# Patient Record
Sex: FEMALE | Race: OTHER | Employment: UNEMPLOYED | URBAN - METROPOLITAN AREA
[De-identification: names, ages, dates, MRNs, and addresses within clinical notes are randomized per-mention and may not be internally consistent; named-entity substitution may affect disease eponyms.]

---

## 2017-01-23 ENCOUNTER — TELEPHONE (OUTPATIENT)
Dept: FAMILY MEDICINE CLINIC | Facility: CLINIC | Age: 47
End: 2017-01-23

## 2017-01-23 DIAGNOSIS — R92.1 BREAST CALCIFICATION, LEFT: Primary | ICD-10-CM

## 2017-01-23 NOTE — TELEPHONE ENCOUNTER
Cate Leaks from imaging requesting diagnostic mammo order for f/u from last mammo, pt scheduled on Wednesday.

## 2017-01-25 ENCOUNTER — HOSPITAL ENCOUNTER (OUTPATIENT)
Dept: MAMMOGRAPHY | Facility: HOSPITAL | Age: 47
Discharge: HOME | End: 2017-01-25
Attending: FAMILY MEDICINE
Payer: COMMERCIAL

## 2017-01-25 DIAGNOSIS — R92.1 BREAST CALCIFICATION SEEN ON MAMMOGRAM: ICD-10-CM

## 2017-01-25 DIAGNOSIS — R92.1 BREAST CALCIFICATION, LEFT: ICD-10-CM

## 2017-01-25 PROCEDURE — 77065 DX MAMMO INCL CAD UNI: CPT | Performed by: RADIOLOGY

## 2017-01-29 NOTE — PROGRESS NOTES
Quick Note:    Follow up imaging shows stable calcifications and radiologist recommends follow imaging in 6 months. Please order. SIX MONTH FOLLOW-UP DIAGNOSTIC MAMMOGRAM: BILATERAL BREASTS.  Dx breast calcifications or abnormal mammogram  ______

## 2017-02-08 ENCOUNTER — TELEPHONE (OUTPATIENT)
Dept: FAMILY MEDICINE CLINIC | Facility: CLINIC | Age: 47
End: 2017-02-08

## 2017-02-08 DIAGNOSIS — R92.1 BREAST CALCIFICATIONS: Primary | ICD-10-CM

## 2017-02-08 NOTE — TELEPHONE ENCOUNTER
----- Message from Jodi Orozco MD sent at 1/29/2017  5:18 PM CST -----  Follow up imaging shows stable calcifications and radiologist recommends follow imaging in 6 months. Please order. SIX MONTH FOLLOW-UP DIAGNOSTIC MAMMOGRAM: BILATERAL BREASTS.   Dx

## 2017-02-17 NOTE — TELEPHONE ENCOUNTER
No return call from pt. Therefore results with MD note mailed to pt along with future 6 month f/u diagnostic mammo order.

## 2017-05-23 ENCOUNTER — APPOINTMENT (OUTPATIENT)
Dept: OCCUPATIONAL MEDICINE | Age: 47
End: 2017-05-23
Attending: EMERGENCY MEDICINE

## 2017-06-23 ENCOUNTER — APPOINTMENT (OUTPATIENT)
Dept: OTHER | Facility: HOSPITAL | Age: 47
End: 2017-06-23
Attending: EMERGENCY MEDICINE

## 2017-06-24 ENCOUNTER — APPOINTMENT (OUTPATIENT)
Dept: GENERAL RADIOLOGY | Facility: HOSPITAL | Age: 47
End: 2017-06-24
Attending: EMERGENCY MEDICINE
Payer: OTHER MISCELLANEOUS

## 2017-06-24 ENCOUNTER — HOSPITAL ENCOUNTER (EMERGENCY)
Facility: HOSPITAL | Age: 47
Discharge: HOME | End: 2017-06-24
Attending: EMERGENCY MEDICINE
Payer: OTHER MISCELLANEOUS

## 2017-06-24 VITALS
BODY MASS INDEX: 28.56 KG/M2 | SYSTOLIC BLOOD PRESSURE: 128 MMHG | WEIGHT: 182 LBS | HEART RATE: 60 BPM | TEMPERATURE: 98 F | RESPIRATION RATE: 18 BRPM | OXYGEN SATURATION: 99 % | DIASTOLIC BLOOD PRESSURE: 61 MMHG | HEIGHT: 67 IN

## 2017-06-24 DIAGNOSIS — S80.02XA CONTUSION OF LEFT KNEE, INITIAL ENCOUNTER: Primary | ICD-10-CM

## 2017-06-24 PROCEDURE — 99283 EMERGENCY DEPT VISIT LOW MDM: CPT

## 2017-06-24 PROCEDURE — 73560 X-RAY EXAM OF KNEE 1 OR 2: CPT | Performed by: EMERGENCY MEDICINE

## 2017-06-24 RX ORDER — IBUPROFEN 600 MG/1
600 TABLET ORAL ONCE
Status: COMPLETED | OUTPATIENT
Start: 2017-06-24 | End: 2017-06-24

## 2017-06-24 NOTE — ED INITIAL ASSESSMENT (HPI)
Pt states she was at work and 10 table tops fell on her back and hit the left side of her leg, c/o back pain and left knee pain.

## 2017-06-24 NOTE — ED NOTES
Pt resting on cart, per pt, she was stacking linen at work, 2 bundles of linen fell on pt, pushing her forward, pt fell on left knee, c/o left knee pain, which is now beginning to radiate up toward left thigh,   Toes, warm and mobile, cap refill is less th

## 2017-06-24 NOTE — ED PROVIDER NOTES
Patient Seen in: [facility] Emergency Department    History   Patient presents with:  Musculoskeletal Problem    Stated Complaint: left leg and back pain. The history is provided by the patient. The history is limited by a language barrier.  A and negative except as noted above. PSFH elements reviewed from today and agreed except as otherwise stated in HPI.     Physical Exam   ED Triage Vitals [06/24/17 0803]  BP: 158/68  Pulse: 64  Resp: 18  Temp: 97.8 °F (36.6 °C)  Temp src: Temporal  SpO2: to display    ============================================================  ED Course  ------------------------------------------------------------  MDM   Differential diagnosis includes fracture, dislocation, contusion, musculoskeletal strain, ligament in

## 2017-06-27 ENCOUNTER — OFFICE VISIT (OUTPATIENT)
Dept: FAMILY MEDICINE CLINIC | Facility: CLINIC | Age: 47
End: 2017-06-27

## 2017-06-27 VITALS
WEIGHT: 180.19 LBS | HEART RATE: 52 BPM | HEIGHT: 67 IN | BODY MASS INDEX: 28.28 KG/M2 | DIASTOLIC BLOOD PRESSURE: 65 MMHG | SYSTOLIC BLOOD PRESSURE: 115 MMHG

## 2017-06-27 DIAGNOSIS — L98.9 SKIN LESION: ICD-10-CM

## 2017-06-27 DIAGNOSIS — Z00.00 ROUTINE MEDICAL EXAM: Primary | ICD-10-CM

## 2017-06-27 PROCEDURE — 99396 PREV VISIT EST AGE 40-64: CPT | Performed by: FAMILY MEDICINE

## 2017-06-27 RX ORDER — MULTIVIT-MIN/IRON/FOLIC ACID/K 18-600-40
1 CAPSULE ORAL DAILY
COMMUNITY
End: 2017-06-27

## 2017-06-27 NOTE — PROGRESS NOTES
HPI:   Bernadette Chin is a 40year old female who presents for a complete physical exam.    Recent injury at work - dealing with workman's comp for It. Was doing well until the accident at work.  Concerned about lesion on her face and on left breast - feels lik lesion, numerous pigmented lesions on abdomen and upper back.    HEENT: atraumatic, normocephalic,ears and throat are clear  EYES:PERRLA, EOMI, normal optic disk,conjunctiva are clear  NECK: supple,no adenopathy,no bruits  CHEST: no chest tenderness  BREAST

## 2017-06-28 ENCOUNTER — LAB ENCOUNTER (OUTPATIENT)
Dept: LAB | Age: 47
End: 2017-06-28
Attending: FAMILY MEDICINE
Payer: COMMERCIAL

## 2017-06-28 DIAGNOSIS — Z00.00 ROUTINE MEDICAL EXAM: ICD-10-CM

## 2017-06-28 PROCEDURE — 36415 COLL VENOUS BLD VENIPUNCTURE: CPT

## 2017-06-28 PROCEDURE — 84443 ASSAY THYROID STIM HORMONE: CPT

## 2017-06-28 PROCEDURE — 80053 COMPREHEN METABOLIC PANEL: CPT

## 2017-06-28 PROCEDURE — 82306 VITAMIN D 25 HYDROXY: CPT

## 2017-06-28 PROCEDURE — 80061 LIPID PANEL: CPT

## 2017-06-28 PROCEDURE — 85025 COMPLETE CBC W/AUTO DIFF WBC: CPT

## 2017-07-05 ENCOUNTER — TELEPHONE (OUTPATIENT)
Dept: FAMILY MEDICINE CLINIC | Facility: CLINIC | Age: 47
End: 2017-07-05

## 2017-07-05 NOTE — PROGRESS NOTES
Tests are all normal except mildly decreased vitamin D levels - please take over the counter vitamin D 2000 units daily.

## 2017-07-05 NOTE — TELEPHONE ENCOUNTER
----- Message from Leroy Campbell MD sent at 7/5/2017  1:57 PM CDT -----  Tests are all normal except mildly decreased vitamin D levels - please take over the counter vitamin D 2000 units daily.

## 2017-08-08 ENCOUNTER — HOSPITAL ENCOUNTER (OUTPATIENT)
Dept: MAMMOGRAPHY | Facility: HOSPITAL | Age: 47
Discharge: HOME | End: 2017-08-08
Attending: FAMILY MEDICINE
Payer: COMMERCIAL

## 2017-08-08 DIAGNOSIS — R92.1 BREAST CALCIFICATIONS: ICD-10-CM

## 2017-08-08 PROCEDURE — 77066 DX MAMMO INCL CAD BI: CPT | Performed by: FAMILY MEDICINE

## 2017-08-10 ENCOUNTER — TELEPHONE (OUTPATIENT)
Dept: FAMILY MEDICINE CLINIC | Facility: CLINIC | Age: 47
End: 2017-08-10

## 2017-08-10 NOTE — TELEPHONE ENCOUNTER
----- Message from Dione Ramachandran MD sent at 8/9/2017  1:18 PM CDT -----  Normal mammogram. Plan for repeat in 1 year.

## 2017-08-31 ENCOUNTER — TELEPHONE (OUTPATIENT)
Dept: OTHER | Age: 47
End: 2017-08-31

## 2017-08-31 NOTE — TELEPHONE ENCOUNTER
Pt's daughter asking for Ranitidine refill, pt having pain in her stomach    Med listed as therapy compelted, please advise.

## 2017-09-01 ENCOUNTER — APPOINTMENT (OUTPATIENT)
Dept: LAB | Facility: HOSPITAL | Age: 47
End: 2017-09-01
Attending: EMERGENCY MEDICINE
Payer: COMMERCIAL

## 2017-09-01 ENCOUNTER — NURSE TRIAGE (OUTPATIENT)
Dept: OTHER | Age: 47
End: 2017-09-01

## 2017-09-01 ENCOUNTER — HOSPITAL ENCOUNTER (OUTPATIENT)
Age: 47
Discharge: HOME | End: 2017-09-01
Attending: EMERGENCY MEDICINE
Payer: COMMERCIAL

## 2017-09-01 VITALS
SYSTOLIC BLOOD PRESSURE: 138 MMHG | RESPIRATION RATE: 18 BRPM | HEART RATE: 54 BPM | OXYGEN SATURATION: 98 % | DIASTOLIC BLOOD PRESSURE: 79 MMHG | TEMPERATURE: 98 F

## 2017-09-01 DIAGNOSIS — R31.29 MICROSCOPIC HEMATURIA: ICD-10-CM

## 2017-09-01 DIAGNOSIS — R10.13 ABDOMINAL PAIN, EPIGASTRIC: ICD-10-CM

## 2017-09-01 DIAGNOSIS — R19.7 DIARRHEA, UNSPECIFIED TYPE: ICD-10-CM

## 2017-09-01 DIAGNOSIS — R19.7 DIARRHEA, UNSPECIFIED TYPE: Primary | ICD-10-CM

## 2017-09-01 LAB
URINE BILIRUBIN: NEGATIVE
URINE CLARITY: CLEAR
URINE COLOR: YELLOW
URINE GLUCOSE: NEGATIVE MG/DL
URINE KETONES: NEGATIVE MG/DL
URINE LEUKOCYTE ESTERASE: NEGATIVE
URINE NITRITE: NEGATIVE
URINE PH: 6
URINE PROTEIN: NEGATIVE MG /DL
URINE SPECIFIC GRAVITY: 1.02
URINE UROBILINOGEN: 1 MG/DL

## 2017-09-01 PROCEDURE — 81002 URINALYSIS NONAUTO W/O SCOPE: CPT

## 2017-09-01 PROCEDURE — 99213 OFFICE O/P EST LOW 20 MIN: CPT

## 2017-09-01 PROCEDURE — 87507 IADNA-DNA/RNA PROBE TQ 12-25: CPT

## 2017-09-01 RX ORDER — DICYCLOMINE HCL 20 MG
20 TABLET ORAL 4 TIMES DAILY PRN
Qty: 30 TABLET | Refills: 0 | Status: SHIPPED | OUTPATIENT
Start: 2017-09-01 | End: 2017-10-01

## 2017-09-01 RX ORDER — ONDANSETRON 4 MG/1
4 TABLET, ORALLY DISINTEGRATING ORAL EVERY 4 HOURS PRN
Qty: 10 TABLET | Refills: 0 | Status: SHIPPED | OUTPATIENT
Start: 2017-09-01 | End: 2017-09-08

## 2017-09-01 RX ORDER — FAMOTIDINE 20 MG/1
20 TABLET ORAL 2 TIMES DAILY
Qty: 14 TABLET | Refills: 0 | Status: SHIPPED | OUTPATIENT
Start: 2017-09-01 | End: 2017-09-08

## 2017-09-01 NOTE — ED NOTES
Patient given supplies to provide a stool specimen and instructions.   Gives verbal understanding of inst.

## 2017-09-01 NOTE — TELEPHONE ENCOUNTER
Reason for Disposition  • MODERATE diarrhea (e.g., 4-6 times / day more than normal) and age > 70 years    Protocols used: Elder Chapin

## 2017-09-01 NOTE — TELEPHONE ENCOUNTER
Action Requested: Summary for Provider     []  Critical Lab, Recommendations Needed  [] Need Additional Advice  []   FYI    []   Need Orders  [] Need Medications Sent to Pharmacy  []  Other     SUMMARY: pt reports abdominal pain, diarrhea, nausea x 2 days.

## 2017-09-01 NOTE — ED INITIAL ASSESSMENT (HPI)
Past couple of days has had some diarrhea after eating. Appetite decreasing  Color fare. No fevers  Nauseated but no vomiting.

## 2017-09-01 NOTE — ED PROVIDER NOTES
Patient Seen in: [facility] [place]    History   Patient presents with:  Nausea/Vomiting/Diarrhea (gastrointestinal)    Stated Complaint: abd pain/diarrhea    HPI    History is obtained with the help of a .     Patient Exam   ED Triage Vitals [09/01/17 1652]  BP: 138/79  Pulse: 54  Resp: 18  Temp: 98 °F (36.7 °C)  Temp src: Oral  SpO2: 98 %  O2 Device: None (Room air)    Current:/79   Pulse 54   Temp 98 °F (36.7 °C) (Oral)   Resp 18   SpO2 98%         Physical Exam epigastric    Disposition:  Discharge    Follow-up:  Grisel Roach, [address] [address] [phone]    Schedule an appointment as soon as possible for a visit in 2 days        Medications Prescribed:  Current Discha

## 2017-09-02 RX ORDER — RANITIDINE 150 MG/1
150 TABLET ORAL 2 TIMES DAILY
Qty: 60 TABLET | Refills: 0 | Status: SHIPPED | OUTPATIENT
Start: 2017-09-02 | End: 2017-10-17 | Stop reason: ALTCHOICE

## 2017-09-06 ENCOUNTER — OFFICE VISIT (OUTPATIENT)
Dept: DERMATOLOGY CLINIC | Facility: CLINIC | Age: 47
End: 2017-09-06

## 2017-09-06 DIAGNOSIS — D22.9 MULTIPLE NEVI: ICD-10-CM

## 2017-09-06 DIAGNOSIS — D23.5 BENIGN NEOPLASM OF SKIN OF TRUNK, EXCEPT SCROTUM: ICD-10-CM

## 2017-09-06 DIAGNOSIS — L82.1 SEBORRHEIC KERATOSES: ICD-10-CM

## 2017-09-06 DIAGNOSIS — D23.4 BENIGN NEOPLASM OF SCALP AND SKIN OF NECK: ICD-10-CM

## 2017-09-06 DIAGNOSIS — L82.0 INFLAMED SEBORRHEIC KERATOSIS: Primary | ICD-10-CM

## 2017-09-06 DIAGNOSIS — D23.30 BENIGN NEOPLASM OF SKIN OF FACE: ICD-10-CM

## 2017-09-06 DIAGNOSIS — D23.60 BENIGN NEOPLASM OF SKIN OF UPPER LIMB, INCLUDING SHOULDER, UNSPECIFIED LATERALITY: ICD-10-CM

## 2017-09-06 PROCEDURE — 99212 OFFICE O/P EST SF 10 MIN: CPT | Performed by: DERMATOLOGY

## 2017-09-06 PROCEDURE — 99203 OFFICE O/P NEW LOW 30 MIN: CPT | Performed by: DERMATOLOGY

## 2017-09-06 RX ORDER — MULTIVIT-MIN/IRON/FOLIC ACID/K 18-600-40
2000 CAPSULE ORAL DAILY
COMMUNITY
End: 2017-10-17 | Stop reason: ALTCHOICE

## 2017-09-17 NOTE — PROGRESS NOTES
Annalise Newsome is a 56year old female. HPI:     CC:  Patient presents with:  Lesion: \" New Patient\"- Pt presents today with lesion located on left breast x several years itching, pt noticed growth is getting larger within the last year no color change.  Pt Alcohol use No    Drug use: No    Sexual activity: Not on file     Other Topics Concern    Caffeine Concern Yes    Comment: pop some time    Exercise No    Pt has a pacemaker No    Pt has a defibrillator No    Reaction to local anesthetic No     Social His for additional history and physical exam also:    Assessment / plan:    No orders of the defined types were placed in this encounter.       Meds & Refills for this Visit:  No prescriptions requested or ordered in this encounter         Inflamed seborrheic k

## 2017-10-17 ENCOUNTER — OFFICE VISIT (OUTPATIENT)
Dept: FAMILY MEDICINE CLINIC | Facility: CLINIC | Age: 47
End: 2017-10-17

## 2017-10-17 VITALS
HEART RATE: 55 BPM | BODY MASS INDEX: 30 KG/M2 | SYSTOLIC BLOOD PRESSURE: 110 MMHG | WEIGHT: 188.38 LBS | DIASTOLIC BLOOD PRESSURE: 71 MMHG

## 2017-10-17 DIAGNOSIS — L98.9 SKIN LESION: ICD-10-CM

## 2017-10-17 DIAGNOSIS — R10.13 EPIGASTRIC ABDOMINAL PAIN: Primary | ICD-10-CM

## 2017-10-17 PROCEDURE — 90471 IMMUNIZATION ADMIN: CPT | Performed by: FAMILY MEDICINE

## 2017-10-17 PROCEDURE — 99213 OFFICE O/P EST LOW 20 MIN: CPT | Performed by: FAMILY MEDICINE

## 2017-10-17 PROCEDURE — 90686 IIV4 VACC NO PRSV 0.5 ML IM: CPT | Performed by: FAMILY MEDICINE

## 2017-10-17 PROCEDURE — 99212 OFFICE O/P EST SF 10 MIN: CPT | Performed by: FAMILY MEDICINE

## 2017-10-17 RX ORDER — OMEPRAZOLE 20 MG/1
20 CAPSULE, DELAYED RELEASE ORAL
Qty: 30 CAPSULE | Refills: 1 | Status: SHIPPED | OUTPATIENT
Start: 2017-10-17 | End: 2018-06-06 | Stop reason: ALTCHOICE

## 2017-10-17 NOTE — PROGRESS NOTES
Marlene Larios is a 56year old female. Patient presents with:  Abdominal Pain    HPI:   Reports for several months pain in epigastric area - upper abdominal - worse when  Eats spicy foods. Does not drink much coffee.  Went to ER for it and in beginning of Sept

## 2018-06-05 ENCOUNTER — HOSPITAL ENCOUNTER (EMERGENCY)
Facility: HOSPITAL | Age: 48
Discharge: HOME | End: 2018-06-05
Attending: PHYSICIAN ASSISTANT

## 2018-06-05 VITALS
BODY MASS INDEX: 29.82 KG/M2 | SYSTOLIC BLOOD PRESSURE: 128 MMHG | TEMPERATURE: 98 F | DIASTOLIC BLOOD PRESSURE: 83 MMHG | HEIGHT: 67 IN | WEIGHT: 190 LBS | RESPIRATION RATE: 18 BRPM | OXYGEN SATURATION: 100 % | HEART RATE: 61 BPM

## 2018-06-05 DIAGNOSIS — N63.20 LEFT BREAST MASS: Primary | ICD-10-CM

## 2018-06-05 DIAGNOSIS — N64.4 BREAST PAIN: ICD-10-CM

## 2018-06-05 PROCEDURE — 99282 EMERGENCY DEPT VISIT SF MDM: CPT

## 2018-06-05 RX ORDER — IBUPROFEN 600 MG/1
TABLET ORAL
Qty: 20 TABLET | Refills: 0 | Status: SHIPPED | OUTPATIENT
Start: 2018-06-05 | End: 2018-08-07

## 2018-06-05 NOTE — ED INITIAL ASSESSMENT (HPI)
Patient noticed a lump under the shin on her left breast a week and a half ago. States the lump feels large, and it \"hurts more when she is in the sun. \"

## 2018-06-06 ENCOUNTER — OFFICE VISIT (OUTPATIENT)
Dept: FAMILY MEDICINE CLINIC | Facility: CLINIC | Age: 48
End: 2018-06-06

## 2018-06-06 VITALS
WEIGHT: 192.38 LBS | SYSTOLIC BLOOD PRESSURE: 125 MMHG | DIASTOLIC BLOOD PRESSURE: 77 MMHG | HEART RATE: 55 BPM | BODY MASS INDEX: 30 KG/M2

## 2018-06-06 DIAGNOSIS — N63.0 BREAST LUMP: Primary | ICD-10-CM

## 2018-06-06 PROCEDURE — 99213 OFFICE O/P EST LOW 20 MIN: CPT | Performed by: FAMILY MEDICINE

## 2018-06-06 PROCEDURE — 99212 OFFICE O/P EST SF 10 MIN: CPT | Performed by: FAMILY MEDICINE

## 2018-06-06 RX ORDER — MULTIVIT-MIN/IRON/FOLIC ACID/K 18-600-40
1000 CAPSULE ORAL DAILY
COMMUNITY

## 2018-06-06 NOTE — CM/SW NOTE
Met with patient and daughter at bedside for f/u care regarding lump to breast.  Pt is currently uninsured and was informed and provided on [facility] info,  [address], and [facility] wellness clinics.   Patient and daughter verbalized understanding on se

## 2018-06-06 NOTE — ED PROVIDER NOTES
Patient Seen in: [facility] AND [facility] Emergency Department    History   Patient presents with:  Breast Problem (mammary)    Stated Complaint: lump on left brest     HPI    52-year-old female presents with chief complaint of left breast mass.   Onset 1 week a 128/83   Pulse 61   Temp 98.1 °F (36.7 °C) (Oral)   Resp 18   Ht 170.2 cm (5' 7\")   Wt 86.2 kg   SpO2 100%   BMI 29.76 kg/m²      PULSE OX within normal limits on room air as interpreted by this provider. Constitutional: The patient is cooperative.  Mary Ann Plan     Clinical Impression:  Left breast mass  (primary encounter diagnosis)  Breast pain    Disposition:  Discharge    Follow-up:  Florence Whitten, [address] [address] [phone]    Schedule an appointment as soon

## 2018-06-07 ENCOUNTER — HOSPITAL ENCOUNTER (OUTPATIENT)
Dept: MAMMOGRAPHY | Facility: HOSPITAL | Age: 48
Discharge: HOME | End: 2018-06-07
Attending: FAMILY MEDICINE

## 2018-06-07 ENCOUNTER — HOSPITAL ENCOUNTER (OUTPATIENT)
Dept: ULTRASOUND IMAGING | Facility: HOSPITAL | Age: 48
Discharge: HOME | End: 2018-06-07
Attending: FAMILY MEDICINE

## 2018-06-07 DIAGNOSIS — N63.0 BREAST LUMP: ICD-10-CM

## 2018-06-07 PROCEDURE — 77066 DX MAMMO INCL CAD BI: CPT | Performed by: FAMILY MEDICINE

## 2018-06-07 PROCEDURE — 76642 ULTRASOUND BREAST LIMITED: CPT | Performed by: FAMILY MEDICINE

## 2018-06-11 ENCOUNTER — TELEPHONE (OUTPATIENT)
Dept: FAMILY MEDICINE CLINIC | Facility: CLINIC | Age: 48
End: 2018-06-11

## 2018-06-11 NOTE — TELEPHONE ENCOUNTER
Pt would like a call back with her mammogram results. Per pt it was done on 6-7-18. Pt would like a call back in Panamanian.

## 2018-06-11 NOTE — TELEPHONE ENCOUNTER
Patient informed of results (identified name and ) and recommendations in English, as per LB result note copied below. Pt voices understanding and denies further questions at this time.       Result Notes     Notes recorded by Aniyah Rojas CMA on

## 2018-08-07 ENCOUNTER — OFFICE VISIT (OUTPATIENT)
Dept: FAMILY MEDICINE CLINIC | Facility: CLINIC | Age: 48
End: 2018-08-07

## 2018-08-07 VITALS
HEART RATE: 59 BPM | SYSTOLIC BLOOD PRESSURE: 128 MMHG | HEIGHT: 67 IN | BODY MASS INDEX: 29.98 KG/M2 | DIASTOLIC BLOOD PRESSURE: 83 MMHG | WEIGHT: 191 LBS

## 2018-08-07 DIAGNOSIS — M54.16 LUMBAR RADICULOPATHY: Primary | ICD-10-CM

## 2018-08-07 DIAGNOSIS — E55.9 VITAMIN D DEFICIENCY: ICD-10-CM

## 2018-08-07 DIAGNOSIS — Z01.818 PRE-OP EXAM: ICD-10-CM

## 2018-08-07 PROCEDURE — 99214 OFFICE O/P EST MOD 30 MIN: CPT | Performed by: NURSE PRACTITIONER

## 2018-08-07 NOTE — PROGRESS NOTES
HPI  Pt presents for pre-op physical for left L5S1 lumbar discectomy by Dr Jacky Lay.  Pt takes vitamin D and muscle relaxant-uncertain which one. Has chronic low back pain that radiates down left leg.    Has had prior surgery w/o anes Exercise No    Pt has a pacemaker No    Pt has a defibrillator No    Reaction to local anesthetic No     Social History Narrative    The patient does not use an assistive device. .      The patient does not live in a home with stairs.          Current Out WITH DIFFERENTIAL WITH PLATELET    COMP METABOLIC PANEL (14)    EKG 12-LEAD    PROTHROMBIN TIME (PT)    PTT, ACTIVATED    Pre-op exam    Relevant Orders    CBC WITH DIFFERENTIAL WITH PLATELET    COMP METABOLIC PANEL (14)    EKG 12-LEAD    PROTHROMBIN TIME

## 2018-08-08 ENCOUNTER — LAB ENCOUNTER (OUTPATIENT)
Dept: LAB | Age: 48
End: 2018-08-08
Attending: NURSE PRACTITIONER

## 2018-08-08 ENCOUNTER — APPOINTMENT (OUTPATIENT)
Dept: LAB | Age: 48
End: 2018-08-08
Attending: NURSE PRACTITIONER

## 2018-08-08 DIAGNOSIS — Z01.818 PRE-OP EXAM: ICD-10-CM

## 2018-08-08 DIAGNOSIS — M54.16 LUMBAR RADICULOPATHY: ICD-10-CM

## 2018-08-08 LAB
ALBUMIN SERPL BCP-MCNC: 3.7 G/DL (ref 3.5–4.8)
ALBUMIN/GLOB SERPL: 1.3 {RATIO} (ref 1–2)
ALP SERPL-CCNC: 58 U/L (ref 32–100)
ALT SERPL-CCNC: 25 U/L (ref 14–54)
ANION GAP SERPL CALC-SCNC: 8 MMOL/L (ref 0–18)
APTT PPP: 29.1 SECONDS (ref 23.2–35.3)
AST SERPL-CCNC: 22 U/L (ref 15–41)
BASOPHILS # BLD: 0 K/UL (ref 0–0.2)
BASOPHILS NFR BLD: 1 %
BILIRUB SERPL-MCNC: 0.5 MG/DL (ref 0.3–1.2)
BUN SERPL-MCNC: 5 MG/DL (ref 8–20)
BUN/CREAT SERPL: 10 (ref 10–20)
CALCIUM SERPL-MCNC: 8.6 MG/DL (ref 8.5–10.5)
CHLORIDE SERPL-SCNC: 103 MMOL/L (ref 95–110)
CO2 SERPL-SCNC: 26 MMOL/L (ref 22–32)
CREAT SERPL-MCNC: 0.5 MG/DL (ref 0.5–1.5)
EOSINOPHIL # BLD: 0.2 K/UL (ref 0–0.7)
EOSINOPHIL NFR BLD: 3 %
ERYTHROCYTE [DISTWIDTH] IN BLOOD BY AUTOMATED COUNT: 14.1 % (ref 11–15)
GLOBULIN PLAS-MCNC: 2.9 G/DL (ref 2.5–3.7)
GLUCOSE SERPL-MCNC: 92 MG/DL (ref 70–99)
HCT VFR BLD AUTO: 40.1 % (ref 35–48)
HGB BLD-MCNC: 13.6 G/DL (ref 12–16)
INR BLD: 0.9 (ref 0.9–1.2)
LYMPHOCYTES # BLD: 1.7 K/UL (ref 1–4)
LYMPHOCYTES NFR BLD: 26 %
MCH RBC QN AUTO: 31.8 PG (ref 27–32)
MCHC RBC AUTO-ENTMCNC: 33.9 G/DL (ref 32–37)
MCV RBC AUTO: 94 FL (ref 80–100)
MONOCYTES # BLD: 0.4 K/UL (ref 0–1)
MONOCYTES NFR BLD: 6 %
NEUTROPHILS # BLD AUTO: 4.2 K/UL (ref 1.8–7.7)
NEUTROPHILS NFR BLD: 65 %
OSMOLALITY UR CALC.SUM OF ELEC: 281 MOSM/KG (ref 275–295)
PATIENT FASTING: YES
PLATELET # BLD AUTO: 280 K/UL (ref 140–400)
PMV BLD AUTO: 8.5 FL (ref 7.4–10.3)
POTASSIUM SERPL-SCNC: 3.8 MMOL/L (ref 3.3–5.1)
PROT SERPL-MCNC: 6.6 G/DL (ref 5.9–8.4)
PROTHROMBIN TIME: 12 SECONDS (ref 11.8–14.5)
RBC # BLD AUTO: 4.26 M/UL (ref 3.7–5.4)
SODIUM SERPL-SCNC: 137 MMOL/L (ref 136–144)
WBC # BLD AUTO: 6.5 K/UL (ref 4–11)

## 2018-08-08 PROCEDURE — 85730 THROMBOPLASTIN TIME PARTIAL: CPT

## 2018-08-08 PROCEDURE — 80053 COMPREHEN METABOLIC PANEL: CPT

## 2018-08-08 PROCEDURE — 93005 ELECTROCARDIOGRAM TRACING: CPT

## 2018-08-08 PROCEDURE — 36415 COLL VENOUS BLD VENIPUNCTURE: CPT

## 2018-08-08 PROCEDURE — 85025 COMPLETE CBC W/AUTO DIFF WBC: CPT

## 2018-08-08 PROCEDURE — 93010 ELECTROCARDIOGRAM REPORT: CPT | Performed by: NURSE PRACTITIONER

## 2018-08-08 PROCEDURE — 85610 PROTHROMBIN TIME: CPT

## 2018-08-21 ENCOUNTER — SURGERY (OUTPATIENT)
Age: 48
End: 2018-08-21

## 2018-08-21 ENCOUNTER — APPOINTMENT (OUTPATIENT)
Dept: GENERAL RADIOLOGY | Facility: HOSPITAL | Age: 48
End: 2018-08-21
Attending: NEUROLOGICAL SURGERY
Payer: OTHER MISCELLANEOUS

## 2018-08-21 ENCOUNTER — HOSPITAL ENCOUNTER (OUTPATIENT)
Facility: HOSPITAL | Age: 48
Setting detail: HOSPITAL OUTPATIENT SURGERY
Discharge: HOME | End: 2018-08-21
Attending: NEUROLOGICAL SURGERY | Admitting: NEUROLOGICAL SURGERY
Payer: OTHER MISCELLANEOUS

## 2018-08-21 ENCOUNTER — ANESTHESIA EVENT (OUTPATIENT)
Dept: SURGERY | Facility: HOSPITAL | Age: 48
End: 2018-08-21
Payer: OTHER MISCELLANEOUS

## 2018-08-21 ENCOUNTER — ANESTHESIA (OUTPATIENT)
Dept: SURGERY | Facility: HOSPITAL | Age: 48
End: 2018-08-21
Payer: OTHER MISCELLANEOUS

## 2018-08-21 VITALS
SYSTOLIC BLOOD PRESSURE: 142 MMHG | RESPIRATION RATE: 16 BRPM | HEIGHT: 67 IN | HEART RATE: 57 BPM | BODY MASS INDEX: 29.82 KG/M2 | DIASTOLIC BLOOD PRESSURE: 62 MMHG | TEMPERATURE: 98 F | OXYGEN SATURATION: 98 % | WEIGHT: 190 LBS

## 2018-08-21 PROCEDURE — 01NB0ZZ RELEASE LUMBAR NERVE, OPEN APPROACH: ICD-10-PCS | Performed by: NEUROLOGICAL SURGERY

## 2018-08-21 PROCEDURE — 76001 XR C-ARM FLUORO >1 HOUR  (CPT=76001): CPT | Performed by: NEUROLOGICAL SURGERY

## 2018-08-21 PROCEDURE — 72020 X-RAY EXAM OF SPINE 1 VIEW: CPT | Performed by: NEUROLOGICAL SURGERY

## 2018-08-21 PROCEDURE — 0SB20ZZ EXCISION OF LUMBAR VERTEBRAL DISC, OPEN APPROACH: ICD-10-PCS | Performed by: NEUROLOGICAL SURGERY

## 2018-08-21 RX ORDER — ROCURONIUM BROMIDE 10 MG/ML
INJECTION, SOLUTION INTRAVENOUS AS NEEDED
Status: DISCONTINUED | OUTPATIENT
Start: 2018-08-21 | End: 2018-08-21 | Stop reason: SURG

## 2018-08-21 RX ORDER — CEFAZOLIN SODIUM/WATER 2 G/20 ML
2 SYRINGE (ML) INTRAVENOUS ONCE
Status: COMPLETED | OUTPATIENT
Start: 2018-08-21 | End: 2018-08-21

## 2018-08-21 RX ORDER — ONDANSETRON 2 MG/ML
INJECTION INTRAMUSCULAR; INTRAVENOUS AS NEEDED
Status: DISCONTINUED | OUTPATIENT
Start: 2018-08-21 | End: 2018-08-21 | Stop reason: SURG

## 2018-08-21 RX ORDER — DEXAMETHASONE SODIUM PHOSPHATE 4 MG/ML
VIAL (ML) INJECTION AS NEEDED
Status: DISCONTINUED | OUTPATIENT
Start: 2018-08-21 | End: 2018-08-21 | Stop reason: SURG

## 2018-08-21 RX ORDER — NALOXONE HYDROCHLORIDE 0.4 MG/ML
80 INJECTION, SOLUTION INTRAMUSCULAR; INTRAVENOUS; SUBCUTANEOUS AS NEEDED
Status: DISCONTINUED | OUTPATIENT
Start: 2018-08-21 | End: 2018-08-21

## 2018-08-21 RX ORDER — NEOSTIGMINE METHYLSULFATE 0.5 MG/ML
INJECTION INTRAVENOUS AS NEEDED
Status: DISCONTINUED | OUTPATIENT
Start: 2018-08-21 | End: 2018-08-21 | Stop reason: SURG

## 2018-08-21 RX ORDER — MORPHINE SULFATE 10 MG/ML
6 INJECTION, SOLUTION INTRAMUSCULAR; INTRAVENOUS EVERY 10 MIN PRN
Status: DISCONTINUED | OUTPATIENT
Start: 2018-08-21 | End: 2018-08-21

## 2018-08-21 RX ORDER — GLYCOPYRROLATE 0.2 MG/ML
INJECTION INTRAMUSCULAR; INTRAVENOUS AS NEEDED
Status: DISCONTINUED | OUTPATIENT
Start: 2018-08-21 | End: 2018-08-21 | Stop reason: SURG

## 2018-08-21 RX ORDER — METOCLOPRAMIDE 10 MG/1
10 TABLET ORAL ONCE
Status: DISCONTINUED | OUTPATIENT
Start: 2018-08-21 | End: 2018-08-21 | Stop reason: HOSPADM

## 2018-08-21 RX ORDER — HYDROMORPHONE HYDROCHLORIDE 1 MG/ML
INJECTION, SOLUTION INTRAMUSCULAR; INTRAVENOUS; SUBCUTANEOUS AS NEEDED
Status: DISCONTINUED | OUTPATIENT
Start: 2018-08-21 | End: 2018-08-21 | Stop reason: SURG

## 2018-08-21 RX ORDER — MORPHINE SULFATE 4 MG/ML
2 INJECTION, SOLUTION INTRAMUSCULAR; INTRAVENOUS EVERY 10 MIN PRN
Status: DISCONTINUED | OUTPATIENT
Start: 2018-08-21 | End: 2018-08-21

## 2018-08-21 RX ORDER — MORPHINE SULFATE 4 MG/ML
4 INJECTION, SOLUTION INTRAMUSCULAR; INTRAVENOUS EVERY 10 MIN PRN
Status: DISCONTINUED | OUTPATIENT
Start: 2018-08-21 | End: 2018-08-21

## 2018-08-21 RX ORDER — MIDAZOLAM HYDROCHLORIDE 1 MG/ML
INJECTION INTRAMUSCULAR; INTRAVENOUS AS NEEDED
Status: DISCONTINUED | OUTPATIENT
Start: 2018-08-21 | End: 2018-08-21 | Stop reason: SURG

## 2018-08-21 RX ORDER — HYDROCODONE BITARTRATE AND ACETAMINOPHEN 5; 325 MG/1; MG/1
1 TABLET ORAL AS NEEDED
Status: DISCONTINUED | OUTPATIENT
Start: 2018-08-21 | End: 2018-08-21

## 2018-08-21 RX ORDER — FAMOTIDINE 20 MG/1
20 TABLET ORAL ONCE
Status: DISCONTINUED | OUTPATIENT
Start: 2018-08-21 | End: 2018-08-21 | Stop reason: HOSPADM

## 2018-08-21 RX ORDER — SODIUM CHLORIDE, SODIUM LACTATE, POTASSIUM CHLORIDE, CALCIUM CHLORIDE 600; 310; 30; 20 MG/100ML; MG/100ML; MG/100ML; MG/100ML
INJECTION, SOLUTION INTRAVENOUS CONTINUOUS
Status: DISCONTINUED | OUTPATIENT
Start: 2018-08-21 | End: 2018-08-21

## 2018-08-21 RX ORDER — HYDROCODONE BITARTRATE AND ACETAMINOPHEN 5; 325 MG/1; MG/1
2 TABLET ORAL AS NEEDED
Status: DISCONTINUED | OUTPATIENT
Start: 2018-08-21 | End: 2018-08-21

## 2018-08-21 RX ORDER — ONDANSETRON 2 MG/ML
4 INJECTION INTRAMUSCULAR; INTRAVENOUS ONCE AS NEEDED
Status: DISCONTINUED | OUTPATIENT
Start: 2018-08-21 | End: 2018-08-21

## 2018-08-21 RX ORDER — ACETAMINOPHEN 500 MG
1000 TABLET ORAL ONCE
Status: COMPLETED | OUTPATIENT
Start: 2018-08-21 | End: 2018-08-21

## 2018-08-21 RX ORDER — LIDOCAINE HYDROCHLORIDE 10 MG/ML
INJECTION, SOLUTION EPIDURAL; INFILTRATION; INTRACAUDAL; PERINEURAL AS NEEDED
Status: DISCONTINUED | OUTPATIENT
Start: 2018-08-21 | End: 2018-08-21 | Stop reason: SURG

## 2018-08-21 RX ADMIN — DEXAMETHASONE SODIUM PHOSPHATE 4 MG: 4 MG/ML VIAL (ML) INJECTION at 12:35:00

## 2018-08-21 RX ADMIN — ONDANSETRON 4 MG: 2 INJECTION INTRAMUSCULAR; INTRAVENOUS at 13:27:00

## 2018-08-21 RX ADMIN — NEOSTIGMINE METHYLSULFATE 4.5 MG: 0.5 INJECTION INTRAVENOUS at 13:27:00

## 2018-08-21 RX ADMIN — HYDROMORPHONE HYDROCHLORIDE 0.2 MG: 1 INJECTION, SOLUTION INTRAMUSCULAR; INTRAVENOUS; SUBCUTANEOUS at 12:45:00

## 2018-08-21 RX ADMIN — SODIUM CHLORIDE, SODIUM LACTATE, POTASSIUM CHLORIDE, CALCIUM CHLORIDE: 600; 310; 30; 20 INJECTION, SOLUTION INTRAVENOUS at 12:25:00

## 2018-08-21 RX ADMIN — CEFAZOLIN SODIUM/WATER 2 G: 2 G/20 ML SYRINGE (ML) INTRAVENOUS at 12:34:00

## 2018-08-21 RX ADMIN — HYDROMORPHONE HYDROCHLORIDE 0.3 MG: 1 INJECTION, SOLUTION INTRAMUSCULAR; INTRAVENOUS; SUBCUTANEOUS at 13:28:00

## 2018-08-21 RX ADMIN — LIDOCAINE HYDROCHLORIDE 50 MG: 10 INJECTION, SOLUTION EPIDURAL; INFILTRATION; INTRACAUDAL; PERINEURAL at 12:30:00

## 2018-08-21 RX ADMIN — MIDAZOLAM HYDROCHLORIDE 2 MG: 1 INJECTION INTRAMUSCULAR; INTRAVENOUS at 12:25:00

## 2018-08-21 RX ADMIN — ROCURONIUM BROMIDE 40 MG: 10 INJECTION, SOLUTION INTRAVENOUS at 12:30:00

## 2018-08-21 RX ADMIN — GLYCOPYRROLATE 0.6 MG: 0.2 INJECTION INTRAMUSCULAR; INTRAVENOUS at 13:27:00

## 2018-08-21 NOTE — ANESTHESIA POSTPROCEDURE EVALUATION
Patient: Raphael Solo    Procedure Summary     Date:  08/21/18 Room / Location:  [address] [place] [address] MAIN OR 17 / [address] [place] [address] MAIN OR    Anesthesia Start:  9978 Anesthesia Stop:  5140    Procedure:  LUMBAR LAMINECTOMY 1 LEVEL (Left ) Diagnosis:  (Lumbar spondylolishtesis)    Nicole Cuba

## 2018-08-21 NOTE — ANESTHESIA PREPROCEDURE EVALUATION
Anesthesia PreOp Note    HPI:     Ki Dubon is a 56year old female who presents for preoperative consultation requested by: Annelise Lucas MD    Date of Surgery: 8/21/2018    Procedure(s):  LUMBAR LAMINECTOMY 1 LEVEL  Indication: Lumbar spondylolishtesis Intravenous Q10 Min PRN Val Rang, MD   morphINE sulfate (PF) 10 MG/ML injection 6 mg 6 mg Intravenous Q10 Min PRN Val Rang, MD   Atropine Sulfate 0.1 MG/ML injection 0.5 mg 0.5 mg Intravenous PRN Val Rang, MD   ondansetron HCl (Pooja Hare) injec Signs: Body mass index is 29.76 kg/m². height is 1.702 m (5' 7\") and weight is 86.2 kg (190 lb). Her oral temperature is 97.9 °F (36.6 °C). Her blood pressure is 143/64 and her pulse is 63. Her oxygen saturation is 98%.     08/10/18  1257 08/21/18  1124

## 2018-08-21 NOTE — OPERATIVE REPORT
NEUROSURGERY OPERATIVE NOTE    Surgery Date: 8/21/2018  Hospital: [facility]  Patient Name: López Maravilla  Patient MR#: [id number]  Surgeon: Dr. Charlie Lemons.  Darryl  Co-Surgeon: Saurav  Assistant: None    Anesthesia:  GETA  Length: 1 hours  Antibiotics: IV  Specimen: knees and heel of the feet. The head was placed in a neutral position on a foam ring. The lumbar spine was then prepped and draped in the usual sterile manner.     Surgical description: C-arm fluoroscopy was used for needle-localization of the L5S1 disc running vicryl. Dermabond was used as a final dressing. Attending statement: All needle, sponge, and instrument counts were correct at the time of closing and I was present for this entire procedure.

## 2018-08-21 NOTE — ANESTHESIA PROCEDURE NOTES
Airway  Date/Time: 8/21/2018 12:33 PM  Urgency: elective    Airway not difficult    General Information and Staff    Patient location during procedure: OR  Anesthesiologist: Emiliano Borja  Resident/CRNA: Alma Greenberg  Performed: CRNA     Indications a

## 2018-08-21 NOTE — INTERVAL H&P NOTE
Pre-op Diagnosis: Lumbar spondylolishtesis    The above referenced H&P was reviewed by Clyde Valentine MD on 8/21/2018, the patient was examined and no significant changes have occurred in the patient's condition since the H&P was performed.   I discussed with

## 2018-10-04 ENCOUNTER — OFFICE VISIT (OUTPATIENT)
Dept: FAMILY MEDICINE CLINIC | Facility: CLINIC | Age: 48
End: 2018-10-04

## 2018-10-04 VITALS
WEIGHT: 193 LBS | DIASTOLIC BLOOD PRESSURE: 70 MMHG | SYSTOLIC BLOOD PRESSURE: 122 MMHG | BODY MASS INDEX: 30 KG/M2 | HEART RATE: 54 BPM

## 2018-10-04 DIAGNOSIS — N64.4 BREAST PAIN: Primary | ICD-10-CM

## 2018-10-04 DIAGNOSIS — N60.02 CYST OF LEFT BREAST: ICD-10-CM

## 2018-10-04 PROCEDURE — 99213 OFFICE O/P EST LOW 20 MIN: CPT | Performed by: FAMILY MEDICINE

## 2018-10-04 PROCEDURE — 99212 OFFICE O/P EST SF 10 MIN: CPT | Performed by: FAMILY MEDICINE

## 2018-10-04 NOTE — PROGRESS NOTES
Raphael Solo is a 53year old female. Patient presents with:  Breast Pain: left breast     HPI:   Reports 4 months with left breast since cyst discovered. Pain with pressure and sleeping and even bumps in the road when driving.      Current Outpatient Medicat

## 2018-10-12 ENCOUNTER — OFFICE VISIT (OUTPATIENT)
Dept: SURGERY | Facility: CLINIC | Age: 48
End: 2018-10-12

## 2018-10-12 VITALS
DIASTOLIC BLOOD PRESSURE: 72 MMHG | BODY MASS INDEX: 29.98 KG/M2 | WEIGHT: 191 LBS | HEIGHT: 67 IN | HEART RATE: 58 BPM | SYSTOLIC BLOOD PRESSURE: 122 MMHG

## 2018-10-12 DIAGNOSIS — N64.4 BREAST PAIN, LEFT: ICD-10-CM

## 2018-10-12 DIAGNOSIS — N60.02 BREAST CYST, LEFT: Primary | ICD-10-CM

## 2018-10-12 PROCEDURE — 99212 OFFICE O/P EST SF 10 MIN: CPT | Performed by: SURGERY

## 2018-10-12 PROCEDURE — 19000 PUNCTURE ASPIR CYST BREAST: CPT | Performed by: SURGERY

## 2018-10-12 PROCEDURE — 99244 OFF/OP CNSLTJ NEW/EST MOD 40: CPT | Performed by: SURGERY

## 2018-10-12 PROCEDURE — 76942 ECHO GUIDE FOR BIOPSY: CPT | Performed by: SURGERY

## 2018-10-12 NOTE — H&P
History and Physical      Vinny Moeller is a 53year old female. HPI   Patient presents with:  Breast Lump: Pt. referred by Dr. Heidy Green for left breast cyst present for many years. Pt. states recently lump increased in size and is very painful.   Pt. had m Not on file    Other Topics      Concerns:         Service: Not Asked        Blood Transfusions: Not Asked        Caffeine Concern: Yes          pop some time        Occupational Exposure: Not Asked        Hobby Hazards: Not Asked        Sleep Conc masses  Extremities: no edema, cyanosis, or clubbing  Neurological: exam appropriate for age reflexes and motor skills appropriate for age   Breast: rel large tender swelling with sl redness lateral L breast, small dense tissue B, no LN or nipple d/c, exte

## 2018-12-29 NOTE — LETTER
AUTHORIZATION FOR SURGICAL OPERATION OR OTHER PROCEDURE    1. I hereby authorize Shae Bruner, and [facility] staff assigned to my case to perform the following operation and/or procedure at the [facility] site:    Cortisone injection in Right heel   _______________________________________________________________________________________________      _______________________________________________________________________________________________    2.  My physician has explained the nature and purpose of the operation or other procedure, possible alternative methods of treatment, the risks involved, and the possibility of complication to me.  I acknowledge that no guarantee has been made as to the result that may be obtained.  3.  I recognize that, during the course of this operation, or other procedure, unforseen conditions may necessitate additional or different procedure than those listed above.  I, therefore, further authorize and request that the above named physician, his/her physician assistants or designees perform such procedures as are, in his/her professional opinion, necessary and desirable.  4.  Any tissue or organs removed in the operation or other procedure may be disposed of by and at the discretion of the [facility] and [facility].  5.  I understand that in the event of a medical emergency, I will be transported by local paramedics to [facility] or other hospital emergency department.  6.  I certify that I have read and fully understand the above consent to operation and/or other procedure.    7.  I acknowledge that my physician has explained sedation/analgesia administration to me including the risks and benefits.  I consent to the administration of sedation/analgesia as may be necessary or desirable in the judgement of my physician.    Witness signature: ___________________________________________________ Date:   ______/______/_____                    Time:  ________ A.M.  P.M.       Patient Name:  ______________________________________________________  (please print)      Patient signature:  ___________________________________________________             Relationship to Patient:           []  Parent    Responsible person                          []  Spouse  In case of minor or                    [] Other  _____________   Incompetent name:  __________________________________________________                               (please print)      _____________      Responsible person  In case of minor or  Incompetent signature:  _______________________________________________    Statement of Physician  My signature below affirms that prior to the time of the procedure, I have explained to the patient and/or his/her guardian, the risks and benefits involved in the proposed treatment and any reasonable alternative to the proposed treatment.  I have also explained the risks and benefits involved in the refusal of the proposed treatment and have answered the patient's questions.                        Date:  ______/______/_______  Provider                      Signature:  __________________________________________________________       Time:  ___________ A.M    P.M.      Walk in

## 2019-02-07 ENCOUNTER — NURSE TRIAGE (OUTPATIENT)
Dept: OTHER | Age: 49
End: 2019-02-07

## 2019-02-07 NOTE — TELEPHONE ENCOUNTER
Action Requested: Summary for Provider     []  Critical Lab, Recommendations Needed  [] Need Additional Advice  []   FYI    []   Need Orders  [] Need Medications Sent to Pharmacy  []  Other     SUMMARY: Patient given appt. For Friday. Reason for call:  B

## 2019-02-13 ENCOUNTER — LAB ENCOUNTER (OUTPATIENT)
Dept: LAB | Age: 49
End: 2019-02-13
Attending: FAMILY MEDICINE

## 2019-02-13 ENCOUNTER — OFFICE VISIT (OUTPATIENT)
Dept: FAMILY MEDICINE CLINIC | Facility: CLINIC | Age: 49
End: 2019-02-13

## 2019-02-13 VITALS
BODY MASS INDEX: 30.13 KG/M2 | HEART RATE: 58 BPM | WEIGHT: 192 LBS | HEIGHT: 67 IN | DIASTOLIC BLOOD PRESSURE: 75 MMHG | SYSTOLIC BLOOD PRESSURE: 121 MMHG

## 2019-02-13 DIAGNOSIS — R79.89 ELEVATED PROLACTIN LEVEL: ICD-10-CM

## 2019-02-13 DIAGNOSIS — N64.4 BREAST PAIN, RIGHT: Primary | ICD-10-CM

## 2019-02-13 LAB — PROLACTIN SERPL-MCNC: 18.3 NG/ML

## 2019-02-13 PROCEDURE — 36415 COLL VENOUS BLD VENIPUNCTURE: CPT

## 2019-02-13 PROCEDURE — 99212 OFFICE O/P EST SF 10 MIN: CPT | Performed by: FAMILY MEDICINE

## 2019-02-13 PROCEDURE — 99213 OFFICE O/P EST LOW 20 MIN: CPT | Performed by: FAMILY MEDICINE

## 2019-02-13 PROCEDURE — 84146 ASSAY OF PROLACTIN: CPT

## 2019-02-13 NOTE — PROGRESS NOTES
Yo Roman is a 53year old female. Patient presents with:  Breast Pain    HPI:   Reports right breast pain. Had pain in left breast last year and cysts drained per Dr. Maddie Dumont.    Reports prior elevated prolactin years ago - had not repeated it in some time

## 2019-03-22 ENCOUNTER — HOSPITAL ENCOUNTER (OUTPATIENT)
Dept: ULTRASOUND IMAGING | Facility: HOSPITAL | Age: 49
Discharge: HOME | End: 2019-03-22
Attending: FAMILY MEDICINE

## 2019-03-22 ENCOUNTER — HOSPITAL ENCOUNTER (OUTPATIENT)
Dept: MAMMOGRAPHY | Facility: HOSPITAL | Age: 49
Discharge: HOME | End: 2019-03-22
Attending: FAMILY MEDICINE

## 2019-03-22 DIAGNOSIS — N64.4 BREAST PAIN, RIGHT: ICD-10-CM

## 2019-03-22 PROCEDURE — 77062 BREAST TOMOSYNTHESIS BI: CPT | Performed by: FAMILY MEDICINE

## 2019-03-22 PROCEDURE — 76642 ULTRASOUND BREAST LIMITED: CPT | Performed by: FAMILY MEDICINE

## 2019-03-22 PROCEDURE — 77066 DX MAMMO INCL CAD BI: CPT | Performed by: FAMILY MEDICINE

## 2019-03-27 RX ORDER — OMEPRAZOLE 20 MG/1
20 CAPSULE, DELAYED RELEASE ORAL
Qty: 30 CAPSULE | Refills: 2 | Status: SHIPPED | OUTPATIENT
Start: 2019-03-27 | End: 2019-04-02

## 2019-03-28 NOTE — TELEPHONE ENCOUNTER
Refill passed per [facility], [facility] protocol.   Refill Protocol Appointment Criteria  · Appointment scheduled in the past 12 months or in the next 3 months  Recent Outpatient Visits            1 month ago Breast pain, right    [facility], [facility], [address] [place]

## 2019-04-02 ENCOUNTER — OFFICE VISIT (OUTPATIENT)
Dept: FAMILY MEDICINE CLINIC | Facility: CLINIC | Age: 49
End: 2019-04-02

## 2019-04-02 VITALS
SYSTOLIC BLOOD PRESSURE: 123 MMHG | HEIGHT: 67 IN | HEART RATE: 58 BPM | TEMPERATURE: 98 F | WEIGHT: 196.81 LBS | DIASTOLIC BLOOD PRESSURE: 73 MMHG | RESPIRATION RATE: 12 BRPM | BODY MASS INDEX: 30.89 KG/M2

## 2019-04-02 DIAGNOSIS — N60.01 BREAST CYST, RIGHT: Primary | ICD-10-CM

## 2019-04-02 DIAGNOSIS — N64.4 BREAST PAIN: ICD-10-CM

## 2019-04-02 DIAGNOSIS — Z00.00 ROUTINE MEDICAL EXAM: ICD-10-CM

## 2019-04-02 PROBLEM — Z01.818 PRE-OP EXAM: Status: RESOLVED | Noted: 2018-08-07 | Resolved: 2019-04-02

## 2019-04-02 PROCEDURE — 99213 OFFICE O/P EST LOW 20 MIN: CPT | Performed by: FAMILY MEDICINE

## 2019-04-02 PROCEDURE — 99212 OFFICE O/P EST SF 10 MIN: CPT | Performed by: FAMILY MEDICINE

## 2019-04-02 RX ORDER — OMEPRAZOLE 20 MG/1
20 CAPSULE, DELAYED RELEASE ORAL
Qty: 30 CAPSULE | Refills: 2 | Status: SHIPPED | OUTPATIENT
Start: 2019-04-02 | End: 2019-10-08

## 2019-04-02 NOTE — PROGRESS NOTES
Haider Faye is a 53year old female. Patient presents with:  Test Results: mammogram    HPI:   Pt here for follow up on breast cyst. Reports pain in right breast. Had left breast cyst removed last year and improved pain.      Current Outpatient Medications o patient indicates understanding of these issues and agrees to the plan.       April Flores MD  4/2/2019  11:06 AM

## 2019-04-03 ENCOUNTER — LAB ENCOUNTER (OUTPATIENT)
Dept: LAB | Age: 49
End: 2019-04-03
Attending: FAMILY MEDICINE

## 2019-04-03 DIAGNOSIS — Z00.00 ROUTINE MEDICAL EXAM: ICD-10-CM

## 2019-04-03 PROCEDURE — 36415 COLL VENOUS BLD VENIPUNCTURE: CPT

## 2019-04-03 PROCEDURE — 84443 ASSAY THYROID STIM HORMONE: CPT

## 2019-04-03 PROCEDURE — 82607 VITAMIN B-12: CPT

## 2019-04-03 PROCEDURE — 85025 COMPLETE CBC W/AUTO DIFF WBC: CPT

## 2019-04-03 PROCEDURE — 80053 COMPREHEN METABOLIC PANEL: CPT

## 2019-04-03 PROCEDURE — 82306 VITAMIN D 25 HYDROXY: CPT

## 2019-04-03 PROCEDURE — 80061 LIPID PANEL: CPT

## 2019-04-10 ENCOUNTER — OFFICE VISIT (OUTPATIENT)
Dept: SURGERY | Facility: CLINIC | Age: 49
End: 2019-04-10

## 2019-04-10 VITALS — BODY MASS INDEX: 31 KG/M2 | WEIGHT: 196 LBS

## 2019-04-10 DIAGNOSIS — N60.01 CYST OF RIGHT BREAST: ICD-10-CM

## 2019-04-10 DIAGNOSIS — N64.4 MASTODYNIA OF RIGHT BREAST: Primary | ICD-10-CM

## 2019-04-10 PROCEDURE — 99212 OFFICE O/P EST SF 10 MIN: CPT | Performed by: SURGERY

## 2019-04-10 PROCEDURE — 99214 OFFICE O/P EST MOD 30 MIN: CPT | Performed by: SURGERY

## 2019-04-10 NOTE — PROGRESS NOTES
Labs are all normal except mildly decreased vitamin D levels - pt to take vitamin D 2000 units daily.  Cholesterol, glucose, kidney and liver function all normal.

## 2019-04-12 NOTE — PROGRESS NOTES
Established Patient Follow-up      4/11/2019    Marlene Larios 53year old      HPI  Patient presents with:  Breast Lump: Right breast painful, radiating to axilla. Patient began having pain since 11/2018, but the cyst was found via [address] 3/22/2019.   Denies any

## 2019-07-03 ENCOUNTER — HOSPITAL ENCOUNTER (OUTPATIENT)
Dept: ULTRASOUND IMAGING | Facility: HOSPITAL | Age: 49
Discharge: HOME | End: 2019-07-03
Attending: SURGERY

## 2019-07-03 VITALS — DIASTOLIC BLOOD PRESSURE: 67 MMHG | SYSTOLIC BLOOD PRESSURE: 131 MMHG | HEART RATE: 55 BPM

## 2019-07-03 DIAGNOSIS — N64.4 MASTODYNIA OF RIGHT BREAST: ICD-10-CM

## 2019-07-03 DIAGNOSIS — N60.01 CYST OF RIGHT BREAST: ICD-10-CM

## 2019-07-03 PROCEDURE — 76942 ECHO GUIDE FOR BIOPSY: CPT | Performed by: SURGERY

## 2019-07-03 PROCEDURE — 19000 PUNCTURE ASPIR CYST BREAST: CPT | Performed by: SURGERY

## 2019-07-03 NOTE — IMAGING NOTE
Cyst aspiration was completed per Nikki Joy us tech.  The  fluid was discarded per Dr Monroe Goes order  It was just a cyst aspiration \"nothing was sent down to lab \"

## 2019-09-18 ENCOUNTER — OFFICE VISIT (OUTPATIENT)
Dept: FAMILY MEDICINE CLINIC | Facility: CLINIC | Age: 49
End: 2019-09-18

## 2019-09-18 VITALS
DIASTOLIC BLOOD PRESSURE: 65 MMHG | BODY MASS INDEX: 30.1 KG/M2 | WEIGHT: 191.81 LBS | SYSTOLIC BLOOD PRESSURE: 126 MMHG | HEART RATE: 55 BPM | HEIGHT: 67 IN

## 2019-09-18 DIAGNOSIS — N76.0 ACUTE VAGINITIS: ICD-10-CM

## 2019-09-18 DIAGNOSIS — R30.0 DYSURIA: Primary | ICD-10-CM

## 2019-09-18 DIAGNOSIS — L98.9 SKIN LESION OF FACE: ICD-10-CM

## 2019-09-18 LAB
APPEARANCE: CLEAR
MULTISTIX LOT#: NORMAL NUMERIC
PH, URINE: 7 (ref 4.5–8)
SPECIFIC GRAVITY: 1.01 (ref 1–1.03)
UROBILINOGEN,SEMI-QN: 0.2 MG/DL (ref 0–1.9)

## 2019-09-18 PROCEDURE — 99214 OFFICE O/P EST MOD 30 MIN: CPT | Performed by: FAMILY MEDICINE

## 2019-09-18 PROCEDURE — 81002 URINALYSIS NONAUTO W/O SCOPE: CPT | Performed by: FAMILY MEDICINE

## 2019-09-18 RX ORDER — TRAMADOL HYDROCHLORIDE 50 MG/1
50 TABLET ORAL EVERY 6 HOURS PRN
Refills: 0 | COMMUNITY
Start: 2019-07-25 | End: 2022-01-10

## 2019-09-18 RX ORDER — FLUCONAZOLE 150 MG/1
150 TABLET ORAL ONCE
Qty: 2 TABLET | Refills: 1 | Status: SHIPPED | OUTPATIENT
Start: 2019-09-18 | End: 2019-09-18

## 2019-09-18 NOTE — PROGRESS NOTES
Elise Mckeon is a 51year old female. Patient presents with:  Burning On Urination  Itchiness: vaginal  skin lesion face  HPI:   Reports day with burning in the vagina but not with urination - has burning when wiping. Noted discharge yesterday and today.    A face  Referral   - ENT - INTERNAL        The patient indicates understanding of these issues and agrees to the plan.       Elise Peterson MD  9/18/2019  5:06 PM

## 2019-10-01 ENCOUNTER — OFFICE VISIT (OUTPATIENT)
Dept: OTOLARYNGOLOGY | Facility: CLINIC | Age: 49
End: 2019-10-01

## 2019-10-01 VITALS
TEMPERATURE: 97 F | HEIGHT: 67 IN | WEIGHT: 187 LBS | DIASTOLIC BLOOD PRESSURE: 76 MMHG | BODY MASS INDEX: 29.35 KG/M2 | SYSTOLIC BLOOD PRESSURE: 126 MMHG | HEART RATE: 55 BPM

## 2019-10-01 DIAGNOSIS — L98.9 LESION OF EYEBROW: Primary | ICD-10-CM

## 2019-10-01 PROCEDURE — 99244 OFF/OP CNSLTJ NEW/EST MOD 40: CPT | Performed by: OTOLARYNGOLOGY

## 2019-10-01 RX ORDER — FLUCONAZOLE 150 MG/1
TABLET ORAL
Refills: 1 | COMMUNITY
Start: 2019-09-18 | End: 2019-10-08

## 2019-10-01 NOTE — PROGRESS NOTES
Tiana Drew is a 51year old female.   Patient presents with:  Lesion: right side of eyebrow for several years, increased in size recently       [address] [place] [address]  She presents with a right-sided eyebrow lesion is been there for years but has increa CHOLECYSTECTOMY  2003   • LUMBAR LAMINECTOMY 1 LEVEL Left 8/21/2018    Performed by Ronda Diehl MD at [facility] OR   • TOTAL ABDOMINAL HYSTERECTOMY  2004    unilateral salpingoophorectomy, postop bleeding req surgical exploration         REVIEW OF SYSTEMS Elevated nontender no bleeding on the right side        Lymph Detail Normal Submental. Submandibular. Anterior cervical. Posterior cervical. Supraclavicular.         Nose/Mouth/Throat Normal External nose - Normal. Lips/teeth/gums - Normal. Tonsils - Normal

## 2019-10-08 VITALS — WEIGHT: 187 LBS | HEIGHT: 67 IN | BODY MASS INDEX: 29.35 KG/M2

## 2019-10-14 ENCOUNTER — HOSPITAL ENCOUNTER (OUTPATIENT)
Facility: HOSPITAL | Age: 49
Setting detail: HOSPITAL OUTPATIENT SURGERY
Discharge: HOME | End: 2019-10-14
Attending: OTOLARYNGOLOGY | Admitting: OTOLARYNGOLOGY

## 2019-10-14 ENCOUNTER — TELEPHONE (OUTPATIENT)
Dept: OTOLARYNGOLOGY | Facility: CLINIC | Age: 49
End: 2019-10-14

## 2019-10-14 DIAGNOSIS — L98.9 LESION OF EYEBROW: ICD-10-CM

## 2019-10-14 PROCEDURE — 0HB1XZZ EXCISION OF FACE SKIN, EXTERNAL APPROACH: ICD-10-PCS | Performed by: OTOLARYNGOLOGY

## 2019-10-14 PROCEDURE — 13131 CMPLX RPR F/C/C/M/N/AX/G/H/F: CPT | Performed by: OTOLARYNGOLOGY

## 2019-10-14 PROCEDURE — 11441 EXC FACE-MM B9+MARG 0.6-1 CM: CPT | Performed by: OTOLARYNGOLOGY

## 2019-10-14 RX ORDER — LIDOCAINE HYDROCHLORIDE AND EPINEPHRINE 10; 10 MG/ML; UG/ML
INJECTION, SOLUTION INFILTRATION; PERINEURAL AS NEEDED
Status: DISCONTINUED | OUTPATIENT
Start: 2019-10-14 | End: 2019-10-14 | Stop reason: HOSPADM

## 2019-10-14 RX ORDER — ONDANSETRON 2 MG/ML
4 INJECTION INTRAMUSCULAR; INTRAVENOUS EVERY 6 HOURS PRN
Status: CANCELLED | OUTPATIENT
Start: 2019-10-14

## 2019-10-14 RX ORDER — HYDROCODONE BITARTRATE AND ACETAMINOPHEN 5; 325 MG/1; MG/1
1 TABLET ORAL EVERY 8 HOURS PRN
Qty: 10 TABLET | Refills: 0 | Status: SHIPPED | OUTPATIENT
Start: 2019-10-14 | End: 2020-07-13

## 2019-10-14 RX ORDER — HYDROCODONE BITARTRATE AND ACETAMINOPHEN 5; 325 MG/1; MG/1
1 TABLET ORAL EVERY 4 HOURS PRN
Status: CANCELLED | OUTPATIENT
Start: 2019-10-14

## 2019-10-14 RX ORDER — HYDROCODONE BITARTRATE AND ACETAMINOPHEN 5; 325 MG/1; MG/1
2 TABLET ORAL EVERY 4 HOURS PRN
Status: CANCELLED | OUTPATIENT
Start: 2019-10-14

## 2019-10-14 RX ORDER — CEPHALEXIN 500 MG/1
500 CAPSULE ORAL EVERY 8 HOURS
Qty: 21 CAPSULE | Refills: 0 | Status: SHIPPED | OUTPATIENT
Start: 2019-10-14 | End: 2020-07-13

## 2019-10-14 RX ORDER — ACETAMINOPHEN 325 MG/1
650 TABLET ORAL EVERY 4 HOURS PRN
Status: CANCELLED | OUTPATIENT
Start: 2019-10-14

## 2019-10-14 NOTE — TELEPHONE ENCOUNTER
•  HYDROcodone-acetaminophen 5-325 MG Oral Tab, Take 1 tablet by mouth every 8 (eight) hours as needed for Pain., Disp: 10 tablet, Rfl: 0    Prior Auth received from Gustavo Hernandez does not cover this medication/  [phone]

## 2019-10-14 NOTE — INTERVAL H&P NOTE
Pre-op Diagnosis: Lesion of eyebrow [L98.9]    The above referenced H&P was reviewed by Antwon Galindo. Morteza Simon MD on 10/14/2019, the patient was examined and no significant changes have occurred in the patient's condition since the H&P was performed.   I discussed

## 2019-10-14 NOTE — H&P
HISTORY OF PRESENT ILLNESS  She presents with a right-sided eyebrow lesion is been there for years but has increased dramatically in the past year. She has concerns due to this quick growth over the past 12 months.   No drainage no pain but she does state • TOTAL ABDOMINAL HYSTERECTOMY   2004     unilateral salpingoophorectomy, postop bleeding req surgical exploration            REVIEW OF SYSTEMS     System Neg/Pos Details   Constitutional Negative Fatigue, fever and weight loss. ENMT Negative Drooling. Submandibular.  Anterior cervical. Posterior cervical. Supraclavicular.           Nose/Mouth/Throat Normal External nose - Normal. Lips/teeth/gums - Normal. Tonsils - Normal. Oropharynx - Normal.   Nose/Mouth/Throat Normal Nares - Right: Normal Left: Normal

## 2019-10-14 NOTE — TELEPHONE ENCOUNTER
Pt does not have insurance. Pt will call pharmacy to see how much medication will cost.Per pharmacy she only has workmans comp on file and that would not apply.

## 2019-10-14 NOTE — BRIEF OP NOTE
Pre-Operative Diagnosis: Lesion of eyebrow [L98.9]     Post-Operative Diagnosis: Lesion of eyebrow [L98.9]      Procedure Performed:   Procedure(s):  excision and reconstruction of right eyebrow lesion    Surgeon(s) and Role:     MD Kraig Reynolds

## 2019-10-14 NOTE — OPERATIVE REPORT
[facility]    PATIENT'S NAME: VIRIDIANA MANZANO   ATTENDING PHYSICIAN: Nicholas Jones MD   OPERATING PHYSICIAN: Lexa Breen.  Cary Jones MD   PATIENT ACCOUNT#:   [id number]    LOCATION:  [address]  MEDICAL RECORD #:   [id number]       DATE OF BIRTH:

## 2019-10-15 ENCOUNTER — TELEPHONE (OUTPATIENT)
Dept: OTOLARYNGOLOGY | Facility: CLINIC | Age: 49
End: 2019-10-15

## 2019-10-15 NOTE — TELEPHONE ENCOUNTER
Per pt doing well, incision is dry and intact. Pt taking antibiotic as prescribed, per pt no pain but pt does have some swelling at incision site, no redness or drainage.  Advised pt to monitor, contact our office is symptoms worsen such as drainage, bleedi

## 2019-10-15 NOTE — TELEPHONE ENCOUNTER
Post op day 1 excision and reconstruction of right eyebrow lesion,LMTCB with the used of language line.

## 2019-10-22 ENCOUNTER — OFFICE VISIT (OUTPATIENT)
Dept: OTOLARYNGOLOGY | Facility: CLINIC | Age: 49
End: 2019-10-22

## 2019-10-22 VITALS
HEIGHT: 67 IN | WEIGHT: 187 LBS | SYSTOLIC BLOOD PRESSURE: 124 MMHG | BODY MASS INDEX: 29.35 KG/M2 | DIASTOLIC BLOOD PRESSURE: 72 MMHG | TEMPERATURE: 98 F

## 2019-10-22 DIAGNOSIS — L98.9 LESION OF EYEBROW: Primary | ICD-10-CM

## 2019-10-22 PROCEDURE — 99024 POSTOP FOLLOW-UP VISIT: CPT | Performed by: OTOLARYNGOLOGY

## 2019-10-22 NOTE — PROGRESS NOTES
Carlos Peoples is a 51year old female.   Patient presents with:  Post-Op: Excision and reconstruction of right eyebrow lesion done on 10/14/19; pt is doing well      HISTORY OF PRESENT ILLNESS  She presents with a right-sided eyebrow lesion is been there for y Procedure Laterality Date   • CHOLECYSTECTOMY     • EXC SKIN BENIG 2.1-3CM FACE,FACIAL Right 10/14/2019   • EXCISION LESION HEAD/NECK/FACE Right 10/14/2019    Performed by Chantal Ribeiro MD at [address] [place] [address] OR   • HC PUNCTURE ASPIRATION BREAST CYST Left 10/1 through XII grossly intact.    Head/Face Normal Facial features - Normal. Eyebrows - Normal. Skull - Normal.        Nasopharynx Normal External nose - Normal. Lips/teeth/gums - Normal. Tonsils - Normal. Oropharynx - Normal.   Ears Normal Inspection - Right:

## 2020-01-08 ENCOUNTER — TELEPHONE (OUTPATIENT)
Dept: FAMILY MEDICINE CLINIC | Facility: CLINIC | Age: 50
End: 2020-01-08

## 2020-01-08 RX ORDER — OMEPRAZOLE 20 MG/1
20 CAPSULE, DELAYED RELEASE ORAL
Qty: 30 CAPSULE | Refills: 2 | Status: SHIPPED | OUTPATIENT
Start: 2020-01-08 | End: 2021-09-20

## 2020-01-08 NOTE — TELEPHONE ENCOUNTER
OMEPRAZOLE 20mg capsules Qty. 30  Take 1 capsule (20mg) by mouth every morning before Breakfast.    Medication is crossed of on Medication Hx. Pt has take medication before.

## 2020-01-08 NOTE — TELEPHONE ENCOUNTER
Pt requesting rx , advised it was discontinued on current medlist- stated she takes as need when she have pain epigastric area     LOV 9/18/19

## 2020-01-09 NOTE — TELEPHONE ENCOUNTER
Patient does not have any insurance on file therefore a PA can not be done. Contacted pharmacy to relay information. Pharmacy to contact patient regarding prescription .

## 2020-01-09 NOTE — TELEPHONE ENCOUNTER
Pharmacy requesting PA for   •  omeprazole 20 MG Oral Capsule Delayed Release, Take 1 capsule (20 mg total) by mouth every morning before breakfast., Disp: 30 capsule, Rfl: 2

## 2020-03-18 ENCOUNTER — LAB ENCOUNTER (OUTPATIENT)
Dept: LAB | Facility: HOSPITAL | Age: 50
End: 2020-03-18
Attending: ORTHOPAEDIC SURGERY
Payer: OTHER MISCELLANEOUS

## 2020-03-18 ENCOUNTER — HOSPITAL ENCOUNTER (OUTPATIENT)
Dept: GENERAL RADIOLOGY | Facility: HOSPITAL | Age: 50
Discharge: HOME | End: 2020-03-18
Attending: ORTHOPAEDIC SURGERY
Payer: OTHER MISCELLANEOUS

## 2020-03-18 DIAGNOSIS — Z01.818 PRE-OP TESTING: ICD-10-CM

## 2020-03-18 DIAGNOSIS — Z01.818 PREOP EXAMINATION: Primary | ICD-10-CM

## 2020-03-18 LAB
ANION GAP SERPL CALC-SCNC: 4 MMOL/L (ref 0–18)
APTT PPP: 28.1 SECONDS (ref 23.2–35.3)
BASOPHILS # BLD AUTO: 0.04 X10(3) UL (ref 0–0.2)
BASOPHILS NFR BLD AUTO: 0.6 %
BUN BLD-MCNC: 8 MG/DL (ref 7–18)
BUN/CREAT SERPL: 15.1 (ref 10–20)
CALCIUM BLD-MCNC: 8.7 MG/DL (ref 8.5–10.1)
CHLORIDE SERPL-SCNC: 108 MMOL/L (ref 98–112)
CO2 SERPL-SCNC: 27 MMOL/L (ref 21–32)
CREAT BLD-MCNC: 0.53 MG/DL (ref 0.55–1.02)
DEPRECATED RDW RBC AUTO: 48.9 FL (ref 35.1–46.3)
EOSINOPHIL # BLD AUTO: 0.13 X10(3) UL (ref 0–0.7)
EOSINOPHIL NFR BLD AUTO: 2.1 %
ERYTHROCYTE [DISTWIDTH] IN BLOOD BY AUTOMATED COUNT: 13.7 % (ref 11–15)
GLUCOSE BLD-MCNC: 86 MG/DL (ref 70–99)
HCT VFR BLD AUTO: 44.2 % (ref 35–48)
HGB BLD-MCNC: 14.6 G/DL (ref 12–16)
IMM GRANULOCYTES # BLD AUTO: 0.02 X10(3) UL (ref 0–1)
IMM GRANULOCYTES NFR BLD: 0.3 %
INR BLD: 0.97 (ref 0.9–1.2)
LYMPHOCYTES # BLD AUTO: 1.97 X10(3) UL (ref 1–4)
LYMPHOCYTES NFR BLD AUTO: 31.7 %
MCH RBC QN AUTO: 31.9 PG (ref 26–34)
MCHC RBC AUTO-ENTMCNC: 33 G/DL (ref 31–37)
MCV RBC AUTO: 96.5 FL (ref 80–100)
MONOCYTES # BLD AUTO: 0.37 X10(3) UL (ref 0.1–1)
MONOCYTES NFR BLD AUTO: 5.9 %
NEUTROPHILS # BLD AUTO: 3.69 X10 (3) UL (ref 1.5–7.7)
NEUTROPHILS # BLD AUTO: 3.69 X10(3) UL (ref 1.5–7.7)
NEUTROPHILS NFR BLD AUTO: 59.4 %
OSMOLALITY SERPL CALC.SUM OF ELEC: 286 MOSM/KG (ref 275–295)
PATIENT FASTING Y/N/NP: YES
PLATELET # BLD AUTO: 255 10(3)UL (ref 150–450)
POTASSIUM SERPL-SCNC: 3.7 MMOL/L (ref 3.5–5.1)
PROTHROMBIN TIME: 12.7 SECONDS (ref 11.8–14.5)
RBC # BLD AUTO: 4.58 X10(6)UL (ref 3.8–5.3)
SODIUM SERPL-SCNC: 139 MMOL/L (ref 136–145)
WBC # BLD AUTO: 6.2 X10(3) UL (ref 4–11)

## 2020-03-18 PROCEDURE — 85610 PROTHROMBIN TIME: CPT

## 2020-03-18 PROCEDURE — 80048 BASIC METABOLIC PNL TOTAL CA: CPT

## 2020-03-18 PROCEDURE — 85025 COMPLETE CBC W/AUTO DIFF WBC: CPT

## 2020-03-18 PROCEDURE — 36415 COLL VENOUS BLD VENIPUNCTURE: CPT

## 2020-03-18 PROCEDURE — 85730 THROMBOPLASTIN TIME PARTIAL: CPT

## 2020-03-18 PROCEDURE — 93010 ELECTROCARDIOGRAM REPORT: CPT | Performed by: ORTHOPAEDIC SURGERY

## 2020-03-18 PROCEDURE — 93005 ELECTROCARDIOGRAM TRACING: CPT

## 2020-03-18 PROCEDURE — 71046 X-RAY EXAM CHEST 2 VIEWS: CPT | Performed by: ORTHOPAEDIC SURGERY

## 2020-05-07 ENCOUNTER — TELEMEDICINE (OUTPATIENT)
Dept: FAMILY MEDICINE CLINIC | Facility: CLINIC | Age: 50
End: 2020-05-07

## 2020-05-07 ENCOUNTER — NURSE TRIAGE (OUTPATIENT)
Dept: FAMILY MEDICINE CLINIC | Facility: CLINIC | Age: 50
End: 2020-05-07

## 2020-05-07 DIAGNOSIS — J30.2 SEASONAL ALLERGIES: Primary | ICD-10-CM

## 2020-05-07 PROCEDURE — 99213 OFFICE O/P EST LOW 20 MIN: CPT | Performed by: FAMILY MEDICINE

## 2020-05-07 RX ORDER — FLUTICASONE PROPIONATE 50 MCG
2 SPRAY, SUSPENSION (ML) NASAL DAILY
Qty: 1 BOTTLE | Refills: 3 | Status: SHIPPED | OUTPATIENT
Start: 2020-05-07 | End: 2021-05-02

## 2020-05-07 RX ORDER — FEXOFENADINE HCL AND PSEUDOEPHEDRINE HCI 60; 120 MG/1; MG/1
1 TABLET, EXTENDED RELEASE ORAL
Qty: 30 TABLET | Refills: 0 | Status: SHIPPED | OUTPATIENT
Start: 2020-05-07 | End: 2020-07-13

## 2020-05-07 RX ORDER — MONTELUKAST SODIUM 10 MG/1
10 TABLET ORAL NIGHTLY
Qty: 30 TABLET | Refills: 1 | Status: SHIPPED | OUTPATIENT
Start: 2020-05-07 | End: 2020-07-13

## 2020-05-07 NOTE — TELEPHONE ENCOUNTER
Action Requested: Summary for Provider     []  Critical Lab, Recommendations Needed  [] Need Additional Advice  []   FYI    []   Need Orders  [] Need Medications Sent to Pharmacy  []  Other     SUMMARY: Spoke with the patient who reports for 3 weeks she ha

## 2020-05-07 NOTE — PROGRESS NOTES
This visit is conducted using Telemedicine with live, interactive video and audio. Patient understands and accepts financial responsibility for any deductible, co-insurance and/or co-pays associated with this service.     Florecita Sullivankevinabebe is a 51year old fema complete sentences with no distress. A&O x3. ASSESSMENT AND PLAN:   1. Seasonal allergies  Allegra D. flonase and singulair in evenings. If no improving, pt to let me know. Pt agrees.        The patient indicates understanding of these issues and agrees

## 2020-07-13 ENCOUNTER — OFFICE VISIT (OUTPATIENT)
Dept: FAMILY MEDICINE CLINIC | Facility: CLINIC | Age: 50
End: 2020-07-13

## 2020-07-13 VITALS
WEIGHT: 184.38 LBS | HEART RATE: 58 BPM | BODY MASS INDEX: 28.94 KG/M2 | SYSTOLIC BLOOD PRESSURE: 114 MMHG | HEIGHT: 67 IN | DIASTOLIC BLOOD PRESSURE: 66 MMHG | TEMPERATURE: 97 F

## 2020-07-13 DIAGNOSIS — M54.9 PAIN OF BACK AND RIGHT LOWER EXTREMITY: ICD-10-CM

## 2020-07-13 DIAGNOSIS — R10.84 GENERALIZED ABDOMINAL PAIN: ICD-10-CM

## 2020-07-13 DIAGNOSIS — M79.604 PAIN OF BACK AND RIGHT LOWER EXTREMITY: ICD-10-CM

## 2020-07-13 DIAGNOSIS — Z12.31 VISIT FOR SCREENING MAMMOGRAM: Primary | ICD-10-CM

## 2020-07-13 LAB
APPEARANCE: CLEAR
BILIRUBIN: NEGATIVE
GLUCOSE (URINE DIPSTICK): NEGATIVE MG/DL
KETONES (URINE DIPSTICK): NEGATIVE MG/DL
LEUKOCYTES: NEGATIVE
MULTISTIX LOT#: 1044 NUMERIC
NITRITE, URINE: NEGATIVE
PH, URINE: 7 (ref 4.5–8)
PROTEIN (URINE DIPSTICK): NEGATIVE MG/DL
SPECIFIC GRAVITY: 1.01 (ref 1–1.03)
UROBILINOGEN,SEMI-QN: 0.2 MG/DL (ref 0–1.9)

## 2020-07-13 PROCEDURE — 99213 OFFICE O/P EST LOW 20 MIN: CPT | Performed by: FAMILY MEDICINE

## 2020-07-13 PROCEDURE — 81003 URINALYSIS AUTO W/O SCOPE: CPT | Performed by: FAMILY MEDICINE

## 2020-07-13 RX ORDER — CYCLOBENZAPRINE HCL 5 MG
5 TABLET ORAL 3 TIMES DAILY PRN
Qty: 30 TABLET | Refills: 0 | Status: SHIPPED | OUTPATIENT
Start: 2020-07-13 | End: 2021-08-03 | Stop reason: ALTCHOICE

## 2020-07-13 NOTE — PROGRESS NOTES
Mariann Puckett is a 51year old female. Patient presents with:  Abdominal Pain: x 4 days    HPI:   Reports last few days with abdominal bloating and right upper quadrant pain and some pain in back. Does not have her GB. Taking the omeprazole. No fevers.  No v mammogram    - [facility] ANJUM 2D+3D SCREENING BILAT (CPT=77067/41482); Future    2. Generalized abdominal pain  Normal urine. Pain already improving - possible ovarian etiology but improved. - URINALYSIS, AUTO, W/O SCOPE    3.  Pain of back and right lower extr

## 2020-09-01 ENCOUNTER — NURSE TRIAGE (OUTPATIENT)
Dept: FAMILY MEDICINE CLINIC | Facility: CLINIC | Age: 50
End: 2020-09-01

## 2020-09-01 NOTE — TELEPHONE ENCOUNTER
Action Requested: Summary for Provider     []  Critical Lab, Recommendations Needed  [] Need Additional Advice  [x]   FYI    []   Need Orders  [] Need Medications Sent to Pharmacy  []  Other     SUMMARY:     Spoke with pt via [address] [place] [address] ID #

## 2020-09-02 ENCOUNTER — OFFICE VISIT (OUTPATIENT)
Dept: FAMILY MEDICINE CLINIC | Facility: CLINIC | Age: 50
End: 2020-09-02

## 2020-09-02 VITALS
WEIGHT: 184 LBS | BODY MASS INDEX: 28.88 KG/M2 | DIASTOLIC BLOOD PRESSURE: 69 MMHG | SYSTOLIC BLOOD PRESSURE: 110 MMHG | HEIGHT: 67 IN | HEART RATE: 60 BPM

## 2020-09-02 DIAGNOSIS — N63.10 PAINFUL LUMPY RIGHT BREAST: Primary | ICD-10-CM

## 2020-09-02 DIAGNOSIS — N64.4 PAINFUL LUMPY RIGHT BREAST: Primary | ICD-10-CM

## 2020-09-02 PROCEDURE — 3008F BODY MASS INDEX DOCD: CPT | Performed by: NURSE PRACTITIONER

## 2020-09-02 PROCEDURE — 3078F DIAST BP <80 MM HG: CPT | Performed by: NURSE PRACTITIONER

## 2020-09-02 PROCEDURE — 3074F SYST BP LT 130 MM HG: CPT | Performed by: NURSE PRACTITIONER

## 2020-09-02 PROCEDURE — 99213 OFFICE O/P EST LOW 20 MIN: CPT | Performed by: NURSE PRACTITIONER

## 2020-09-02 NOTE — PROGRESS NOTES
HPI  Pt presents for right breast pain x 1 month. Has mammogram scheduled in. 2 weeks. Pain is intermittent and is on right side of breast.  Had a cyst drained from that area about a year ago. Able to feel a lump when she lays on right side.      Review of Food insecurity:        Worry: Not on file        Inability: Not on file      Transportation needs:        Medical: Not on file        Non-medical: Not on file    Tobacco Use      Smoking status: Never Smoker      Smokeless tobacco: Never Used    [facility]OK Medication Sig Dispense Refill   • cyclobenzaprine 5 MG Oral Tab Take 1 tablet (5 mg total) by mouth 3 (three) times daily as needed for Muscle spasms. 30 tablet 0   • Fluticasone Propionate 50 MCG/ACT Nasal Suspension 2 sprays by Each Nare route daily.  1

## 2020-09-17 ENCOUNTER — HOSPITAL ENCOUNTER (OUTPATIENT)
Dept: ULTRASOUND IMAGING | Facility: HOSPITAL | Age: 50
Discharge: HOME | End: 2020-09-17
Attending: NURSE PRACTITIONER

## 2020-09-17 ENCOUNTER — HOSPITAL ENCOUNTER (OUTPATIENT)
Dept: MAMMOGRAPHY | Facility: HOSPITAL | Age: 50
Discharge: HOME | End: 2020-09-17
Attending: NURSE PRACTITIONER

## 2020-09-17 DIAGNOSIS — N64.4 PAINFUL LUMPY RIGHT BREAST: ICD-10-CM

## 2020-09-17 DIAGNOSIS — N63.10 PAINFUL LUMPY RIGHT BREAST: ICD-10-CM

## 2020-09-17 PROCEDURE — 77062 BREAST TOMOSYNTHESIS BI: CPT | Performed by: NURSE PRACTITIONER

## 2020-09-17 PROCEDURE — 76642 ULTRASOUND BREAST LIMITED: CPT | Performed by: NURSE PRACTITIONER

## 2020-09-17 PROCEDURE — 77066 DX MAMMO INCL CAD BI: CPT | Performed by: NURSE PRACTITIONER

## 2020-10-08 ENCOUNTER — OFFICE VISIT (OUTPATIENT)
Dept: FAMILY MEDICINE CLINIC | Facility: CLINIC | Age: 50
End: 2020-10-08

## 2020-10-08 VITALS
DIASTOLIC BLOOD PRESSURE: 57 MMHG | HEART RATE: 59 BPM | SYSTOLIC BLOOD PRESSURE: 111 MMHG | BODY MASS INDEX: 29 KG/M2 | WEIGHT: 185 LBS | TEMPERATURE: 98 F

## 2020-10-08 DIAGNOSIS — R42 DIZZINESS: ICD-10-CM

## 2020-10-08 DIAGNOSIS — Z13.21 ENCOUNTER FOR VITAMIN DEFICIENCY SCREENING: ICD-10-CM

## 2020-10-08 DIAGNOSIS — N60.19 FIBROCYSTIC BREAST DISEASE (FCBD), UNSPECIFIED LATERALITY: Primary | ICD-10-CM

## 2020-10-08 DIAGNOSIS — Z13.220 SCREENING, LIPID: ICD-10-CM

## 2020-10-08 PROCEDURE — 90686 IIV4 VACC NO PRSV 0.5 ML IM: CPT | Performed by: FAMILY MEDICINE

## 2020-10-08 PROCEDURE — 90471 IMMUNIZATION ADMIN: CPT | Performed by: FAMILY MEDICINE

## 2020-10-08 PROCEDURE — 3074F SYST BP LT 130 MM HG: CPT | Performed by: FAMILY MEDICINE

## 2020-10-08 PROCEDURE — 99214 OFFICE O/P EST MOD 30 MIN: CPT | Performed by: FAMILY MEDICINE

## 2020-10-08 PROCEDURE — 3078F DIAST BP <80 MM HG: CPT | Performed by: FAMILY MEDICINE

## 2020-10-08 NOTE — PROGRESS NOTES
Alexandra Vega is a 53year old female. Patient presents with:  Test Results: mammogram and ultrasound    HPI:   Wanted to discuss mammogram results. Reports feeling more dizziness for about a month - occasionally. Not all of the time.    Reports seeing ortho heartburn  NEURO: denies headaches  Musculoskeletal: no joint pain, positive back pain    EXAM:   /57   Pulse 59   Temp 97.8 °F (36.6 °C) (Oral)   Wt 185 lb (83.9 kg)   BMI 28.98 kg/m²   GENERAL: well developed, well nourished,in no apparent distress

## 2020-10-09 ENCOUNTER — LAB ENCOUNTER (OUTPATIENT)
Dept: LAB | Age: 50
End: 2020-10-09
Attending: FAMILY MEDICINE

## 2020-10-09 DIAGNOSIS — Z13.21 ENCOUNTER FOR VITAMIN DEFICIENCY SCREENING: ICD-10-CM

## 2020-10-09 DIAGNOSIS — Z13.220 SCREENING, LIPID: ICD-10-CM

## 2020-10-09 DIAGNOSIS — R42 DIZZINESS: ICD-10-CM

## 2020-10-09 PROCEDURE — 80061 LIPID PANEL: CPT

## 2020-10-09 PROCEDURE — 82306 VITAMIN D 25 HYDROXY: CPT

## 2020-10-09 PROCEDURE — 82607 VITAMIN B-12: CPT

## 2020-10-09 PROCEDURE — 36415 COLL VENOUS BLD VENIPUNCTURE: CPT

## 2020-10-09 PROCEDURE — 84443 ASSAY THYROID STIM HORMONE: CPT

## 2020-10-09 PROCEDURE — 80053 COMPREHEN METABOLIC PANEL: CPT

## 2020-10-09 PROCEDURE — 85025 COMPLETE CBC W/AUTO DIFF WBC: CPT

## 2020-10-19 ENCOUNTER — NURSE TRIAGE (OUTPATIENT)
Dept: FAMILY MEDICINE CLINIC | Facility: CLINIC | Age: 50
End: 2020-10-19

## 2020-10-19 NOTE — PROGRESS NOTES
[address] [place] were all normal except for vitamin D levels are low.  Please make sure taking daily supplements 2000 mg daily. - Dr. Reed End

## 2020-10-19 NOTE — TELEPHONE ENCOUNTER
Action Requested: Summary for Provider     []  Critical Lab, Recommendations Needed  [] Need Additional Advice  [x]   FYI    []   Need Orders  [] Need Medications Sent to Pharmacy  []  Other     SUMMARY: Patient  calling stating  her  was diagnosed

## 2020-10-20 ENCOUNTER — TELEPHONE (OUTPATIENT)
Dept: FAMILY MEDICINE CLINIC | Facility: CLINIC | Age: 50
End: 2020-10-20

## 2020-10-20 NOTE — TELEPHONE ENCOUNTER
Spoke with the patient who reports her  tested positive for COVID-19 over 2 weeks ago. Patient denies having any symptoms at the moment. Patient was advised to self-quarantine until 14 days after exposure.  Patient was also advised to wear a mask a

## 2020-10-21 NOTE — TELEPHONE ENCOUNTER
Why is she getting tested now? Below states no symptoms and  positive over 2 weeks ago - pt out of isolation time.     Pt was seen by me 10/8 in office and never mentioned this

## 2021-03-19 ENCOUNTER — NURSE TRIAGE (OUTPATIENT)
Dept: FAMILY MEDICINE CLINIC | Facility: CLINIC | Age: 51
End: 2021-03-19

## 2021-03-19 NOTE — TELEPHONE ENCOUNTER
Please reply to pool: EM RN TRIAGE    Action Requested: Summary for Provider     []  Critical Lab, Recommendations Needed  [] Need Additional Advice  []   FYI    []   Need Orders  [] Need Medications Sent to Pharmacy  []  Other     SUMMARY: Language Line u

## 2021-03-22 ENCOUNTER — TELEMEDICINE (OUTPATIENT)
Dept: FAMILY MEDICINE CLINIC | Facility: CLINIC | Age: 51
End: 2021-03-22

## 2021-03-22 ENCOUNTER — TELEPHONE (OUTPATIENT)
Dept: FAMILY MEDICINE CLINIC | Facility: CLINIC | Age: 51
End: 2021-03-22

## 2021-03-22 DIAGNOSIS — R05.9 COUGH: Primary | ICD-10-CM

## 2021-03-22 PROCEDURE — 99213 OFFICE O/P EST LOW 20 MIN: CPT | Performed by: FAMILY MEDICINE

## 2021-03-22 RX ORDER — FLUTICASONE PROPIONATE AND SALMETEROL 250; 50 UG/1; UG/1
1 POWDER RESPIRATORY (INHALATION) EVERY 12 HOURS SCHEDULED
Qty: 60 EACH | Refills: 5 | Status: SHIPPED | OUTPATIENT
Start: 2021-03-22 | End: 2021-08-03

## 2021-03-22 NOTE — TELEPHONE ENCOUNTER
Spoke to pharmacy since script sent in was for Advair not [address] [place] [address]. PA is needed for both medications. Called patient since unable to verify benefits, states she does not have any insurance. Called pharmacy to inform message.  They have been proce

## 2021-03-22 NOTE — TELEPHONE ENCOUNTER
Rx: Wixela Inhub Diskus 250/50MCG 60S  Sig: Inhale 1 Puff Into The Lungs Every 12 Hours. Qty: 61      Pharmacy Message:   Plan does not cover this medication.  Please call plan at [phone] to initiate prior authorization or call/fax pharmacy to [facility]

## 2021-03-22 NOTE — PROGRESS NOTES
This visit is conducted using Telemedicine with live, interactive video and audio. Patient has been referred to the [facility] website at [email or web address] to review the yearly Consent to Treat document.     Patient understands and accepts financial res well developed, well nourished,in no apparent distress  Speaking in complete sentences with no distress. A&Ox3  Not coughing. ASSESSMENT AND PLAN:   1. Cough  Trial of advair BID - rinse mouth after use. Discussed could be bronchospasm from allergies.

## 2021-08-03 ENCOUNTER — OFFICE VISIT (OUTPATIENT)
Dept: FAMILY MEDICINE CLINIC | Facility: CLINIC | Age: 51
End: 2021-08-03
Payer: COMMERCIAL

## 2021-08-03 VITALS
BODY MASS INDEX: 29.19 KG/M2 | SYSTOLIC BLOOD PRESSURE: 113 MMHG | WEIGHT: 186 LBS | HEIGHT: 67 IN | DIASTOLIC BLOOD PRESSURE: 72 MMHG | TEMPERATURE: 98 F | HEART RATE: 57 BPM

## 2021-08-03 DIAGNOSIS — J45.20 MILD INTERMITTENT ASTHMA WITHOUT COMPLICATION: ICD-10-CM

## 2021-08-03 DIAGNOSIS — J30.9 ALLERGIC RHINITIS, UNSPECIFIED SEASONALITY, UNSPECIFIED TRIGGER: ICD-10-CM

## 2021-08-03 DIAGNOSIS — J30.9 ALLERGIC RHINITIS, UNSPECIFIED SEASONALITY, UNSPECIFIED TRIGGER: Primary | ICD-10-CM

## 2021-08-03 PROCEDURE — 3078F DIAST BP <80 MM HG: CPT | Performed by: NURSE PRACTITIONER

## 2021-08-03 PROCEDURE — 3008F BODY MASS INDEX DOCD: CPT | Performed by: NURSE PRACTITIONER

## 2021-08-03 PROCEDURE — 99213 OFFICE O/P EST LOW 20 MIN: CPT | Performed by: NURSE PRACTITIONER

## 2021-08-03 PROCEDURE — 3074F SYST BP LT 130 MM HG: CPT | Performed by: NURSE PRACTITIONER

## 2021-08-03 RX ORDER — FLUTICASONE PROPIONATE 50 MCG
2 SPRAY, SUSPENSION (ML) NASAL DAILY
Qty: 18.2 ML | Refills: 0 | Status: SHIPPED | OUTPATIENT
Start: 2021-08-03 | End: 2021-08-03

## 2021-08-03 RX ORDER — LEVOCETIRIZINE DIHYDROCHLORIDE 5 MG/1
5 TABLET, FILM COATED ORAL EVERY EVENING
Qty: 90 TABLET | Refills: 1 | Status: SHIPPED | OUTPATIENT
Start: 2021-08-03

## 2021-08-03 RX ORDER — FLUTICASONE PROPIONATE 50 MCG
SPRAY, SUSPENSION (ML) NASAL
Qty: 48 G | Refills: 0 | Status: SHIPPED | OUTPATIENT
Start: 2021-08-03 | End: 2022-01-10

## 2021-08-03 RX ORDER — MONTELUKAST SODIUM 10 MG/1
10 TABLET ORAL NIGHTLY
Qty: 90 TABLET | Refills: 1 | Status: SHIPPED | OUTPATIENT
Start: 2021-08-03

## 2021-08-03 RX ORDER — FLUTICASONE PROPIONATE AND SALMETEROL 250; 50 UG/1; UG/1
1 POWDER RESPIRATORY (INHALATION) EVERY 12 HOURS SCHEDULED
Qty: 60 EACH | Refills: 5 | Status: SHIPPED | OUTPATIENT
Start: 2021-08-03 | End: 2021-09-20

## 2021-08-03 NOTE — PROGRESS NOTES
HPI    Patient presents for allergies. Has been taking otc medications without relief. Would like a refill on inhaler for asthma. Review of Systems   HENT: Positive for congestion. Allergic/Immunologic: Positive for environmental allergies.    Al Highest education level: Not on file    Occupational History      Occupation: [employer]        Comment: disabled    Tobacco Use      Smoking status: Never Smoker      Smokeless tobacco: Never Used    Vaping Use      Vaping Use: Never used    Substance and S     Marital Status:   Intimate Partner Violence:       Fear of Current or Ex-Partner:       Emotionally Abused:       Physically Abused:       Sexually Abused:     Current Outpatient Medications   Medication Sig Dispense Refill   • Fluticasone Propionate Addressed This Visit     None      Visit Diagnoses     Allergic rhinitis, unspecified seasonality, unspecified trigger    -  Primary    Relevant Medications    Fluticasone Propionate 50 MCG/ACT Nasal Suspension    fluticasone-salmeterol (ADVAIR DISKUS) 250

## 2021-08-03 NOTE — TELEPHONE ENCOUNTER
Patient requesting 909 day supply    Please advise and thank you. Refill passed per [facility] protocol.   Requested Prescriptions   Pending Prescriptions Disp Refills    FLUTICASONE PROPIONATE 50 MCG/ACT Nasal Suspension [Pharmacy Med Name: Basilio Calderon

## 2021-08-11 ENCOUNTER — OFFICE VISIT (OUTPATIENT)
Dept: FAMILY MEDICINE CLINIC | Facility: CLINIC | Age: 51
End: 2021-08-11
Payer: COMMERCIAL

## 2021-08-11 VITALS
DIASTOLIC BLOOD PRESSURE: 72 MMHG | HEART RATE: 60 BPM | HEIGHT: 67 IN | BODY MASS INDEX: 29.35 KG/M2 | SYSTOLIC BLOOD PRESSURE: 125 MMHG | WEIGHT: 187 LBS | TEMPERATURE: 98 F

## 2021-08-11 DIAGNOSIS — R09.81 CHRONIC NASAL CONGESTION: ICD-10-CM

## 2021-08-11 DIAGNOSIS — J01.10 ACUTE NON-RECURRENT FRONTAL SINUSITIS: Primary | ICD-10-CM

## 2021-08-11 PROCEDURE — 3078F DIAST BP <80 MM HG: CPT | Performed by: NURSE PRACTITIONER

## 2021-08-11 PROCEDURE — 3008F BODY MASS INDEX DOCD: CPT | Performed by: NURSE PRACTITIONER

## 2021-08-11 PROCEDURE — 3074F SYST BP LT 130 MM HG: CPT | Performed by: NURSE PRACTITIONER

## 2021-08-11 PROCEDURE — 99214 OFFICE O/P EST MOD 30 MIN: CPT | Performed by: NURSE PRACTITIONER

## 2021-08-11 RX ORDER — AMOXICILLIN AND CLAVULANATE POTASSIUM 875; 125 MG/1; MG/1
1 TABLET, FILM COATED ORAL 2 TIMES DAILY
Qty: 20 TABLET | Refills: 0 | Status: SHIPPED | OUTPATIENT
Start: 2021-08-11 | End: 2021-08-21

## 2021-08-11 NOTE — PROGRESS NOTES
HPI    Patient presents for follow up for nasal congestion. With lots of headache and pain in nose. Has been taking all allergy medications without relief. Review of Systems   HENT: Positive for congestion. Neurological: Positive for headaches. Not on file      Number of children: 2      Years of education: Not on file      Highest education level: Not on file    Occupational History      Occupation: [employer]        Comment: disabled    Tobacco Use      Smoking status: Never Smoker      Smokeless Member of Clubs or Organizations:       Attends Club or Organization Meetings:       Marital Status:   Intimate Partner Violence:       Fear of Current or Ex-Partner:       Emotionally Abused:       Physically Abused:       Sexually Abused:     Current Out Neurological:      Mental Status: She is alert and oriented to person, place, and time. Psychiatric:         Mood and Affect: Mood normal.         Behavior: Behavior normal.         Thought Content:  Thought content normal.         Judgment: Judgment no

## 2021-09-16 ENCOUNTER — OFFICE VISIT (OUTPATIENT)
Dept: OTOLARYNGOLOGY | Facility: CLINIC | Age: 51
End: 2021-09-16
Payer: COMMERCIAL

## 2021-09-16 VITALS — HEIGHT: 67 IN | BODY MASS INDEX: 28.72 KG/M2 | WEIGHT: 183 LBS

## 2021-09-16 DIAGNOSIS — J34.2 DEVIATED NASAL SEPTUM: ICD-10-CM

## 2021-09-16 DIAGNOSIS — R09.81 NASAL CONGESTION: Primary | ICD-10-CM

## 2021-09-16 PROCEDURE — 3008F BODY MASS INDEX DOCD: CPT | Performed by: OTOLARYNGOLOGY

## 2021-09-16 PROCEDURE — 99213 OFFICE O/P EST LOW 20 MIN: CPT | Performed by: OTOLARYNGOLOGY

## 2021-09-16 NOTE — PROGRESS NOTES
Shell Archer is a 49year old female. Patient presents with:  Nasal Congestion: pt presents today for both nostrils clogged and unable to breath correctly.        HISTORY OF PRESENT ILLNESS  She presents with a right-sided eyebrow lesion is been there for ye problem    • Degenerative joint disease (DJD) of lumbar spine    • Peptic ulcer disease    • Uterine fibroid    • Vitamin D deficiency        Past Surgical History:   Procedure Laterality Date   • CHOLECYSTECTOMY     • CYST ASPIRATION LEFT     • EXC SKIN B Nasopharynx Normal External nose - Normal. Lips/teeth/gums - Normal. Tonsils - Normal. Oropharynx - Normal.   Ears Normal Inspection - Right: Normal, Left: Normal. Canal - Right: Normal, Left: Normal. TM - Right: Normal, Left: Normal.   Skin Normal Inspe

## 2021-09-20 ENCOUNTER — OFFICE VISIT (OUTPATIENT)
Dept: FAMILY MEDICINE CLINIC | Facility: CLINIC | Age: 51
End: 2021-09-20
Payer: COMMERCIAL

## 2021-09-20 VITALS
SYSTOLIC BLOOD PRESSURE: 116 MMHG | HEART RATE: 62 BPM | BODY MASS INDEX: 29.7 KG/M2 | WEIGHT: 189.19 LBS | HEIGHT: 67 IN | DIASTOLIC BLOOD PRESSURE: 64 MMHG

## 2021-09-20 DIAGNOSIS — J45.20 MILD INTERMITTENT ASTHMA WITHOUT COMPLICATION: ICD-10-CM

## 2021-09-20 DIAGNOSIS — J30.9 ALLERGIC RHINITIS, UNSPECIFIED SEASONALITY, UNSPECIFIED TRIGGER: ICD-10-CM

## 2021-09-20 DIAGNOSIS — Z12.31 VISIT FOR SCREENING MAMMOGRAM: ICD-10-CM

## 2021-09-20 DIAGNOSIS — Z12.11 SCREEN FOR COLON CANCER: ICD-10-CM

## 2021-09-20 DIAGNOSIS — Z00.00 ROUTINE MEDICAL EXAM: ICD-10-CM

## 2021-09-20 DIAGNOSIS — Z13.21 ENCOUNTER FOR VITAMIN DEFICIENCY SCREENING: ICD-10-CM

## 2021-09-20 DIAGNOSIS — Z13.220 SCREENING, LIPID: Primary | ICD-10-CM

## 2021-09-20 PROCEDURE — 90686 IIV4 VACC NO PRSV 0.5 ML IM: CPT | Performed by: FAMILY MEDICINE

## 2021-09-20 PROCEDURE — 90471 IMMUNIZATION ADMIN: CPT | Performed by: FAMILY MEDICINE

## 2021-09-20 PROCEDURE — 99396 PREV VISIT EST AGE 40-64: CPT | Performed by: FAMILY MEDICINE

## 2021-09-20 PROCEDURE — 3074F SYST BP LT 130 MM HG: CPT | Performed by: FAMILY MEDICINE

## 2021-09-20 PROCEDURE — 3078F DIAST BP <80 MM HG: CPT | Performed by: FAMILY MEDICINE

## 2021-09-20 PROCEDURE — 3008F BODY MASS INDEX DOCD: CPT | Performed by: FAMILY MEDICINE

## 2021-09-20 RX ORDER — OMEPRAZOLE 20 MG/1
20 CAPSULE, DELAYED RELEASE ORAL
Qty: 30 CAPSULE | Refills: 2 | Status: SHIPPED | OUTPATIENT
Start: 2021-09-20 | End: 2022-09-20

## 2021-09-20 RX ORDER — FLUTICASONE PROPIONATE AND SALMETEROL 250; 50 UG/1; UG/1
1 POWDER RESPIRATORY (INHALATION) EVERY 12 HOURS SCHEDULED
Qty: 60 EACH | Refills: 5 | Status: SHIPPED | OUTPATIENT
Start: 2021-09-20

## 2021-09-20 RX ORDER — FLUTICASONE PROPIONATE AND SALMETEROL 250; 50 UG/1; UG/1
1 POWDER RESPIRATORY (INHALATION) EVERY 12 HOURS SCHEDULED
Qty: 60 EACH | Refills: 5 | COMMUNITY
Start: 2021-09-20 | End: 2021-09-20

## 2021-09-20 NOTE — PROGRESS NOTES
HPI:   Sandra Kunz is a 49year old female who presents for a complete physical exam.    Reports mild cough for few days and using advair. Was not using it regularly. Sometimes has cough with phlegm. Taking both pills in evenings for allergies.    Wt Readi tobacco: Never Used    Vaping Use      Vaping Use: Never used    Alcohol use: No    Drug use: Never         REVIEW OF SYSTEMS:   GENERAL: feels well otherwise  SKIN: denies any skin lesions  EYES:denies blurred vision or double vision  HEENT: denies nasal complication  Discussed using the Advair daily right now since symptoms are worse.      6. Screen for colon cancer    - Olesya Young MD  9/20/2021  1:36 PM

## 2021-09-21 ENCOUNTER — LAB ENCOUNTER (OUTPATIENT)
Dept: LAB | Age: 51
End: 2021-09-21
Attending: FAMILY MEDICINE
Payer: COMMERCIAL

## 2021-09-21 DIAGNOSIS — Z13.21 ENCOUNTER FOR VITAMIN DEFICIENCY SCREENING: ICD-10-CM

## 2021-09-21 DIAGNOSIS — Z00.00 ROUTINE MEDICAL EXAM: ICD-10-CM

## 2021-09-21 LAB
ALBUMIN SERPL-MCNC: 3.7 G/DL (ref 3.4–5)
ALBUMIN/GLOB SERPL: 1.1 {RATIO} (ref 1–2)
ALP LIVER SERPL-CCNC: 59 U/L
ALT SERPL-CCNC: 25 U/L
ANION GAP SERPL CALC-SCNC: 5 MMOL/L (ref 0–18)
AST SERPL-CCNC: 20 U/L (ref 15–37)
BASOPHILS # BLD AUTO: 0.04 X10(3) UL (ref 0–0.2)
BASOPHILS NFR BLD AUTO: 0.7 %
BILIRUB SERPL-MCNC: 0.4 MG/DL (ref 0.1–2)
BUN BLD-MCNC: 9 MG/DL (ref 7–18)
BUN/CREAT SERPL: 18.4 (ref 10–20)
CALCIUM BLD-MCNC: 9 MG/DL (ref 8.5–10.1)
CHLORIDE SERPL-SCNC: 109 MMOL/L (ref 98–112)
CHOLEST SERPL-MCNC: 166 MG/DL (ref ?–200)
CO2 SERPL-SCNC: 25 MMOL/L (ref 21–32)
CREAT BLD-MCNC: 0.49 MG/DL
DEPRECATED RDW RBC AUTO: 45.7 FL (ref 35.1–46.3)
EOSINOPHIL # BLD AUTO: 0.65 X10(3) UL (ref 0–0.7)
EOSINOPHIL NFR BLD AUTO: 11 %
ERYTHROCYTE [DISTWIDTH] IN BLOOD BY AUTOMATED COUNT: 12.8 % (ref 11–15)
GLOBULIN PLAS-MCNC: 3.4 G/DL (ref 2.8–4.4)
GLUCOSE BLD-MCNC: 77 MG/DL (ref 70–99)
HCT VFR BLD AUTO: 42.5 %
HDLC SERPL-MCNC: 54 MG/DL (ref 40–59)
HGB BLD-MCNC: 13.8 G/DL
IMM GRANULOCYTES # BLD AUTO: 0.02 X10(3) UL (ref 0–1)
IMM GRANULOCYTES NFR BLD: 0.3 %
LDLC SERPL CALC-MCNC: 93 MG/DL (ref ?–100)
LYMPHOCYTES # BLD AUTO: 1.47 X10(3) UL (ref 1–4)
LYMPHOCYTES NFR BLD AUTO: 24.9 %
MCH RBC QN AUTO: 31.7 PG (ref 26–34)
MCHC RBC AUTO-ENTMCNC: 32.5 G/DL (ref 31–37)
MCV RBC AUTO: 97.5 FL
MONOCYTES # BLD AUTO: 0.49 X10(3) UL (ref 0.1–1)
MONOCYTES NFR BLD AUTO: 8.3 %
NEUTROPHILS # BLD AUTO: 3.24 X10 (3) UL (ref 1.5–7.7)
NEUTROPHILS # BLD AUTO: 3.24 X10(3) UL (ref 1.5–7.7)
NEUTROPHILS NFR BLD AUTO: 54.8 %
NONHDLC SERPL-MCNC: 112 MG/DL (ref ?–130)
OSMOLALITY SERPL CALC.SUM OF ELEC: 285 MOSM/KG (ref 275–295)
PATIENT FASTING Y/N/NP: YES
PATIENT FASTING Y/N/NP: YES
PLATELET # BLD AUTO: 262 10(3)UL (ref 150–450)
POTASSIUM SERPL-SCNC: 4.1 MMOL/L (ref 3.5–5.1)
PROT SERPL-MCNC: 7.1 G/DL (ref 6.4–8.2)
RBC # BLD AUTO: 4.36 X10(6)UL
SODIUM SERPL-SCNC: 139 MMOL/L (ref 136–145)
TRIGL SERPL-MCNC: 105 MG/DL (ref 30–149)
TSI SER-ACNC: 1.08 MIU/ML (ref 0.36–3.74)
VIT B12 SERPL-MCNC: 329 PG/ML (ref 193–986)
VIT D+METAB SERPL-MCNC: 22.7 NG/ML (ref 30–100)
VLDLC SERPL CALC-MCNC: 17 MG/DL (ref 0–30)
WBC # BLD AUTO: 5.9 X10(3) UL (ref 4–11)

## 2021-09-21 PROCEDURE — 82306 VITAMIN D 25 HYDROXY: CPT

## 2021-09-21 PROCEDURE — 36415 COLL VENOUS BLD VENIPUNCTURE: CPT

## 2021-09-21 PROCEDURE — 80053 COMPREHEN METABOLIC PANEL: CPT

## 2021-09-21 PROCEDURE — 82607 VITAMIN B-12: CPT

## 2021-09-21 PROCEDURE — 84443 ASSAY THYROID STIM HORMONE: CPT

## 2021-09-21 PROCEDURE — 85025 COMPLETE CBC W/AUTO DIFF WBC: CPT

## 2021-09-21 PROCEDURE — 80061 LIPID PANEL: CPT

## 2021-09-27 NOTE — PROGRESS NOTES
WALT Gonzalez -your labs are all normal except for low vitamin D. Please take extra vitamin D 2000 units daily.   Your cholesterol, glucose, liver, and thyroid function were all normal. - Dr. Kay Marinelli

## 2021-10-13 ENCOUNTER — OFFICE VISIT (OUTPATIENT)
Dept: FAMILY MEDICINE CLINIC | Facility: CLINIC | Age: 51
End: 2021-10-13
Payer: COMMERCIAL

## 2021-10-13 VITALS
BODY MASS INDEX: 29.98 KG/M2 | TEMPERATURE: 98 F | HEIGHT: 67 IN | DIASTOLIC BLOOD PRESSURE: 75 MMHG | SYSTOLIC BLOOD PRESSURE: 119 MMHG | WEIGHT: 191 LBS | HEART RATE: 64 BPM

## 2021-10-13 DIAGNOSIS — N94.9 VAGINAL BURNING: Primary | ICD-10-CM

## 2021-10-13 DIAGNOSIS — N89.8 VAGINAL DISCHARGE: ICD-10-CM

## 2021-10-13 PROCEDURE — 99214 OFFICE O/P EST MOD 30 MIN: CPT | Performed by: NURSE PRACTITIONER

## 2021-10-13 PROCEDURE — 81003 URINALYSIS AUTO W/O SCOPE: CPT | Performed by: NURSE PRACTITIONER

## 2021-10-13 PROCEDURE — 3008F BODY MASS INDEX DOCD: CPT | Performed by: NURSE PRACTITIONER

## 2021-10-13 PROCEDURE — 3078F DIAST BP <80 MM HG: CPT | Performed by: NURSE PRACTITIONER

## 2021-10-13 PROCEDURE — 3074F SYST BP LT 130 MM HG: CPT | Performed by: NURSE PRACTITIONER

## 2021-10-13 RX ORDER — FLUCONAZOLE 200 MG/1
TABLET ORAL
Qty: 2 TABLET | Refills: 0 | Status: SHIPPED | OUTPATIENT
Start: 2021-10-13 | End: 2021-11-09

## 2021-10-13 NOTE — PROGRESS NOTES
HPI    Patient presents for vaginal burning and discharge for the past 2 weeks. Has tried otc creams without relief. Review of Systems   Genitourinary: Positive for vaginal discharge.          10/13/21  1147   BP: 119/75   Pulse: 64   Temp: 97.7 °F (3 file      Highest education level: Not on file    Occupational History      Occupation: [employer]        Comment: disabled    Tobacco Use      Smoking status: Never Smoker      Smokeless tobacco: Never Used    Vaping Use      Vaping Use: Never used    Subst Attends Faith Services: Not on file      Active Member of Clubs or Organizations: Not on file      Attends Club or Organization Meetings: Not on file      Marital Status: Not on file  Intimate Partner Violence:       Fear of Current or Ex-Partner: Not effort is normal. No respiratory distress. Breath sounds: Normal breath sounds. No stridor. No wheezing, rhonchi or rales. Chest:      Chest wall: No tenderness. Genitourinary:     Vagina: Vaginal discharge present.    Skin:     General: Skin is wa

## 2021-10-28 ENCOUNTER — OFFICE VISIT (OUTPATIENT)
Dept: OTOLARYNGOLOGY | Facility: CLINIC | Age: 51
End: 2021-10-28
Payer: COMMERCIAL

## 2021-10-28 VITALS — BODY MASS INDEX: 29.98 KG/M2 | WEIGHT: 191 LBS | HEIGHT: 67 IN

## 2021-10-28 DIAGNOSIS — J34.2 DEVIATED NASAL SEPTUM: ICD-10-CM

## 2021-10-28 DIAGNOSIS — R09.81 NASAL CONGESTION: Primary | ICD-10-CM

## 2021-10-28 PROCEDURE — 99213 OFFICE O/P EST LOW 20 MIN: CPT | Performed by: OTOLARYNGOLOGY

## 2021-10-28 PROCEDURE — 3008F BODY MASS INDEX DOCD: CPT | Performed by: OTOLARYNGOLOGY

## 2021-10-28 RX ORDER — AZELASTINE 1 MG/ML
2 SPRAY, METERED NASAL 2 TIMES DAILY
Qty: 30 ML | Refills: 3 | Status: SHIPPED | OUTPATIENT
Start: 2021-10-28 | End: 2022-01-10

## 2021-10-28 RX ORDER — PSEUDOEPHEDRINE HCL 120 MG/1
120 TABLET, FILM COATED, EXTENDED RELEASE ORAL EVERY 12 HOURS
Qty: 60 TABLET | Refills: 3 | Status: SHIPPED | OUTPATIENT
Start: 2021-10-28

## 2021-10-28 NOTE — PROGRESS NOTES
Haider Faye is a 49year old female. Patient presents with:   Follow - Up: Pt is here for follow up on nasal congestion pt says she still feels the same not much improvement      HISTORY OF PRESENT ILLNESS  She presents with a right-sided eyebrow lesion is Relation Age of Onset   • Diabetes Maternal Grandmother    • Hypertension Maternal Grandmother    • Hypertension Father    • Hypertension Mother    • Hypertension Maternal Grandfather        Past Medical History:   Diagnosis Date   • Back problem    • Daina Normal. Fundus - Right: Normal, Left: Normal.   Neurological Normal Memory - Normal. Cranial nerves - Cranial nerves II through XII grossly intact.    Head/Face Normal Facial features - Normal. Eyebrows - Normal. Skull - Normal.        Nasopharynx Normal Ex SPRAYS IN EACH NOSTRIL DAILY, Disp: 48 g, Rfl: 0  •  traMADol HCl 50 MG Oral Tab, , Disp: , Rfl: 0  •  Cholecalciferol (VITAMIN D) 2000 units Oral Cap, Take 1,000 capsules by mouth daily. , Disp: , Rfl:   ASSESSMENT AND PLAN    1. Nasal congestion    2.  D

## 2021-11-08 ENCOUNTER — TELEPHONE (OUTPATIENT)
Dept: FAMILY MEDICINE CLINIC | Facility: CLINIC | Age: 51
End: 2021-11-08

## 2021-11-08 NOTE — TELEPHONE ENCOUNTER
Using language line  [id number] [facility]: The patient was seen on 10/13/21 for vaginal itching . Per the patient she took the Flluconazole tablets. She stated was better for a little bit but the vaginal infection has come back.     Has a l

## 2021-11-09 RX ORDER — FLUCONAZOLE 200 MG/1
TABLET ORAL
Qty: 2 TABLET | Refills: 0 | Status: SHIPPED | OUTPATIENT
Start: 2021-11-09 | End: 2022-01-06 | Stop reason: ALTCHOICE

## 2021-11-09 NOTE — TELEPHONE ENCOUNTER
Please inform vagina culture was normal.  I sent in fluconazole to take again, if symptoms persist she should follow up with gyne for assessment.

## 2021-12-02 ENCOUNTER — OFFICE VISIT (OUTPATIENT)
Dept: OTOLARYNGOLOGY | Facility: CLINIC | Age: 51
End: 2021-12-02
Payer: COMMERCIAL

## 2021-12-02 VITALS — HEIGHT: 67 IN | BODY MASS INDEX: 29.98 KG/M2 | WEIGHT: 191 LBS

## 2021-12-02 DIAGNOSIS — J34.2 DEVIATED NASAL SEPTUM: Primary | ICD-10-CM

## 2021-12-02 PROCEDURE — 3008F BODY MASS INDEX DOCD: CPT | Performed by: OTOLARYNGOLOGY

## 2021-12-02 PROCEDURE — 99214 OFFICE O/P EST MOD 30 MIN: CPT | Performed by: OTOLARYNGOLOGY

## 2021-12-02 NOTE — PROGRESS NOTES
Carlos Peoples is a 49year old female. Patient presents with: Follow - Up: Pt is here for 1 month f/u says medication has helped only alittle, still has congestion and post nasal drip.        HISTORY OF PRESENT ILLNESS  She presents with a right-sided eyebro Occupation: [employer]        Comment: disabled    Tobacco Use      Smoking status: Never Smoker      Smokeless tobacco: Never Used    Vaping Use      Vaping Use: Never used    Substance and Sexual Activity      Alcohol use: No      Drug use: Never    Other Easy bleeding and easy bruising.            PHYSICAL EXAM    Ht 5' 7\" (1.702 m)   Wt 191 lb (86.6 kg)   BMI 29.91 kg/m²        Constitutional Normal Overall appearance - Normal.   Psychiatric Normal Orientation - Oriented to time, place, person & situation before breakfast., Disp: 30 capsule, Rfl: 2  •  fluticasone-salmeterol (ADVAIR DISKUS) 250-50 MCG/DOSE Inhalation Aerosol Powder, Breath Activated, Inhale 1 puff into the lungs every 12 (twelve) hours. , Disp: 60 each, Rfl: 5  •  Levocetirizine Dihydrochlor

## 2021-12-03 ENCOUNTER — PATIENT MESSAGE (OUTPATIENT)
Dept: OTOLARYNGOLOGY | Facility: CLINIC | Age: 51
End: 2021-12-03

## 2021-12-03 ENCOUNTER — TELEPHONE (OUTPATIENT)
Dept: FAMILY MEDICINE CLINIC | Facility: CLINIC | Age: 51
End: 2021-12-03

## 2021-12-03 DIAGNOSIS — Z01.818 PREOPERATIVE CLEARANCE: Primary | ICD-10-CM

## 2021-12-03 NOTE — TELEPHONE ENCOUNTER
Patient is scheduled for pre-op clearance appt on 1/6/22. Patient is scheduled for surgery on 1/10/22. Dr. Magali Kapadia  Otolargology  Nasal Surgery    Patient states she will need lab and EKG exam order before the scheduled appt on 1/6/22.

## 2021-12-06 NOTE — TELEPHONE ENCOUNTER
From: Alexandra Larson  To: [facility]. Lisbeth Carlson MD  Sent: 12/3/2021 4:41 PM CST  Subject: Forms to help get disability     Good afternoon Dr. Lisbeth Carlson,    Attached are some documents that need to be filled out. The form will be used to help me get disability benefits.

## 2021-12-07 ENCOUNTER — TELEPHONE (OUTPATIENT)
Dept: FAMILY MEDICINE CLINIC | Facility: CLINIC | Age: 51
End: 2021-12-07

## 2021-12-07 NOTE — TELEPHONE ENCOUNTER
Pt's daughter Marialuisa Taylor called checking the status of disability forms for pt. Informed daughter of turn around time, fee for processing, and that consent will be required, daughter verbalized understanding. Pt has been off of work since 6/24/2017 due to back and left knee pain. Pt had back surgery on 8/20/2018 and left knee surgery on 3/20/2019. Informed pt that forms cannot be sent to Dr Alva Hinojosa since he is a specialist and has not seen or treated the pt for back and knee pain, daughter verbalized understanding. [employer] message sent for missing HIPAA.

## 2021-12-28 ENCOUNTER — TELEPHONE (OUTPATIENT)
Dept: OTOLARYNGOLOGY | Facility: CLINIC | Age: 51
End: 2021-12-28

## 2021-12-28 NOTE — TELEPHONE ENCOUNTER
Current Outpatient Medications   Medication Sig Dispense Refill   • Pseudoephedrine HCl  MG Oral Tablet 12 Hr Take 1 tablet (120 mg total) by mouth every 12 (twelve) hours.  60 tablet 3     Per pt pharmacy only provided 20 tablets, has already used tw

## 2021-12-29 ENCOUNTER — LAB ENCOUNTER (OUTPATIENT)
Dept: LAB | Age: 51
End: 2021-12-29
Attending: FAMILY MEDICINE
Payer: COMMERCIAL

## 2021-12-29 DIAGNOSIS — Z01.818 PREOPERATIVE CLEARANCE: ICD-10-CM

## 2021-12-29 LAB
ANION GAP SERPL CALC-SCNC: 7 MMOL/L (ref 0–18)
BASOPHILS # BLD AUTO: 0.08 X10(3) UL (ref 0–0.2)
BASOPHILS NFR BLD AUTO: 1.6 %
BUN BLD-MCNC: 8 MG/DL (ref 7–18)
BUN/CREAT SERPL: 14.3 (ref 10–20)
CALCIUM BLD-MCNC: 9.1 MG/DL (ref 8.5–10.1)
CHLORIDE SERPL-SCNC: 106 MMOL/L (ref 98–112)
CO2 SERPL-SCNC: 28 MMOL/L (ref 21–32)
CREAT BLD-MCNC: 0.56 MG/DL
DEPRECATED RDW RBC AUTO: 46.1 FL (ref 35.1–46.3)
EOSINOPHIL # BLD AUTO: 0.65 X10(3) UL (ref 0–0.7)
EOSINOPHIL NFR BLD AUTO: 12.8 %
ERYTHROCYTE [DISTWIDTH] IN BLOOD BY AUTOMATED COUNT: 13 % (ref 11–15)
FASTING STATUS PATIENT QL REPORTED: YES
GLUCOSE BLD-MCNC: 90 MG/DL (ref 70–99)
HCT VFR BLD AUTO: 45.2 %
HGB BLD-MCNC: 14.7 G/DL
IMM GRANULOCYTES # BLD AUTO: 0.01 X10(3) UL (ref 0–1)
IMM GRANULOCYTES NFR BLD: 0.2 %
LYMPHOCYTES # BLD AUTO: 1.63 X10(3) UL (ref 1–4)
LYMPHOCYTES NFR BLD AUTO: 32 %
MCH RBC QN AUTO: 31.3 PG (ref 26–34)
MCHC RBC AUTO-ENTMCNC: 32.5 G/DL (ref 31–37)
MCV RBC AUTO: 96.2 FL
MONOCYTES # BLD AUTO: 0.57 X10(3) UL (ref 0.1–1)
MONOCYTES NFR BLD AUTO: 11.2 %
NEUTROPHILS # BLD AUTO: 2.15 X10 (3) UL (ref 1.5–7.7)
NEUTROPHILS # BLD AUTO: 2.15 X10(3) UL (ref 1.5–7.7)
NEUTROPHILS NFR BLD AUTO: 42.2 %
OSMOLALITY SERPL CALC.SUM OF ELEC: 290 MOSM/KG (ref 275–295)
PLATELET # BLD AUTO: 245 10(3)UL (ref 150–450)
POTASSIUM SERPL-SCNC: 3.9 MMOL/L (ref 3.5–5.1)
RBC # BLD AUTO: 4.7 X10(6)UL
SODIUM SERPL-SCNC: 141 MMOL/L (ref 136–145)
WBC # BLD AUTO: 5.1 X10(3) UL (ref 4–11)

## 2021-12-29 PROCEDURE — 85025 COMPLETE CBC W/AUTO DIFF WBC: CPT

## 2021-12-29 PROCEDURE — 80048 BASIC METABOLIC PNL TOTAL CA: CPT

## 2021-12-29 PROCEDURE — 93010 ELECTROCARDIOGRAM REPORT: CPT | Performed by: FAMILY MEDICINE

## 2021-12-29 PROCEDURE — 93005 ELECTROCARDIOGRAM TRACING: CPT

## 2021-12-29 PROCEDURE — 36415 COLL VENOUS BLD VENIPUNCTURE: CPT

## 2021-12-29 NOTE — TELEPHONE ENCOUNTER
Per pharmacy patient already got the medication paid out of pocket as insurance will not cover OTC medication.

## 2021-12-29 NOTE — TELEPHONE ENCOUNTER
Returned pt's call using [address] [place] [address] ID number [id number], no answer, left message to call back.

## 2022-01-04 NOTE — TELEPHONE ENCOUNTER
Returned pt's daughter's call, informed that due to high volumes we are required to work on all forms from the order we receive them, daughter verbalized understanding. Daughter also asked about fee, informed her that fee can be made via [address], mail, or at [facility] , daughter verbalized understanding.

## 2022-01-04 NOTE — TELEPHONE ENCOUNTER
Dr. Gail Tafoya,    Patient has been off work since 6/2017 is applying for total disability. The form that is required is detailed and specific and should be completed by a physician. She uploaded a copy that you can review on 12/3 patient message. We can send medical records in lieu of form. Please Advise?     Thank [facility]

## 2022-01-05 ENCOUNTER — HOSPITAL ENCOUNTER (OUTPATIENT)
Dept: MAMMOGRAPHY | Facility: HOSPITAL | Age: 52
Discharge: HOME | End: 2022-01-05
Attending: FAMILY MEDICINE
Payer: COMMERCIAL

## 2022-01-05 DIAGNOSIS — Z12.31 VISIT FOR SCREENING MAMMOGRAM: ICD-10-CM

## 2022-01-05 PROCEDURE — 77063 BREAST TOMOSYNTHESIS BI: CPT | Performed by: FAMILY MEDICINE

## 2022-01-05 PROCEDURE — 77067 SCR MAMMO BI INCL CAD: CPT | Performed by: FAMILY MEDICINE

## 2022-01-06 ENCOUNTER — OFFICE VISIT (OUTPATIENT)
Dept: FAMILY MEDICINE CLINIC | Facility: CLINIC | Age: 52
End: 2022-01-06
Payer: COMMERCIAL

## 2022-01-06 VITALS
TEMPERATURE: 97 F | DIASTOLIC BLOOD PRESSURE: 65 MMHG | HEIGHT: 67 IN | HEART RATE: 60 BPM | SYSTOLIC BLOOD PRESSURE: 120 MMHG | BODY MASS INDEX: 29.98 KG/M2 | WEIGHT: 191 LBS

## 2022-01-06 DIAGNOSIS — Z01.818 PREOPERATIVE CLEARANCE: Primary | ICD-10-CM

## 2022-01-06 DIAGNOSIS — R09.81 NASAL CONGESTION: ICD-10-CM

## 2022-01-06 PROCEDURE — 3008F BODY MASS INDEX DOCD: CPT | Performed by: FAMILY MEDICINE

## 2022-01-06 PROCEDURE — 99214 OFFICE O/P EST MOD 30 MIN: CPT | Performed by: FAMILY MEDICINE

## 2022-01-06 PROCEDURE — 3078F DIAST BP <80 MM HG: CPT | Performed by: FAMILY MEDICINE

## 2022-01-06 PROCEDURE — 3074F SYST BP LT 130 MM HG: CPT | Performed by: FAMILY MEDICINE

## 2022-01-06 NOTE — PROGRESS NOTES
HPI:   Brett Jo is a 49year old female who presents for a complete physical exam for preoperative clearance for planned septoplasty and submucous turbinate reduction. Last surgery few years ago with no reaction.  Has anesthesia with back injection an • Peptic ulcer disease    • Uterine fibroid    • Vitamin D deficiency       Past Surgical History:   Procedure Laterality Date   • CHOLECYSTECTOMY     • CYST ASPIRATION LEFT     • EXC SKIN BENIG 2.1-3CM FACE,FACIAL Right 10/14/2019   • HC PUNCTURE ASPIRA normocephalic,ears and throat are clear  EYES:PERRLA, EOMI,conjunctiva are clear  NECK: supple,no adenopathy,no bruits  CHEST: no chest tenderness  LUNGS: clear to auscultation  CARDIO: RRR without murmur  GI: good BS's,no masses, HSM or tenderness  EXTREM

## 2022-01-07 ENCOUNTER — LAB ENCOUNTER (OUTPATIENT)
Dept: LAB | Facility: HOSPITAL | Age: 52
End: 2022-01-07
Attending: OTOLARYNGOLOGY
Payer: COMMERCIAL

## 2022-01-07 DIAGNOSIS — Z01.818 PREOP TESTING: ICD-10-CM

## 2022-01-08 LAB — SARS-COV-2 RNA RESP QL NAA+PROBE: NOT DETECTED

## 2022-01-10 ENCOUNTER — ANESTHESIA EVENT (OUTPATIENT)
Dept: SURGERY | Facility: HOSPITAL | Age: 52
End: 2022-01-10
Payer: COMMERCIAL

## 2022-01-10 ENCOUNTER — ANESTHESIA (OUTPATIENT)
Dept: SURGERY | Facility: HOSPITAL | Age: 52
End: 2022-01-10
Payer: COMMERCIAL

## 2022-01-10 ENCOUNTER — HOSPITAL ENCOUNTER (OUTPATIENT)
Facility: HOSPITAL | Age: 52
Setting detail: HOSPITAL OUTPATIENT SURGERY
Discharge: HOME | End: 2022-01-10
Attending: OTOLARYNGOLOGY | Admitting: OTOLARYNGOLOGY
Payer: COMMERCIAL

## 2022-01-10 VITALS
BODY MASS INDEX: 29.66 KG/M2 | OXYGEN SATURATION: 97 % | HEIGHT: 67 IN | DIASTOLIC BLOOD PRESSURE: 69 MMHG | RESPIRATION RATE: 16 BRPM | TEMPERATURE: 98 F | WEIGHT: 189 LBS | SYSTOLIC BLOOD PRESSURE: 142 MMHG | HEART RATE: 76 BPM

## 2022-01-10 DIAGNOSIS — Z01.818 PREOP TESTING: Primary | ICD-10-CM

## 2022-01-10 DIAGNOSIS — J34.3 HYPERTROPHY OF NASAL TURBINATES: ICD-10-CM

## 2022-01-10 DIAGNOSIS — J34.2 DEVIATED NASAL SEPTUM: ICD-10-CM

## 2022-01-10 PROCEDURE — 09SL0ZZ REPOSITION NASAL TURBINATE, OPEN APPROACH: ICD-10-PCS | Performed by: OTOLARYNGOLOGY

## 2022-01-10 PROCEDURE — 30520 REPAIR OF NASAL SEPTUM: CPT | Performed by: OTOLARYNGOLOGY

## 2022-01-10 PROCEDURE — 30140 RESECT INFERIOR TURBINATE: CPT | Performed by: OTOLARYNGOLOGY

## 2022-01-10 PROCEDURE — 09SM0ZZ REPOSITION NASAL SEPTUM, OPEN APPROACH: ICD-10-PCS | Performed by: OTOLARYNGOLOGY

## 2022-01-10 RX ORDER — ACETAMINOPHEN 500 MG
1000 TABLET ORAL ONCE
Status: COMPLETED | OUTPATIENT
Start: 2022-01-10 | End: 2022-01-10

## 2022-01-10 RX ORDER — CEFAZOLIN SODIUM/WATER 2 G/20 ML
SYRINGE (ML) INTRAVENOUS AS NEEDED
Status: DISCONTINUED | OUTPATIENT
Start: 2022-01-10 | End: 2022-01-10 | Stop reason: SURG

## 2022-01-10 RX ORDER — HALOPERIDOL 5 MG/ML
0.25 INJECTION INTRAMUSCULAR ONCE AS NEEDED
Status: DISCONTINUED | OUTPATIENT
Start: 2022-01-10 | End: 2022-01-10

## 2022-01-10 RX ORDER — HYDROMORPHONE HYDROCHLORIDE 1 MG/ML
0.4 INJECTION, SOLUTION INTRAMUSCULAR; INTRAVENOUS; SUBCUTANEOUS EVERY 5 MIN PRN
Status: DISCONTINUED | OUTPATIENT
Start: 2022-01-10 | End: 2022-01-10

## 2022-01-10 RX ORDER — LIDOCAINE HYDROCHLORIDE AND EPINEPHRINE 10; 10 MG/ML; UG/ML
INJECTION, SOLUTION INFILTRATION; PERINEURAL AS NEEDED
Status: DISCONTINUED | OUTPATIENT
Start: 2022-01-10 | End: 2022-01-10 | Stop reason: HOSPADM

## 2022-01-10 RX ORDER — OXYCODONE HYDROCHLORIDE 5 MG/1
10 TABLET ORAL EVERY 4 HOURS PRN
Status: DISCONTINUED | OUTPATIENT
Start: 2022-01-10 | End: 2022-01-10

## 2022-01-10 RX ORDER — HYDROCODONE BITARTRATE AND ACETAMINOPHEN 5; 325 MG/1; MG/1
2 TABLET ORAL AS NEEDED
Status: DISCONTINUED | OUTPATIENT
Start: 2022-01-10 | End: 2022-01-10

## 2022-01-10 RX ORDER — SODIUM CHLORIDE, SODIUM LACTATE, POTASSIUM CHLORIDE, CALCIUM CHLORIDE 600; 310; 30; 20 MG/100ML; MG/100ML; MG/100ML; MG/100ML
INJECTION, SOLUTION INTRAVENOUS CONTINUOUS
Status: DISCONTINUED | OUTPATIENT
Start: 2022-01-10 | End: 2022-01-10

## 2022-01-10 RX ORDER — NALOXONE HYDROCHLORIDE 0.4 MG/ML
80 INJECTION, SOLUTION INTRAMUSCULAR; INTRAVENOUS; SUBCUTANEOUS AS NEEDED
Status: DISCONTINUED | OUTPATIENT
Start: 2022-01-10 | End: 2022-01-10

## 2022-01-10 RX ORDER — ONDANSETRON 2 MG/ML
INJECTION INTRAMUSCULAR; INTRAVENOUS AS NEEDED
Status: DISCONTINUED | OUTPATIENT
Start: 2022-01-10 | End: 2022-01-10 | Stop reason: SURG

## 2022-01-10 RX ORDER — MORPHINE SULFATE 10 MG/ML
6 INJECTION, SOLUTION INTRAMUSCULAR; INTRAVENOUS EVERY 10 MIN PRN
Status: DISCONTINUED | OUTPATIENT
Start: 2022-01-10 | End: 2022-01-10

## 2022-01-10 RX ORDER — DIAPER,BRIEF,INFANT-TODD,DISP
EACH MISCELLANEOUS AS NEEDED
Status: DISCONTINUED | OUTPATIENT
Start: 2022-01-10 | End: 2022-01-10 | Stop reason: HOSPADM

## 2022-01-10 RX ORDER — LIDOCAINE HYDROCHLORIDE 10 MG/ML
INJECTION, SOLUTION EPIDURAL; INFILTRATION; INTRACAUDAL; PERINEURAL AS NEEDED
Status: DISCONTINUED | OUTPATIENT
Start: 2022-01-10 | End: 2022-01-10 | Stop reason: SURG

## 2022-01-10 RX ORDER — DEXAMETHASONE SODIUM PHOSPHATE 4 MG/ML
VIAL (ML) INJECTION AS NEEDED
Status: DISCONTINUED | OUTPATIENT
Start: 2022-01-10 | End: 2022-01-10 | Stop reason: SURG

## 2022-01-10 RX ORDER — HYDROCODONE BITARTRATE AND ACETAMINOPHEN 7.5; 325 MG/1; MG/1
1 TABLET ORAL EVERY 6 HOURS PRN
Qty: 30 TABLET | Refills: 0 | Status: SHIPPED | OUTPATIENT
Start: 2022-01-10

## 2022-01-10 RX ORDER — ONDANSETRON 2 MG/ML
4 INJECTION INTRAMUSCULAR; INTRAVENOUS ONCE AS NEEDED
Status: DISCONTINUED | OUTPATIENT
Start: 2022-01-10 | End: 2022-01-10

## 2022-01-10 RX ORDER — PROCHLORPERAZINE EDISYLATE 5 MG/ML
5 INJECTION INTRAMUSCULAR; INTRAVENOUS ONCE AS NEEDED
Status: DISCONTINUED | OUTPATIENT
Start: 2022-01-10 | End: 2022-01-10

## 2022-01-10 RX ORDER — HYDROCODONE BITARTRATE AND ACETAMINOPHEN 5; 325 MG/1; MG/1
1 TABLET ORAL AS NEEDED
Status: DISCONTINUED | OUTPATIENT
Start: 2022-01-10 | End: 2022-01-10

## 2022-01-10 RX ORDER — ACETAMINOPHEN 325 MG/1
650 TABLET ORAL
Status: DISCONTINUED | OUTPATIENT
Start: 2022-01-10 | End: 2022-01-10

## 2022-01-10 RX ORDER — MORPHINE SULFATE 4 MG/ML
4 INJECTION, SOLUTION INTRAMUSCULAR; INTRAVENOUS EVERY 10 MIN PRN
Status: DISCONTINUED | OUTPATIENT
Start: 2022-01-10 | End: 2022-01-10

## 2022-01-10 RX ORDER — MORPHINE SULFATE 4 MG/ML
2 INJECTION, SOLUTION INTRAMUSCULAR; INTRAVENOUS EVERY 10 MIN PRN
Status: DISCONTINUED | OUTPATIENT
Start: 2022-01-10 | End: 2022-01-10

## 2022-01-10 RX ORDER — HYDROMORPHONE HYDROCHLORIDE 1 MG/ML
0.6 INJECTION, SOLUTION INTRAMUSCULAR; INTRAVENOUS; SUBCUTANEOUS EVERY 5 MIN PRN
Status: DISCONTINUED | OUTPATIENT
Start: 2022-01-10 | End: 2022-01-10

## 2022-01-10 RX ORDER — CEPHALEXIN 500 MG/1
500 CAPSULE ORAL EVERY 8 HOURS
Qty: 21 CAPSULE | Refills: 0 | Status: SHIPPED | OUTPATIENT
Start: 2022-01-10

## 2022-01-10 RX ORDER — SODIUM CHLORIDE 0.9 % (FLUSH) 0.9 %
10 SYRINGE (ML) INJECTION AS NEEDED
Status: DISCONTINUED | OUTPATIENT
Start: 2022-01-10 | End: 2022-01-10

## 2022-01-10 RX ORDER — HYDROMORPHONE HYDROCHLORIDE 1 MG/ML
0.8 INJECTION, SOLUTION INTRAMUSCULAR; INTRAVENOUS; SUBCUTANEOUS EVERY 2 HOUR PRN
Status: DISCONTINUED | OUTPATIENT
Start: 2022-01-10 | End: 2022-01-10

## 2022-01-10 RX ORDER — SODIUM CHLORIDE/ALOE VERA
GEL (GRAM) NASAL AS NEEDED
Status: DISCONTINUED | OUTPATIENT
Start: 2022-01-10 | End: 2022-01-10 | Stop reason: HOSPADM

## 2022-01-10 RX ORDER — HYDROMORPHONE HYDROCHLORIDE 1 MG/ML
0.4 INJECTION, SOLUTION INTRAMUSCULAR; INTRAVENOUS; SUBCUTANEOUS EVERY 2 HOUR PRN
Status: DISCONTINUED | OUTPATIENT
Start: 2022-01-10 | End: 2022-01-10

## 2022-01-10 RX ORDER — OXYCODONE HYDROCHLORIDE 5 MG/1
5 TABLET ORAL EVERY 4 HOURS PRN
Status: DISCONTINUED | OUTPATIENT
Start: 2022-01-10 | End: 2022-01-10

## 2022-01-10 RX ORDER — ONDANSETRON 4 MG/1
4 TABLET, ORALLY DISINTEGRATING ORAL EVERY 6 HOURS PRN
Status: DISCONTINUED | OUTPATIENT
Start: 2022-01-10 | End: 2022-01-10

## 2022-01-10 RX ORDER — ONDANSETRON 2 MG/ML
4 INJECTION INTRAMUSCULAR; INTRAVENOUS EVERY 6 HOURS PRN
Status: DISCONTINUED | OUTPATIENT
Start: 2022-01-10 | End: 2022-01-10

## 2022-01-10 RX ORDER — HYDROMORPHONE HYDROCHLORIDE 1 MG/ML
0.2 INJECTION, SOLUTION INTRAMUSCULAR; INTRAVENOUS; SUBCUTANEOUS EVERY 5 MIN PRN
Status: DISCONTINUED | OUTPATIENT
Start: 2022-01-10 | End: 2022-01-10

## 2022-01-10 RX ADMIN — SODIUM CHLORIDE, SODIUM LACTATE, POTASSIUM CHLORIDE, CALCIUM CHLORIDE: 600; 310; 30; 20 INJECTION, SOLUTION INTRAVENOUS at 11:50:00

## 2022-01-10 RX ADMIN — ONDANSETRON 4 MG: 2 INJECTION INTRAMUSCULAR; INTRAVENOUS at 12:00:00

## 2022-01-10 RX ADMIN — DEXAMETHASONE SODIUM PHOSPHATE 4 MG: 4 MG/ML VIAL (ML) INJECTION at 12:00:00

## 2022-01-10 RX ADMIN — LIDOCAINE HYDROCHLORIDE 50 MG: 10 INJECTION, SOLUTION EPIDURAL; INFILTRATION; INTRACAUDAL; PERINEURAL at 11:52:00

## 2022-01-10 RX ADMIN — CEFAZOLIN SODIUM/WATER 2 G: 2 G/20 ML SYRINGE (ML) INTRAVENOUS at 12:00:00

## 2022-01-10 RX ADMIN — SODIUM CHLORIDE, SODIUM LACTATE, POTASSIUM CHLORIDE, CALCIUM CHLORIDE: 600; 310; 30; 20 INJECTION, SOLUTION INTRAVENOUS at 12:28:00

## 2022-01-10 NOTE — ANESTHESIA POSTPROCEDURE EVALUATION
Patient: Nitish Shankar    Procedure Summary     Date: 01/10/22 Room / Location: [address] [place] [address] MAIN OR 02 / [address] [place] [address] MAIN OR    Anesthesia Start: 1150 Anesthesia Stop: 0214    Procedure: Septoplasty and submucous turbinate reduction (N/A Nose) Diagnosis:       Deviated nasal

## 2022-01-10 NOTE — INTERVAL H&P NOTE
Pre-op Diagnosis: Deviated nasal septum [J34.2]  Hypertrophy of nasal turbinates [J34.3]    The above referenced H&P was reviewed by Rajwinder Mortensen.  Pa Valdivia MD on 1/10/2022, the patient was examined and no significant changes have occurred in the patient's conditi

## 2022-01-10 NOTE — ANESTHESIA PROCEDURE NOTES
Airway  Date/Time: 1/10/2022 11:53 AM  Urgency: Elective      General Information and Staff    Patient location during procedure: OR  Anesthesiologist: Scott Maloney MD  Resident/CRNA: Dorcas Junior CRNA  Performed: CRNA     Indications and Patie

## 2022-01-10 NOTE — H&P
HISTORY OF PRESENT ILLNESS  She presents with a right-sided eyebrow lesion is been there for years but has increased dramatically in the past year. Jenniffer Pascual has concerns due to this quick growth over the past 12 months.  No drainage no pain but she does state disabled    Tobacco Use      Smoking status: Never Smoker      Smokeless tobacco: Never Used    Vaping Use      Vaping Use: Never used    Substance and Sexual Activity      Alcohol use: No      Drug use: Never    Other Topics      Concerns:        Caffeine Negative Easy bleeding and easy bruising.               PHYSICAL EXAM     Ht 5' 7\" (1.702 m)   Wt 191 lb (86.6 kg)   BMI 29.91 kg/m²           Constitutional Normal Overall appearance - Normal.   Psychiatric Normal Orientation - Oriented to time, place, p by mouth every morning before breakfast., Disp: 30 capsule, Rfl: 2  •  fluticasone-salmeterol (ADVAIR DISKUS) 250-50 MCG/DOSE Inhalation Aerosol Powder, Breath Activated, Inhale 1 puff into the lungs every 12 (twelve) hours. , Disp: 60 each, Rfl: 5  •  Levo

## 2022-01-10 NOTE — ANESTHESIA PREPROCEDURE EVALUATION
Anesthesia PreOp Note    HPI:     Steve Valdez is a 49year old female who presents for preoperative consultation requested by: Ronald Parra MD    Date of Surgery: 1/10/2022    Procedure(s):  Septoplasty and submucous turbinate reduction  Indication: Carlos Ortiz Sodium 10 MG Oral Tab, Take 1 tablet (10 mg total) by mouth nightly., Disp: 90 tablet, Rfl: 1  FLUTICASONE PROPIONATE 50 MCG/ACT Nasal Suspension, SHAKE LIQUID AND USE 2 SPRAYS IN EACH NOSTRIL DAILY, Disp: 48 g, Rfl: 0  traMADol HCl 50 MG Oral Tab, Take 50 Not Asked        Sleep Concern: Not Asked        Stress Concern: Not Asked        Weight Concern: Not Asked        Special Diet: Not Asked        Back Care: Not Asked        Exercise: No        Bike Helmet: Not Asked        Seat Belt: Not Asked        Self good    ECG reviewed    Neuro/Psych    (+)  neuromuscular disease,       GI/Hepatic/Renal - negative ROS     Endo/Other - negative ROS   Abdominal                Anesthesia Plan:   ASA:  2  Plan:   General  Airway:  ETT  Post-op Pain Management: IV analges

## 2022-01-10 NOTE — BRIEF OP NOTE
Pre-Operative Diagnosis: Deviated nasal septum [J34.2]  Hypertrophy of nasal turbinates [J34.3]     Post-Operative Diagnosis: Deviated nasal septum [L06. 2]Hypertrophy of nasal turbinates [J34.3]      Procedure Performed:   Septoplasty and submucous turbina

## 2022-01-11 ENCOUNTER — TELEPHONE (OUTPATIENT)
Dept: OTOLARYNGOLOGY | Facility: CLINIC | Age: 52
End: 2022-01-11

## 2022-01-11 NOTE — OPERATIVE REPORT
[facility]    PATIENT'S NAME: VIRIDIANA MANZANO   ATTENDING PHYSICIAN: Nicholas Bartlett MD   OPERATING PHYSICIAN: Candice Bartlett MD   PATIENT ACCOUNT#:   [id number]    LOCATION:  [address]  MEDICAL RECORD #:   [id number]       DATE OF BIRTH: 4-0 plain gut transeptal mattress stitch. The hemitransfixion incision on the right was then closed using 5-0 plain gut sutures.   The inferior turbinates were addressed using a 45 Reflex Coblation wand at a power setting of 6 for ablate and 3 for coagulat

## 2022-01-11 NOTE — TELEPHONE ENCOUNTER
Pt is post op day 1  septoplasty, SMR. Per  Pt pt is doing well no bleeding, advised pt no bending or heavy lifting for the next week and pt is not to blow nose until seen by Dr Kalyani Lion.  Advised pt she can start using OTC saline nasal spray 2 spray each nos

## 2022-01-18 ENCOUNTER — OFFICE VISIT (OUTPATIENT)
Dept: OTOLARYNGOLOGY | Facility: CLINIC | Age: 52
End: 2022-01-18
Payer: COMMERCIAL

## 2022-01-18 VITALS
SYSTOLIC BLOOD PRESSURE: 133 MMHG | HEIGHT: 67 IN | BODY MASS INDEX: 29.66 KG/M2 | TEMPERATURE: 97 F | WEIGHT: 189 LBS | DIASTOLIC BLOOD PRESSURE: 71 MMHG

## 2022-01-18 DIAGNOSIS — J34.2 DEVIATED NASAL SEPTUM: Primary | ICD-10-CM

## 2022-01-18 PROCEDURE — 3008F BODY MASS INDEX DOCD: CPT | Performed by: OTOLARYNGOLOGY

## 2022-01-18 PROCEDURE — 3075F SYST BP GE 130 - 139MM HG: CPT | Performed by: OTOLARYNGOLOGY

## 2022-01-18 PROCEDURE — 3078F DIAST BP <80 MM HG: CPT | Performed by: OTOLARYNGOLOGY

## 2022-01-18 PROCEDURE — 99024 POSTOP FOLLOW-UP VISIT: CPT | Performed by: OTOLARYNGOLOGY

## 2022-01-18 RX ORDER — PSEUDOEPHEDRINE HCL 120 MG/1
120 TABLET, FILM COATED, EXTENDED RELEASE ORAL EVERY 12 HOURS
COMMUNITY

## 2022-01-18 NOTE — PROGRESS NOTES
Zulema Hernandez is a 49year old female. Patient presents with:  Post-Op: Pt is here post op of [facility] , septoplasty done on 01/10/22 pt feels fine .        HISTORY OF PRESENT ILLNESS  She presents with a right-sided eyebrow lesion is been there for years but has from septoplasty and turbinate reduction. Doing very well no complaints or concerns already notes that she is breathing better.       Social History    Socioeconomic History      Marital status:       Number of children: 2    Occupational History OF [employer]    System Neg/Pos Details   Constitutional Negative Fatigue, fever and weight loss. ENMT Negative Drooling. Eyes Negative Blurred vision and vision changes. Respiratory Negative Dyspnea and wheezing.    Cardio Negative Chest pain, irregular Left: Normal. Septum -Normal  Turbinates - Right: Normal, Left: Normal.       Current Outpatient Medications:   •  Pseudoephedrine HCl ER (WAL-PHED 12 HOUR) 120 MG Oral Tablet 12 Hr, Take 120 mg by mouth every 12 (twelve) hours. , Disp: , Rfl:   •  cephalex While every attempt is made to correct errors during dictation discrepancies may still exist    Nicholas Mueller MD    1/18/2022    11:31 AM

## 2022-01-23 RX ORDER — PSEUDOEPHEDRINE HCL 120 MG/1
120 TABLET, FILM COATED, EXTENDED RELEASE ORAL EVERY 12 HOURS
Qty: 60 TABLET | Refills: 3 | Status: SHIPPED | OUTPATIENT
Start: 2022-01-23

## 2022-02-04 ENCOUNTER — TELEMEDICINE (OUTPATIENT)
Dept: FAMILY MEDICINE CLINIC | Facility: CLINIC | Age: 52
End: 2022-02-04

## 2022-02-04 DIAGNOSIS — J02.9 SORE THROAT: Primary | ICD-10-CM

## 2022-02-04 PROCEDURE — 99443 PHONE E/M BY PHYS 21-30 MIN: CPT | Performed by: FAMILY MEDICINE

## 2022-02-04 RX ORDER — AMOXICILLIN 500 MG/1
500 CAPSULE ORAL 2 TIMES DAILY
Qty: 20 CAPSULE | Refills: 0 | Status: SHIPPED | OUTPATIENT
Start: 2022-02-04 | End: 2022-02-14

## 2022-02-15 ENCOUNTER — NURSE ONLY (OUTPATIENT)
Dept: ALLERGY | Facility: CLINIC | Age: 52
End: 2022-02-15
Payer: COMMERCIAL

## 2022-02-15 ENCOUNTER — OFFICE VISIT (OUTPATIENT)
Dept: ALLERGY | Facility: CLINIC | Age: 52
End: 2022-02-15
Payer: COMMERCIAL

## 2022-02-15 VITALS
BODY MASS INDEX: 29.98 KG/M2 | WEIGHT: 191 LBS | RESPIRATION RATE: 18 BRPM | TEMPERATURE: 97 F | OXYGEN SATURATION: 97 % | HEART RATE: 60 BPM | DIASTOLIC BLOOD PRESSURE: 82 MMHG | HEIGHT: 67 IN | SYSTOLIC BLOOD PRESSURE: 127 MMHG

## 2022-02-15 DIAGNOSIS — J30.89 PERENNIAL ALLERGIC RHINITIS WITH SEASONAL VARIATION: Primary | ICD-10-CM

## 2022-02-15 DIAGNOSIS — J30.2 PERENNIAL ALLERGIC RHINITIS WITH SEASONAL VARIATION: Primary | ICD-10-CM

## 2022-02-15 DIAGNOSIS — Z23 FLU VACCINE NEED: ICD-10-CM

## 2022-02-15 DIAGNOSIS — J34.2 NASAL SEPTAL DEVIATION: ICD-10-CM

## 2022-02-15 DIAGNOSIS — J45.40 MODERATE PERSISTENT EXTRINSIC ASTHMA WITHOUT COMPLICATION: ICD-10-CM

## 2022-02-15 DIAGNOSIS — J30.89 ENVIRONMENTAL AND SEASONAL ALLERGIES: ICD-10-CM

## 2022-02-15 DIAGNOSIS — Z92.29 COVID-19 VACCINE SERIES COMPLETED: ICD-10-CM

## 2022-02-15 PROCEDURE — 95024 IQ TESTS W/ALLERGENIC XTRCS: CPT | Performed by: ALLERGY & IMMUNOLOGY

## 2022-02-15 PROCEDURE — 99244 OFF/OP CNSLTJ NEW/EST MOD 40: CPT | Performed by: ALLERGY & IMMUNOLOGY

## 2022-02-15 PROCEDURE — 3079F DIAST BP 80-89 MM HG: CPT | Performed by: ALLERGY & IMMUNOLOGY

## 2022-02-15 PROCEDURE — 3074F SYST BP LT 130 MM HG: CPT | Performed by: ALLERGY & IMMUNOLOGY

## 2022-02-15 PROCEDURE — 3008F BODY MASS INDEX DOCD: CPT | Performed by: ALLERGY & IMMUNOLOGY

## 2022-02-15 PROCEDURE — 95004 PERQ TESTS W/ALRGNC XTRCS: CPT | Performed by: ALLERGY & IMMUNOLOGY

## 2022-02-15 RX ORDER — AZELASTINE 1 MG/ML
2 SPRAY, METERED NASAL DAILY
Qty: 1 EACH | Refills: 0 | Status: SHIPPED | OUTPATIENT
Start: 2022-02-15

## 2022-02-15 RX ORDER — FLUTICASONE PROPIONATE 50 MCG
2 SPRAY, SUSPENSION (ML) NASAL DAILY
Qty: 1 EACH | Refills: 0 | Status: SHIPPED | OUTPATIENT
Start: 2022-02-15

## 2022-02-15 RX ORDER — AZELASTINE 1 MG/ML
2 SPRAY, METERED NASAL 2 TIMES DAILY
COMMUNITY
Start: 2022-01-14 | End: 2022-02-15

## 2022-02-15 NOTE — PATIENT INSTRUCTIONS
#1 AR  -yearround  symptoms predominantly nasal congestion. Patient status post nasal septal repair last month with Dr. Jennifer Kilgore  Currently using montelukast/Singulair and Sudafed. No current intranasal steroid spray or intranasal antihistamine spray  See above skin testing to screen for allergic triggers    Reviewed allergy avoidance measures and potential treatment option of immunotherapy if individual allergens are detected  Continue with montelukast, singular 10 mg once a day  Continue with pseudoephedrine 30 mg every 4-6 hours 120 mg every 12 hours  Trial of Flonase/fluticasone 2 sprays per nostril once a day  Add Astelin, azelastine 2 sprays per nostril twice a day if not improving  Consider nasal saline sinus rinses    #2 nasal septal deviation. Status post repair last month with Dr. Lewis Louis. #3 asthma  Reviewed signs and symptoms of persistent asthma including the rules of 2. Continue with Advair 1 puff twice a day. Albuterol 2 puffs every 4-6 hours if having active asthma symptoms. Flu vaccine up-to-date. Recommend Pneumovax 23    #4 COVID vaccine up-to-date x2 doses.   Recommend booster once eligible    #5 flu vaccine up-to-date

## 2022-02-20 RX ORDER — MONTELUKAST SODIUM 10 MG/1
10 TABLET ORAL NIGHTLY
Qty: 90 TABLET | Refills: 1 | Status: SHIPPED | OUTPATIENT
Start: 2022-02-20

## 2022-02-21 NOTE — TELEPHONE ENCOUNTER
Refill passed per [facility] protocol   Requested Prescriptions   Pending Prescriptions Disp Refills    MONTELUKAST 10 MG Oral Tab [Pharmacy Med Name: MONTELUKAST 10MG TABLETS] 90 tablet 1     Sig: TAKE 1 TABLET(10 MG) BY MOUTH EVERY NIGHT        Asthma & COPD Medication Protocol Passed - 2/20/2022  4:42 PM        Passed - Appointment in past 6 or next 3 months

## 2022-02-24 NOTE — PROGRESS NOTES
Aleah Ace is a 56year old female. Patient presents with:  Breast Lump    HPI:   Pt here for new lump in left breast. Noted it last week. No pain.      Current Outpatient Prescriptions on File Prior to Visit:  ibuprofen 600 MG Oral Tab Take 1 tablet (600 m Breath sounds clear and equal bilaterally.

## 2022-04-07 ENCOUNTER — OFFICE VISIT (OUTPATIENT)
Dept: FAMILY MEDICINE CLINIC | Facility: CLINIC | Age: 52
End: 2022-04-07
Payer: COMMERCIAL

## 2022-04-07 VITALS
HEIGHT: 67 IN | BODY MASS INDEX: 29.03 KG/M2 | WEIGHT: 185 LBS | DIASTOLIC BLOOD PRESSURE: 72 MMHG | SYSTOLIC BLOOD PRESSURE: 114 MMHG | HEART RATE: 60 BPM

## 2022-04-07 DIAGNOSIS — K14.8 TONGUE LESION: Primary | ICD-10-CM

## 2022-04-07 PROCEDURE — 99214 OFFICE O/P EST MOD 30 MIN: CPT | Performed by: NURSE PRACTITIONER

## 2022-04-07 PROCEDURE — 3074F SYST BP LT 130 MM HG: CPT | Performed by: NURSE PRACTITIONER

## 2022-04-07 PROCEDURE — 3008F BODY MASS INDEX DOCD: CPT | Performed by: NURSE PRACTITIONER

## 2022-04-07 PROCEDURE — 3078F DIAST BP <80 MM HG: CPT | Performed by: NURSE PRACTITIONER

## 2022-04-07 RX ORDER — TRAMADOL HYDROCHLORIDE 50 MG/1
50 TABLET ORAL DAILY PRN
COMMUNITY
Start: 2022-04-01

## 2022-05-25 ENCOUNTER — OFFICE VISIT (OUTPATIENT)
Dept: FAMILY MEDICINE CLINIC | Facility: CLINIC | Age: 52
End: 2022-05-25
Payer: MEDICARE

## 2022-05-25 VITALS
WEIGHT: 186 LBS | RESPIRATION RATE: 16 BRPM | HEART RATE: 57 BPM | SYSTOLIC BLOOD PRESSURE: 125 MMHG | HEIGHT: 67 IN | BODY MASS INDEX: 29.19 KG/M2 | DIASTOLIC BLOOD PRESSURE: 78 MMHG

## 2022-05-25 DIAGNOSIS — J45.20 MILD INTERMITTENT ASTHMA WITHOUT COMPLICATION: ICD-10-CM

## 2022-05-25 DIAGNOSIS — J30.9 ALLERGIC RHINITIS, UNSPECIFIED SEASONALITY, UNSPECIFIED TRIGGER: Primary | ICD-10-CM

## 2022-05-25 PROCEDURE — 99214 OFFICE O/P EST MOD 30 MIN: CPT | Performed by: FAMILY MEDICINE

## 2022-05-25 RX ORDER — MONTELUKAST SODIUM 10 MG/1
10 TABLET ORAL NIGHTLY
Qty: 90 TABLET | Refills: 0 | Status: SHIPPED | OUTPATIENT
Start: 2022-05-25

## 2022-05-25 RX ORDER — AZELASTINE 1 MG/ML
2 SPRAY, METERED NASAL DAILY
Qty: 1 EACH | Refills: 4 | Status: SHIPPED | OUTPATIENT
Start: 2022-05-25

## 2022-05-25 RX ORDER — PREDNISONE 20 MG/1
TABLET ORAL
Qty: 18 TABLET | Refills: 0 | Status: SHIPPED | OUTPATIENT
Start: 2022-05-25

## 2022-05-25 RX ORDER — PSEUDOEPHEDRINE HCL 120 MG/1
120 TABLET, FILM COATED, EXTENDED RELEASE ORAL EVERY 12 HOURS
Qty: 60 TABLET | Refills: 3 | Status: SHIPPED | OUTPATIENT
Start: 2022-05-25

## 2022-05-25 RX ORDER — FLUTICASONE PROPIONATE AND SALMETEROL 250; 50 UG/1; UG/1
1 POWDER RESPIRATORY (INHALATION) EVERY 12 HOURS SCHEDULED
Qty: 60 EACH | Refills: 5 | Status: SHIPPED | OUTPATIENT
Start: 2022-05-25

## 2022-06-25 NOTE — TELEPHONE ENCOUNTER
[facility] Emergency Department Provider Note                   PCP:                None Provider               Age: 26 y.o. Sex: female       ICD-10-CM    1. Pain, dental  K08.89    2. Dental caries  K02.9    3. Dental abscess  K04.7        DISPOSITION         New Prescriptions    AMOXICILLIN-CLAVULANATE (AUGMENTIN) 875-125 MG PER TABLET    Take 1 tablet by mouth 2 times daily for 7 days    NAPROXEN (NAPROSYN) 500 MG TABLET    Take 1 tablet by mouth 2 times daily as needed for Pain       No orders of the defined types were placed in this encounter. Jie Dior NP, APRN - CNP 9:30 AM      MDM  Number of Diagnoses or Management Options     As in HPI. Patient is alert and oriented ×3, hemodynamically stable, afebrile, and appears to be in no acute distress. Patient denies headache, blurred vision, dizziness, neck/back pain, difficulty swallowing, hoarseness, difficulty tolerating secretions. Denies difficulty tolerating PO intake. No throat pain/swelling, dyspnea, chest pain or tightness, nausea/vomiting/diarrhea, abdominal pain, generalized body aches; or any other complaint. Exam is reassuring. No trismus. Uvula midline. No drooling. Patient tolerates oral secretions. No hoarseness of voice. No sublingual swelling or erythema. No tonsillar swelling. No oropharyngeal exudates. Negative TMJ crepitus. No submandibular swelling. No malocclusion. No woody induration. No fluctuant dental abscess amenable to incision and in the ED. No indication of syed's angina, deep space infection, RPA/PTA. Will provide rx for appropriate antibiotic with coverage for dental infection. Patient is afebrile, hemodynamically stable and in no acute distress. Patient is not ill-appearing. All findings and plans were discussed with the patient. Patient verbalizes desire to be discharged home at this time. All questions answered.  Discussed with the patient that an unremarkable evaluation in the ED does not preclude noted the development or presence of a serious or life threatening condition. Patient was instructed to return immediately for any worsening or change in current symptoms, or if symptoms do not continue to improve. I instructed them to follow up with their primary care provider, own specialist, or medical provider that I am recommending for him within the next 2-3 days   The patient acknowledged understanding plan of care and affirmed approval.    Gera Chamberlain is a 26 y.o. female who presents to the Emergency Department with chief complaint of    Chief Complaint   Patient presents with    Dental Pain      HPI  Pleasant 55-year-old female presents to the ED with complaint of left mandibular dental pain and facial swelling. States symptoms began yesterday. States she has multiple known dental caries at this site. States she saw her dentist for this last month. States that she was on amoxicillin for this which she completed greater than 1 month ago. States that symptoms resolved completely after antibiotics, returned yesterday. Patient is alert and oriented ×4, hemodynamically stable, afebrile, and appears to be in no acute distress. Patient reports that he is hoping that we can assist with dental follow up. Patient denies headache, blurred vision, dizziness, neck/back pain, difficulty swallowing, hoarseness, difficulty tolerating secretions. Denies difficulty tolerating PO intake. Denies throat pain/swelling, dyspnea, chest pain or tightness, nausea/vomiting/diarrhea, abdominal pain, generalized body aches; or any other complaint. Review of Systems  Constitutional: Negative for activity change. Negative for fever. Negative for appetite change, chills, diaphoresis and unexpected weight change. HENT: As in history of present illness    Eyes: Negative. Respiratory: Negative. Cardiovascular: Negative. Musculoskeletal: Negative   Skin: Negative. Allergic/Immunologic: Negative.      Neurological: Negative. History reviewed. No pertinent past medical history. History reviewed. No pertinent surgical history. History reviewed. No pertinent family history. Social Connections:     Frequency of Communication with Friends and Family: Not on file    Frequency of Social Gatherings with Friends and Family: Not on file    Attends Rastafarian Services: Not on file    Active Member of Clubs or Organizations: Not on file    Attends Club or Organization Meetings: Not on file    Marital Status: Not on file        No Known Allergies     Vitals signs and nursing note reviewed. Patient Vitals for the past 4 hrs:   Temp Pulse Resp BP SpO2   06/25/22 0830 97.9 °F (36.6 °C) 96 18 123/79 100 %          Physical Exam   Constitutional: Oriented to person, place, and time. Appears well-developed and well-nourished. No distress. Tolerates PO intake   HENT:    Head: Normocephalic and atraumatic. No facial swelling. Mastoid processes are nontender, not erythematous, not tenderness; bilaterally. Right Ear: External ear normal. TM intact, pearly gray, positive light reflex, no exudates or erythema. Left Ear: External ear normal.  TM intact, pearly gray, positive light reflex, no exudates or erythema. Nose: Nose normal. Nares intact, no exudates noted. Mouth/Throat: Negative trismus. Uvula midline. No drooling. Patient tolerates oral secretions. No hoarseness of voice. No sublingual swelling or erythema. No tonsillar swelling. No oropharyngeal exudates. Negative TMJ crepitus. No submandibular swelling. No malocclusion. No woody induration. No fluctuant dental abscess amenable to incision and drainage. Eyes: Conjunctivae are normal. No discharge. Neck: Supple. Full active range of motion intact. No tenderness. No tracheal deviation. Cardiovascular: Normal rate, intact distal pulses. Brisk capillary refill intact, less than 2 seconds. Regular rhythm present.    Pulmonary/Chest: Lungs are equal bilaterally. No respiratory distress. Abdominal: Soft. There is no tenderness. Musculoskeletal: No obvious deformity, swelling, edema. Neurological: Alert and oriented to person, place, and time. No numbness/tingling. GCS= 15. Skin: Skin is warm and dry. Capillary refill takes less than 2 seconds. No abrasion, no lesion, no petechiae and no rash noted. Not diaphoretic. No cyanosis, erythema, or pallor. Psychiatric: Normal mood and affect. Behavior is normal. Judgment and thought content normal.    Nursing note and vitals reviewed. Procedures      Labs Reviewed - No data to display     No orders to display                          Voice dictation software was used during the making of this note. This software is not perfect and grammatical and other typographical errors may be present. This note has not been completely proofread for errors.      SORAYA Garza - JUANIS  06/25/22 9001

## 2022-07-08 ENCOUNTER — OFFICE VISIT (OUTPATIENT)
Dept: GASTROENTEROLOGY | Facility: CLINIC | Age: 52
End: 2022-07-08
Payer: MEDICARE

## 2022-07-08 ENCOUNTER — TELEPHONE (OUTPATIENT)
Dept: GASTROENTEROLOGY | Facility: CLINIC | Age: 52
End: 2022-07-08

## 2022-07-08 VITALS
SYSTOLIC BLOOD PRESSURE: 130 MMHG | DIASTOLIC BLOOD PRESSURE: 84 MMHG | HEART RATE: 56 BPM | WEIGHT: 188.63 LBS | BODY MASS INDEX: 29.61 KG/M2 | HEIGHT: 67 IN

## 2022-07-08 DIAGNOSIS — R10.13 DYSPEPSIA: Primary | ICD-10-CM

## 2022-07-08 DIAGNOSIS — Z12.11 ENCOUNTER FOR SCREENING COLONOSCOPY: ICD-10-CM

## 2022-07-08 DIAGNOSIS — Z12.11 SCREENING FOR COLON CANCER: Primary | ICD-10-CM

## 2022-07-08 PROCEDURE — 99203 OFFICE O/P NEW LOW 30 MIN: CPT | Performed by: INTERNAL MEDICINE

## 2022-07-08 RX ORDER — POLYETHYLENE GLYCOL 3350, SODIUM SULFATE ANHYDROUS, SODIUM BICARBONATE, SODIUM CHLORIDE, POTASSIUM CHLORIDE 236; 22.74; 6.74; 5.86; 2.97 G/4L; G/4L; G/4L; G/4L; G/4L
4 POWDER, FOR SOLUTION ORAL ONCE
Qty: 1 EACH | Refills: 0 | Status: SHIPPED | OUTPATIENT
Start: 2022-07-08 | End: 2022-07-08

## 2022-07-08 NOTE — TELEPHONE ENCOUNTER
Scheduled for:  Colonoscopy 39019  Provider Name:  Renae Huizar  Date: 12/14/22   Location: [facility]  Sedation: MAC   Time:  1:45 pm, (pt is aware that [facility] will call the day before to confirm arrival time)  Prep:  Golytely  Meds/Allergies Reconciled?:  Physician reviewed  Diagnosis with codes:  Colon screening Z12.11  Was patient informed to call insurance with codes (Y/N): YES  Referral sent?:  YES  [facility] or [facility] notified?:  Electronic case request was sent to [facility] via [employer]. Medication Orders: N/A  Misc Orders: Patient was informed that they will need a COVID 19 test prior to their procedure. Patient verbally understood & will await a phone call from [facility] to schedule. Further instructions given by staff:  I discussed the prep instructions with the patient which she verbally understood. Provided patient with prep instructions.

## 2022-10-06 ENCOUNTER — TELEPHONE (OUTPATIENT)
Dept: GASTROENTEROLOGY | Facility: CLINIC | Age: 52
End: 2022-10-06

## 2022-10-06 DIAGNOSIS — Z12.11 COLON CANCER SCREENING: Primary | ICD-10-CM

## 2022-10-06 NOTE — TELEPHONE ENCOUNTER
CBLM in regards to canceling & rescheduling pt's procedure. Provider is unavailable on that date. Please transfer to Abisai Reed at ext [phone] for rescheduling.

## 2022-11-17 ENCOUNTER — HOSPITAL ENCOUNTER (OUTPATIENT)
Dept: GENERAL RADIOLOGY | Age: 52
Discharge: HOME | End: 2022-11-17
Attending: NURSE PRACTITIONER
Payer: MEDICAID

## 2022-11-17 ENCOUNTER — OFFICE VISIT (OUTPATIENT)
Dept: FAMILY MEDICINE CLINIC | Facility: CLINIC | Age: 52
End: 2022-11-17
Payer: MEDICARE

## 2022-11-17 VITALS
HEART RATE: 60 BPM | DIASTOLIC BLOOD PRESSURE: 68 MMHG | HEIGHT: 67 IN | BODY MASS INDEX: 28.88 KG/M2 | TEMPERATURE: 98 F | SYSTOLIC BLOOD PRESSURE: 119 MMHG | WEIGHT: 184 LBS

## 2022-11-17 DIAGNOSIS — M25.561 ACUTE PAIN OF RIGHT KNEE: ICD-10-CM

## 2022-11-17 DIAGNOSIS — M25.561 ACUTE PAIN OF RIGHT KNEE: Primary | ICD-10-CM

## 2022-11-17 PROCEDURE — 99214 OFFICE O/P EST MOD 30 MIN: CPT | Performed by: NURSE PRACTITIONER

## 2022-11-17 PROCEDURE — 73562 X-RAY EXAM OF KNEE 3: CPT | Performed by: NURSE PRACTITIONER

## 2022-11-17 PROCEDURE — 3078F DIAST BP <80 MM HG: CPT | Performed by: NURSE PRACTITIONER

## 2022-11-17 PROCEDURE — 3008F BODY MASS INDEX DOCD: CPT | Performed by: NURSE PRACTITIONER

## 2022-11-17 PROCEDURE — 3074F SYST BP LT 130 MM HG: CPT | Performed by: NURSE PRACTITIONER

## 2022-11-17 RX ORDER — NAPROXEN 500 MG/1
500 TABLET ORAL 2 TIMES DAILY WITH MEALS
Qty: 60 TABLET | Refills: 0 | Status: SHIPPED | OUTPATIENT
Start: 2022-11-17

## 2022-11-17 NOTE — ASSESSMENT & PLAN NOTE
Right knee x-ray. Naproxen 500 mg 1 p.o. twice daily with food as needed  Ice 20 min 4-6 times per day  Do not use heat on injury  Do not apply ice directly on skin, make certain a thin cloth is between skin and ice pack    Please call if symptoms are worsening or not improving after 2 weeks.

## 2022-12-06 NOTE — TELEPHONE ENCOUNTER
This is the third attempt to reach this pt in regards to rescheduling with NO success rescheduling using the  Christine Payton in regards to 12/14/22 procedure. Left detailed message. Procedure canceled in Epic and in Case tabs. Message sent via [employer]. TE closed.

## 2022-12-20 ENCOUNTER — TELEPHONE (OUTPATIENT)
Dept: FAMILY MEDICINE CLINIC | Facility: CLINIC | Age: 52
End: 2022-12-20

## 2023-02-07 ENCOUNTER — OFFICE VISIT (OUTPATIENT)
Dept: FAMILY MEDICINE CLINIC | Facility: CLINIC | Age: 53
End: 2023-02-07

## 2023-02-07 VITALS
DIASTOLIC BLOOD PRESSURE: 78 MMHG | WEIGHT: 185.81 LBS | HEIGHT: 67 IN | HEART RATE: 58 BPM | SYSTOLIC BLOOD PRESSURE: 134 MMHG | BODY MASS INDEX: 29.16 KG/M2

## 2023-02-07 DIAGNOSIS — E55.9 VITAMIN D DEFICIENCY: ICD-10-CM

## 2023-02-07 DIAGNOSIS — Z13.220 SCREENING, LIPID: ICD-10-CM

## 2023-02-07 DIAGNOSIS — Z00.00 ROUTINE MEDICAL EXAM: Primary | ICD-10-CM

## 2023-02-07 DIAGNOSIS — Z12.31 SCREENING MAMMOGRAM FOR BREAST CANCER: ICD-10-CM

## 2023-02-07 DIAGNOSIS — Z00.00 ENCOUNTER FOR ANNUAL HEALTH EXAMINATION: ICD-10-CM

## 2023-02-07 DIAGNOSIS — M54.16 LUMBAR RADICULOPATHY: ICD-10-CM

## 2023-02-07 DIAGNOSIS — Z12.11 SCREEN FOR COLON CANCER: ICD-10-CM

## 2023-02-07 DIAGNOSIS — J45.20 MILD INTERMITTENT ASTHMA WITHOUT COMPLICATION: ICD-10-CM

## 2023-02-07 DIAGNOSIS — J30.9 ALLERGIC RHINITIS, UNSPECIFIED SEASONALITY, UNSPECIFIED TRIGGER: ICD-10-CM

## 2023-02-07 PROBLEM — M25.561 ACUTE PAIN OF RIGHT KNEE: Status: RESOLVED | Noted: 2022-11-17 | Resolved: 2023-02-07

## 2023-02-07 PROCEDURE — 3008F BODY MASS INDEX DOCD: CPT | Performed by: FAMILY MEDICINE

## 2023-02-07 PROCEDURE — 3075F SYST BP GE 130 - 139MM HG: CPT | Performed by: FAMILY MEDICINE

## 2023-02-07 PROCEDURE — G0438 PPPS, INITIAL VISIT: HCPCS | Performed by: FAMILY MEDICINE

## 2023-02-07 PROCEDURE — 96160 PT-FOCUSED HLTH RISK ASSMT: CPT | Performed by: FAMILY MEDICINE

## 2023-02-07 PROCEDURE — 3078F DIAST BP <80 MM HG: CPT | Performed by: FAMILY MEDICINE

## 2023-02-07 PROCEDURE — 99396 PREV VISIT EST AGE 40-64: CPT | Performed by: FAMILY MEDICINE

## 2023-02-07 RX ORDER — OMEPRAZOLE 20 MG/1
20 CAPSULE, DELAYED RELEASE ORAL
Qty: 30 CAPSULE | Refills: 5 | Status: SHIPPED | OUTPATIENT
Start: 2023-02-07 | End: 2024-02-07

## 2023-02-07 RX ORDER — FLUTICASONE PROPIONATE AND SALMETEROL 500; 50 UG/1; UG/1
1 POWDER RESPIRATORY (INHALATION) 2 TIMES DAILY
Qty: 60 EACH | Refills: 11 | Status: SHIPPED | OUTPATIENT
Start: 2023-02-07 | End: 2023-03-09

## 2023-02-07 RX ORDER — ALBUTEROL SULFATE 90 UG/1
2 AEROSOL, METERED RESPIRATORY (INHALATION) EVERY 6 HOURS PRN
Qty: 18 G | Refills: 1 | Status: SHIPPED | OUTPATIENT
Start: 2023-02-07

## 2023-02-07 RX ORDER — MONTELUKAST SODIUM 10 MG/1
10 TABLET ORAL NIGHTLY
Qty: 90 TABLET | Refills: 4 | Status: SHIPPED | OUTPATIENT
Start: 2023-02-07

## 2023-02-07 RX ORDER — LEVOCETIRIZINE DIHYDROCHLORIDE 5 MG/1
5 TABLET, FILM COATED ORAL EVERY EVENING
Qty: 90 TABLET | Refills: 2 | Status: SHIPPED | OUTPATIENT
Start: 2023-02-07

## 2023-03-11 ENCOUNTER — HOSPITAL ENCOUNTER (OUTPATIENT)
Dept: MAMMOGRAPHY | Facility: HOSPITAL | Age: 53
Discharge: HOME | End: 2023-03-11
Attending: FAMILY MEDICINE
Payer: MEDICAID

## 2023-03-11 DIAGNOSIS — Z12.31 SCREENING MAMMOGRAM FOR BREAST CANCER: ICD-10-CM

## 2023-03-11 PROCEDURE — 77067 SCR MAMMO BI INCL CAD: CPT | Performed by: FAMILY MEDICINE

## 2023-03-11 PROCEDURE — 77063 BREAST TOMOSYNTHESIS BI: CPT | Performed by: FAMILY MEDICINE

## 2023-04-11 ENCOUNTER — TELEPHONE (OUTPATIENT)
Facility: CLINIC | Age: 53
End: 2023-04-11

## 2023-04-24 ENCOUNTER — TELEPHONE (OUTPATIENT)
Facility: CLINIC | Age: 53
End: 2023-04-24

## 2023-04-24 ENCOUNTER — NURSE TRIAGE (OUTPATIENT)
Dept: FAMILY MEDICINE CLINIC | Facility: CLINIC | Age: 53
End: 2023-04-24

## 2023-04-25 ENCOUNTER — OFFICE VISIT (OUTPATIENT)
Dept: FAMILY MEDICINE CLINIC | Facility: CLINIC | Age: 53
End: 2023-04-25

## 2023-04-25 VITALS
TEMPERATURE: 98 F | DIASTOLIC BLOOD PRESSURE: 72 MMHG | SYSTOLIC BLOOD PRESSURE: 114 MMHG | BODY MASS INDEX: 29.82 KG/M2 | HEART RATE: 55 BPM | HEIGHT: 67 IN | WEIGHT: 190 LBS

## 2023-04-25 DIAGNOSIS — N89.8 VAGINAL ITCHING: Primary | ICD-10-CM

## 2023-04-25 PROCEDURE — 3074F SYST BP LT 130 MM HG: CPT | Performed by: FAMILY MEDICINE

## 2023-04-25 PROCEDURE — 3078F DIAST BP <80 MM HG: CPT | Performed by: FAMILY MEDICINE

## 2023-04-25 PROCEDURE — 3008F BODY MASS INDEX DOCD: CPT | Performed by: FAMILY MEDICINE

## 2023-04-25 PROCEDURE — 99213 OFFICE O/P EST LOW 20 MIN: CPT | Performed by: FAMILY MEDICINE

## 2023-04-25 RX ORDER — METRONIDAZOLE 500 MG/1
500 TABLET ORAL 2 TIMES DAILY
Qty: 14 TABLET | Refills: 0 | Status: SHIPPED | OUTPATIENT
Start: 2023-04-25 | End: 2023-05-02

## 2023-04-25 NOTE — TELEPHONE ENCOUNTER
I spoke to the pt's daughter Kelly Lei    I let her know I placed the bowel prep instructions on MyChart in [place] & [place]

## 2023-04-27 ENCOUNTER — SURGERY CENTER DOCUMENTATION (OUTPATIENT)
Dept: SURGERY | Age: 53
End: 2023-04-27

## 2023-05-05 ENCOUNTER — MED REC SCAN ONLY (OUTPATIENT)
Facility: CLINIC | Age: 53
End: 2023-05-05

## 2023-06-14 ENCOUNTER — NURSE TRIAGE (OUTPATIENT)
Dept: FAMILY MEDICINE CLINIC | Facility: CLINIC | Age: 53
End: 2023-06-14

## 2023-06-15 ENCOUNTER — OFFICE VISIT (OUTPATIENT)
Dept: FAMILY MEDICINE CLINIC | Facility: CLINIC | Age: 53
End: 2023-06-15

## 2023-06-15 VITALS
SYSTOLIC BLOOD PRESSURE: 130 MMHG | WEIGHT: 188 LBS | HEART RATE: 60 BPM | BODY MASS INDEX: 29.51 KG/M2 | HEIGHT: 67 IN | DIASTOLIC BLOOD PRESSURE: 83 MMHG | TEMPERATURE: 98 F

## 2023-06-15 DIAGNOSIS — M25.561 CHRONIC PAIN OF RIGHT KNEE: Primary | ICD-10-CM

## 2023-06-15 DIAGNOSIS — G89.29 CHRONIC PAIN OF RIGHT KNEE: Primary | ICD-10-CM

## 2023-06-15 DIAGNOSIS — M25.561 MEDIAL KNEE PAIN, RIGHT: ICD-10-CM

## 2023-06-15 PROCEDURE — 3075F SYST BP GE 130 - 139MM HG: CPT | Performed by: FAMILY MEDICINE

## 2023-06-15 PROCEDURE — 99214 OFFICE O/P EST MOD 30 MIN: CPT | Performed by: FAMILY MEDICINE

## 2023-06-15 PROCEDURE — 3008F BODY MASS INDEX DOCD: CPT | Performed by: FAMILY MEDICINE

## 2023-06-15 PROCEDURE — 3079F DIAST BP 80-89 MM HG: CPT | Performed by: FAMILY MEDICINE

## 2023-06-18 ENCOUNTER — APPOINTMENT (OUTPATIENT)
Dept: GENERAL RADIOLOGY | Facility: HOSPITAL | Age: 53
End: 2023-06-18
Attending: EMERGENCY MEDICINE
Payer: MEDICARE

## 2023-06-18 ENCOUNTER — HOSPITAL ENCOUNTER (EMERGENCY)
Facility: HOSPITAL | Age: 53
Discharge: HOME | End: 2023-06-18
Attending: EMERGENCY MEDICINE
Payer: MEDICARE

## 2023-06-18 VITALS
BODY MASS INDEX: 29.03 KG/M2 | HEIGHT: 67 IN | WEIGHT: 185 LBS | HEART RATE: 55 BPM | RESPIRATION RATE: 18 BRPM | OXYGEN SATURATION: 98 % | TEMPERATURE: 98 F | SYSTOLIC BLOOD PRESSURE: 126 MMHG | DIASTOLIC BLOOD PRESSURE: 74 MMHG

## 2023-06-18 DIAGNOSIS — R06.02 SHORTNESS OF BREATH: Primary | ICD-10-CM

## 2023-06-18 LAB
ANION GAP SERPL CALC-SCNC: 5 MMOL/L (ref 0–18)
BASOPHILS # BLD AUTO: 0.03 X10(3) UL (ref 0–0.2)
BASOPHILS NFR BLD AUTO: 0.6 %
BUN BLD-MCNC: 10 MG/DL (ref 7–18)
BUN/CREAT SERPL: 17.2 (ref 10–20)
CALCIUM BLD-MCNC: 8.7 MG/DL (ref 8.5–10.1)
CHLORIDE SERPL-SCNC: 111 MMOL/L (ref 98–112)
CO2 SERPL-SCNC: 26 MMOL/L (ref 21–32)
CREAT BLD-MCNC: 0.58 MG/DL
DEPRECATED RDW RBC AUTO: 44.7 FL (ref 35.1–46.3)
EOSINOPHIL # BLD AUTO: 0.58 X10(3) UL (ref 0–0.7)
EOSINOPHIL NFR BLD AUTO: 10.9 %
ERYTHROCYTE [DISTWIDTH] IN BLOOD BY AUTOMATED COUNT: 12.8 % (ref 11–15)
GFR SERPLBLD BASED ON 1.73 SQ M-ARVRAT: 109 ML/MIN/1.73M2 (ref 60–?)
GLUCOSE BLD-MCNC: 89 MG/DL (ref 70–99)
GLUCOSE BLDC GLUCOMTR-MCNC: 100 MG/DL (ref 70–99)
HCT VFR BLD AUTO: 44.7 %
HGB BLD-MCNC: 14.8 G/DL
IMM GRANULOCYTES # BLD AUTO: 0.01 X10(3) UL (ref 0–1)
IMM GRANULOCYTES NFR BLD: 0.2 %
LYMPHOCYTES # BLD AUTO: 2.66 X10(3) UL (ref 1–4)
LYMPHOCYTES NFR BLD AUTO: 50.2 %
MCH RBC QN AUTO: 31.4 PG (ref 26–34)
MCHC RBC AUTO-ENTMCNC: 33.1 G/DL (ref 31–37)
MCV RBC AUTO: 94.9 FL
MONOCYTES # BLD AUTO: 0.37 X10(3) UL (ref 0.1–1)
MONOCYTES NFR BLD AUTO: 7 %
NEUTROPHILS # BLD AUTO: 1.65 X10 (3) UL (ref 1.5–7.7)
NEUTROPHILS # BLD AUTO: 1.65 X10(3) UL (ref 1.5–7.7)
NEUTROPHILS NFR BLD AUTO: 31.1 %
OSMOLALITY SERPL CALC.SUM OF ELEC: 293 MOSM/KG (ref 275–295)
PLATELET # BLD AUTO: 216 10(3)UL (ref 150–450)
POTASSIUM SERPL-SCNC: 3.8 MMOL/L (ref 3.5–5.1)
RBC # BLD AUTO: 4.71 X10(6)UL
SODIUM SERPL-SCNC: 142 MMOL/L (ref 136–145)
TROPONIN I HIGH SENSITIVITY: 5 NG/L
WBC # BLD AUTO: 5.3 X10(3) UL (ref 4–11)

## 2023-06-18 PROCEDURE — 93010 ELECTROCARDIOGRAM REPORT: CPT

## 2023-06-18 PROCEDURE — 99284 EMERGENCY DEPT VISIT MOD MDM: CPT

## 2023-06-18 PROCEDURE — 85025 COMPLETE CBC W/AUTO DIFF WBC: CPT | Performed by: EMERGENCY MEDICINE

## 2023-06-18 PROCEDURE — 36415 COLL VENOUS BLD VENIPUNCTURE: CPT

## 2023-06-18 PROCEDURE — 80048 BASIC METABOLIC PNL TOTAL CA: CPT | Performed by: EMERGENCY MEDICINE

## 2023-06-18 PROCEDURE — 82962 GLUCOSE BLOOD TEST: CPT

## 2023-06-18 PROCEDURE — 99285 EMERGENCY DEPT VISIT HI MDM: CPT

## 2023-06-18 PROCEDURE — 84484 ASSAY OF TROPONIN QUANT: CPT | Performed by: EMERGENCY MEDICINE

## 2023-06-18 PROCEDURE — 71045 X-RAY EXAM CHEST 1 VIEW: CPT | Performed by: EMERGENCY MEDICINE

## 2023-06-18 PROCEDURE — 93005 ELECTROCARDIOGRAM TRACING: CPT

## 2023-06-18 NOTE — ED INITIAL ASSESSMENT (HPI)
Pt here via EMS A&O x 4 w/ c/o: Shortness of breath & dizziness. Pt w/ hx of asthma, took rx inhalers at home w/ out relief. Pt in NAD, speaking full, complete sentences w/ out issues. Pt denies cough, fevers/chills, chest pain.

## 2023-06-19 LAB
ATRIAL RATE: 60 BPM
P AXIS: 34 DEGREES
P-R INTERVAL: 156 MS
Q-T INTERVAL: 430 MS
QRS DURATION: 90 MS
QTC CALCULATION (BEZET): 430 MS
R AXIS: 57 DEGREES
T AXIS: 75 DEGREES
VENTRICULAR RATE: 60 BPM

## 2023-07-28 ENCOUNTER — HOSPITAL ENCOUNTER (OUTPATIENT)
Age: 53
Discharge: HOME | End: 2023-07-28
Payer: MEDICARE

## 2023-07-28 ENCOUNTER — APPOINTMENT (OUTPATIENT)
Dept: GENERAL RADIOLOGY | Age: 53
End: 2023-07-28
Attending: NURSE PRACTITIONER
Payer: MEDICARE

## 2023-07-28 ENCOUNTER — NURSE TRIAGE (OUTPATIENT)
Dept: FAMILY MEDICINE CLINIC | Facility: CLINIC | Age: 53
End: 2023-07-28

## 2023-07-28 VITALS
RESPIRATION RATE: 16 BRPM | DIASTOLIC BLOOD PRESSURE: 60 MMHG | HEART RATE: 58 BPM | TEMPERATURE: 98 F | SYSTOLIC BLOOD PRESSURE: 146 MMHG | OXYGEN SATURATION: 99 %

## 2023-07-28 DIAGNOSIS — R07.9 CHEST PAIN OF UNCERTAIN ETIOLOGY: Primary | ICD-10-CM

## 2023-07-28 DIAGNOSIS — J18.9 COMMUNITY ACQUIRED PNEUMONIA OF LEFT LOWER LOBE OF LUNG: ICD-10-CM

## 2023-07-28 LAB
ATRIAL RATE: 59 BPM
DDIMER WHOLE BLOOD: <200 NG/ML DDU (ref ?–400)
P AXIS: 36 DEGREES
P-R INTERVAL: 152 MS
Q-T INTERVAL: 430 MS
QRS DURATION: 90 MS
QTC CALCULATION (BEZET): 425 MS
R AXIS: 59 DEGREES
S PYO AG THROAT QL: NEGATIVE
SARS-COV-2 RNA RESP QL NAA+PROBE: NOT DETECTED
T AXIS: 63 DEGREES
TROPONIN I BLD-MCNC: <0.02 NG/ML
VENTRICULAR RATE: 59 BPM

## 2023-07-28 PROCEDURE — 87880 STREP A ASSAY W/OPTIC: CPT | Performed by: NURSE PRACTITIONER

## 2023-07-28 PROCEDURE — 36415 COLL VENOUS BLD VENIPUNCTURE: CPT | Performed by: NURSE PRACTITIONER

## 2023-07-28 PROCEDURE — 84484 ASSAY OF TROPONIN QUANT: CPT | Performed by: NURSE PRACTITIONER

## 2023-07-28 PROCEDURE — 71046 X-RAY EXAM CHEST 2 VIEWS: CPT | Performed by: NURSE PRACTITIONER

## 2023-07-28 PROCEDURE — 93000 ELECTROCARDIOGRAM COMPLETE: CPT | Performed by: NURSE PRACTITIONER

## 2023-07-28 PROCEDURE — U0002 COVID-19 LAB TEST NON-CDC: HCPCS | Performed by: NURSE PRACTITIONER

## 2023-07-28 PROCEDURE — 99214 OFFICE O/P EST MOD 30 MIN: CPT | Performed by: NURSE PRACTITIONER

## 2023-07-28 RX ORDER — PREDNISONE 20 MG/1
40 TABLET ORAL DAILY
Qty: 10 TABLET | Refills: 0 | Status: SHIPPED | OUTPATIENT
Start: 2023-07-28 | End: 2023-08-02

## 2023-07-28 RX ORDER — DOXYCYCLINE HYCLATE 100 MG/1
100 CAPSULE ORAL 2 TIMES DAILY
Qty: 14 CAPSULE | Refills: 0 | Status: SHIPPED | OUTPATIENT
Start: 2023-07-28 | End: 2023-08-04

## 2023-07-28 NOTE — TELEPHONE ENCOUNTER
Please reply to pool: EM RN TRIAGE  Action Requested: Summary for Provider     []  Critical Lab, Recommendations Needed  [] Need Additional Advice  [x]   FYI    []   Need Orders  [] Need Medications Sent to Pharmacy  []  Other     SUMMARY: Patient contacts clinic reporting cough x 3 days with increased phlegm production. History of asthma. Cough is painful, difficulty catching her breath after coughing. Some head congestion. Denies fever, body aches or chills. No acute visits to offer today. Advised IC evaluation of symptoms. Patient requests office visit next week, acute visit booked 7/31. Advised to report to IC prior to that for any symptom progression. She verbalized understanding and compliance.     Reason for call: Acute  Onset: Data Unavailable                       Reason for Disposition   Increase in amount of sputum    Protocols used: Cough - Chronic-A-OH

## 2023-07-28 NOTE — DISCHARGE INSTRUCTIONS
You are negative for COVID-19. All your blood work is reassuring. EKG within normal limits. Chest x-ray does show pneumonia to left lower lobe. We will treat with antibiotics and steroids. Please take antibiotics twice a day for 7 days. Prednisone, 2 tabs daily for 5 days. I recommend taking medication at same time each day with little bit of food and water. You may sleep somewhat elevated and upright, sleep with humidifier. Drink plenty of water and stay well-hydrated. Rest and conserve your energy. If you have any new or worsening chest pain, dizziness, lightheadedness, palpitations, shortness of breath, please go to emergency room.

## 2023-07-28 NOTE — ED INITIAL ASSESSMENT (HPI)
Information obtained through [employer] [id number], audio :    Pt with mid sternal chest pain x3 days. + cough/sob/congestion. Pt states pain is present without cough. Denies fever/chills/body aches/sore throat. Able to speak complete sentences. + chest pain at this time, rates pain 4/10.

## 2023-10-10 ENCOUNTER — TELEPHONE (OUTPATIENT)
Dept: FAMILY MEDICINE CLINIC | Facility: CLINIC | Age: 53
End: 2023-10-10

## 2023-10-10 NOTE — TELEPHONE ENCOUNTER
Pt has upcoming px with Dr. Wei Soriano on 10/26/23. Pt would like for blood work to be sent to labs so she can complete before appt. Pt states she would like to do blood work this weekend. Please advise.

## 2023-10-11 NOTE — TELEPHONE ENCOUNTER
Left message to call back regarding lab orders. There are active blood work orders from 2/7/23. Needs to be done fasting.

## 2023-10-14 ENCOUNTER — LAB ENCOUNTER (OUTPATIENT)
Dept: LAB | Age: 53
End: 2023-10-14
Attending: FAMILY MEDICINE
Payer: MEDICARE

## 2023-10-14 DIAGNOSIS — Z13.220 SCREENING, LIPID: ICD-10-CM

## 2023-10-14 DIAGNOSIS — E55.9 VITAMIN D DEFICIENCY: ICD-10-CM

## 2023-10-14 DIAGNOSIS — J45.20 MILD INTERMITTENT ASTHMA WITHOUT COMPLICATION: ICD-10-CM

## 2023-10-14 LAB
ALBUMIN SERPL-MCNC: 3.6 G/DL (ref 3.4–5)
ALBUMIN/GLOB SERPL: 0.9 {RATIO} (ref 1–2)
ALP LIVER SERPL-CCNC: 75 U/L
ALT SERPL-CCNC: 27 U/L
ANION GAP SERPL CALC-SCNC: 7 MMOL/L (ref 0–18)
AST SERPL-CCNC: 15 U/L (ref 15–37)
BASOPHILS # BLD AUTO: 0.05 X10(3) UL (ref 0–0.2)
BASOPHILS NFR BLD AUTO: 0.9 %
BILIRUB SERPL-MCNC: 0.5 MG/DL (ref 0.1–2)
BUN BLD-MCNC: 12 MG/DL (ref 7–18)
BUN/CREAT SERPL: 19.4 (ref 10–20)
CALCIUM BLD-MCNC: 9 MG/DL (ref 8.5–10.1)
CHLORIDE SERPL-SCNC: 109 MMOL/L (ref 98–112)
CHOLEST SERPL-MCNC: 146 MG/DL (ref ?–200)
CO2 SERPL-SCNC: 26 MMOL/L (ref 21–32)
CREAT BLD-MCNC: 0.62 MG/DL
DEPRECATED RDW RBC AUTO: 47.1 FL (ref 35.1–46.3)
EGFRCR SERPLBLD CKD-EPI 2021: 107 ML/MIN/1.73M2 (ref 60–?)
EOSINOPHIL # BLD AUTO: 0.54 X10(3) UL (ref 0–0.7)
EOSINOPHIL NFR BLD AUTO: 9.6 %
ERYTHROCYTE [DISTWIDTH] IN BLOOD BY AUTOMATED COUNT: 13.1 % (ref 11–15)
FASTING PATIENT LIPID ANSWER: YES
FASTING STATUS PATIENT QL REPORTED: YES
GLOBULIN PLAS-MCNC: 3.9 G/DL (ref 2.8–4.4)
GLUCOSE BLD-MCNC: 86 MG/DL (ref 70–99)
HCT VFR BLD AUTO: 42.3 %
HDLC SERPL-MCNC: 52 MG/DL (ref 40–59)
HGB BLD-MCNC: 13.5 G/DL
IMM GRANULOCYTES # BLD AUTO: 0.02 X10(3) UL (ref 0–1)
IMM GRANULOCYTES NFR BLD: 0.4 %
LDLC SERPL CALC-MCNC: 70 MG/DL (ref ?–100)
LYMPHOCYTES # BLD AUTO: 1.62 X10(3) UL (ref 1–4)
LYMPHOCYTES NFR BLD AUTO: 28.9 %
MCH RBC QN AUTO: 31.3 PG (ref 26–34)
MCHC RBC AUTO-ENTMCNC: 31.9 G/DL (ref 31–37)
MCV RBC AUTO: 97.9 FL
MONOCYTES # BLD AUTO: 0.35 X10(3) UL (ref 0.1–1)
MONOCYTES NFR BLD AUTO: 6.2 %
NEUTROPHILS # BLD AUTO: 3.03 X10 (3) UL (ref 1.5–7.7)
NEUTROPHILS # BLD AUTO: 3.03 X10(3) UL (ref 1.5–7.7)
NEUTROPHILS NFR BLD AUTO: 54 %
NONHDLC SERPL-MCNC: 94 MG/DL (ref ?–130)
OSMOLALITY SERPL CALC.SUM OF ELEC: 293 MOSM/KG (ref 275–295)
PLATELET # BLD AUTO: 263 10(3)UL (ref 150–450)
POTASSIUM SERPL-SCNC: 3.8 MMOL/L (ref 3.5–5.1)
PROT SERPL-MCNC: 7.5 G/DL (ref 6.4–8.2)
RBC # BLD AUTO: 4.32 X10(6)UL
SODIUM SERPL-SCNC: 142 MMOL/L (ref 136–145)
TRIGL SERPL-MCNC: 136 MG/DL (ref 30–149)
TSI SER-ACNC: 1.95 MIU/ML (ref 0.36–3.74)
VIT B12 SERPL-MCNC: 425 PG/ML (ref 193–986)
VIT D+METAB SERPL-MCNC: 33.1 NG/ML (ref 30–100)
VLDLC SERPL CALC-MCNC: 21 MG/DL (ref 0–30)
WBC # BLD AUTO: 5.6 X10(3) UL (ref 4–11)

## 2023-10-14 PROCEDURE — 36415 COLL VENOUS BLD VENIPUNCTURE: CPT

## 2023-10-14 PROCEDURE — 84443 ASSAY THYROID STIM HORMONE: CPT

## 2023-10-14 PROCEDURE — 82607 VITAMIN B-12: CPT

## 2023-10-14 PROCEDURE — 80061 LIPID PANEL: CPT

## 2023-10-14 PROCEDURE — 85025 COMPLETE CBC W/AUTO DIFF WBC: CPT

## 2023-10-14 PROCEDURE — 80053 COMPREHEN METABOLIC PANEL: CPT

## 2023-10-14 PROCEDURE — 82306 VITAMIN D 25 HYDROXY: CPT

## 2023-10-26 ENCOUNTER — OFFICE VISIT (OUTPATIENT)
Dept: FAMILY MEDICINE CLINIC | Facility: CLINIC | Age: 53
End: 2023-10-26

## 2023-10-26 ENCOUNTER — HOSPITAL ENCOUNTER (OUTPATIENT)
Dept: GENERAL RADIOLOGY | Age: 53
Discharge: HOME | End: 2023-10-26
Attending: FAMILY MEDICINE

## 2023-10-26 VITALS
SYSTOLIC BLOOD PRESSURE: 129 MMHG | HEART RATE: 57 BPM | WEIGHT: 194 LBS | HEIGHT: 67 IN | BODY MASS INDEX: 30.45 KG/M2 | DIASTOLIC BLOOD PRESSURE: 85 MMHG

## 2023-10-26 DIAGNOSIS — R93.89 ABNORMAL X-RAY: Primary | ICD-10-CM

## 2023-10-26 DIAGNOSIS — R93.89 ABNORMAL X-RAY: ICD-10-CM

## 2023-10-26 DIAGNOSIS — J30.9 ALLERGIC RHINITIS, UNSPECIFIED SEASONALITY, UNSPECIFIED TRIGGER: ICD-10-CM

## 2023-10-26 DIAGNOSIS — J45.20 MILD INTERMITTENT ASTHMA WITHOUT COMPLICATION: ICD-10-CM

## 2023-10-26 PROBLEM — J44.89 ASTHMA WITH COPD (CHRONIC OBSTRUCTIVE PULMONARY DISEASE) (HCC): Chronic | Status: ACTIVE | Noted: 2023-10-26

## 2023-10-26 PROBLEM — J44.89 ASTHMA WITH COPD (CHRONIC OBSTRUCTIVE PULMONARY DISEASE): Chronic | Status: ACTIVE | Noted: 2023-10-26

## 2023-10-26 PROBLEM — J44.89 ASTHMA WITH COPD (CHRONIC OBSTRUCTIVE PULMONARY DISEASE) (HCC): Chronic | Status: RESOLVED | Noted: 2023-10-26 | Resolved: 2023-10-26

## 2023-10-26 PROBLEM — J44.89 ASTHMA WITH COPD (CHRONIC OBSTRUCTIVE PULMONARY DISEASE): Chronic | Status: RESOLVED | Noted: 2023-10-26 | Resolved: 2023-10-26

## 2023-10-26 PROCEDURE — 3008F BODY MASS INDEX DOCD: CPT | Performed by: FAMILY MEDICINE

## 2023-10-26 PROCEDURE — 90686 IIV4 VACC NO PRSV 0.5 ML IM: CPT | Performed by: FAMILY MEDICINE

## 2023-10-26 PROCEDURE — G0009 ADMIN PNEUMOCOCCAL VACCINE: HCPCS | Performed by: FAMILY MEDICINE

## 2023-10-26 PROCEDURE — 3074F SYST BP LT 130 MM HG: CPT | Performed by: FAMILY MEDICINE

## 2023-10-26 PROCEDURE — G0008 ADMIN INFLUENZA VIRUS VAC: HCPCS | Performed by: FAMILY MEDICINE

## 2023-10-26 PROCEDURE — 3079F DIAST BP 80-89 MM HG: CPT | Performed by: FAMILY MEDICINE

## 2023-10-26 PROCEDURE — 99214 OFFICE O/P EST MOD 30 MIN: CPT | Performed by: FAMILY MEDICINE

## 2023-10-26 PROCEDURE — 90677 PCV20 VACCINE IM: CPT | Performed by: FAMILY MEDICINE

## 2023-10-26 PROCEDURE — 71046 X-RAY EXAM CHEST 2 VIEWS: CPT | Performed by: FAMILY MEDICINE

## 2023-10-26 RX ORDER — PREDNISONE 20 MG/1
40 TABLET ORAL DAILY
Qty: 10 TABLET | Refills: 0 | Status: SHIPPED | OUTPATIENT
Start: 2023-10-26 | End: 2023-10-31

## 2023-10-26 NOTE — PROGRESS NOTES
Area of original concern improved on the chest x-ray but does mention possible enlarged heart so I ordered an echo - ultrasound of the heart.  Please schedule that appointment. - Dr. Yaneli Martinez

## 2023-10-30 DIAGNOSIS — I51.7 CARDIOMEGALY: Primary | ICD-10-CM

## 2023-12-01 RX ORDER — LEVOCETIRIZINE DIHYDROCHLORIDE 5 MG/1
5 TABLET, FILM COATED ORAL EVERY EVENING
Qty: 90 TABLET | Refills: 3 | Status: SHIPPED | OUTPATIENT
Start: 2023-12-01

## 2023-12-01 NOTE — TELEPHONE ENCOUNTER
Refill passed per [facility] protocol.   Requested Prescriptions   Pending Prescriptions Disp Refills    LEVOCETIRIZINE 5 MG Oral Tab [Pharmacy Med Name: LEVOCETIRIZINE 5MG TABLETS] 90 tablet 2     Sig: TAKE 1 TABLET(5 MG) BY MOUTH EVERY EVENING       Allergy Medication Protocol Passed - 11/30/2023  4:22 PM        Passed - In person appointment or virtual visit in the past 12 mos or appointment in next 3 mos     Recent Outpatient Visits              1 month ago Abnormal x-ray    [address] [facility], Acosta Ibarra MD    Office Visit    5 months ago Chronic pain of right knee    [facility], [address] MD    Office Visit    7 months ago Vaginal itching    [facility], [address], [address], [place], DO    Office Visit    9 months ago Routine medical exam    [address] [facility], Jodie Zuniga MD    Office Visit    1 year ago Acute pain of right knee    [facility], [address], ESTHER Schaffer    Office Visit          Future Appointments         Provider Department Appt Notes    [facility] in [facility],                   Recent Outpatient Visits              1 month ago Abnormal x-ray    [address] [facility], Acosta Ibarra MD    Office Visit    5 months ago Chronic pain of right knee    [facility], [address] MD    Office Visit    7 months ago Vaginal itching    [facility], [address], [address], [place], DO    Office Visit    9 months ago Routine medical exam    [address] [facility], Acosta Ibarra MD    Office Visit    1 year ago Acute pain of right knee    [facility], [address], ESTHER Schaffer    Office Visit Future Appointments         Provider Department Appt Notes    In 1 month [address] ECHO Alexey Woo order in [facility], [place]

## 2024-01-08 ENCOUNTER — HOSPITAL ENCOUNTER (OUTPATIENT)
Dept: CV DIAGNOSTICS | Facility: HOSPITAL | Age: 54
Discharge: HOME | End: 2024-01-08
Attending: FAMILY MEDICINE
Payer: MEDICARE

## 2024-01-08 DIAGNOSIS — I51.7 CARDIOMEGALY: ICD-10-CM

## 2024-01-08 PROCEDURE — 93306 TTE W/DOPPLER COMPLETE: CPT | Performed by: FAMILY MEDICINE

## 2024-01-25 ENCOUNTER — OFFICE VISIT (OUTPATIENT)
Dept: FAMILY MEDICINE CLINIC | Facility: CLINIC | Age: 54
End: 2024-01-25
Payer: MEDICARE

## 2024-01-25 VITALS
WEIGHT: 196 LBS | HEIGHT: 67 IN | SYSTOLIC BLOOD PRESSURE: 127 MMHG | BODY MASS INDEX: 30.76 KG/M2 | DIASTOLIC BLOOD PRESSURE: 78 MMHG | HEART RATE: 58 BPM

## 2024-01-25 DIAGNOSIS — N63.42 SUBAREOLAR MASS OF LEFT BREAST: ICD-10-CM

## 2024-01-25 DIAGNOSIS — N61.0 MASTITIS: Primary | ICD-10-CM

## 2024-01-25 PROCEDURE — 3078F DIAST BP <80 MM HG: CPT

## 2024-01-25 PROCEDURE — 3074F SYST BP LT 130 MM HG: CPT

## 2024-01-25 PROCEDURE — 3008F BODY MASS INDEX DOCD: CPT

## 2024-01-25 PROCEDURE — 99213 OFFICE O/P EST LOW 20 MIN: CPT

## 2024-01-25 RX ORDER — FLUTICASONE PROPIONATE AND SALMETEROL 50; 500 UG/1; UG/1
1 POWDER RESPIRATORY (INHALATION) 2 TIMES DAILY
COMMUNITY
Start: 2024-01-02

## 2024-01-25 RX ORDER — AMOXICILLIN 875 MG/1
875 TABLET, COATED ORAL 2 TIMES DAILY
Qty: 10 TABLET | Refills: 0 | Status: SHIPPED | OUTPATIENT
Start: 2024-01-25 | End: 2024-01-30

## 2024-01-25 NOTE — ASSESSMENT & PLAN NOTE
Tenderness subareolar. No redness or erythema. Negative axilla lymphadenopathy.  Will cross cover with amoxicillin 875 BID x5d.  Diagnostic mammogram  Tylenol as needed for pain.

## 2024-01-25 NOTE — PROGRESS NOTES
Subjective:   Lauren Ketaing is a 53 year old female who presents for Breast Lump (Pt states last night she discovered a little lump on left breast. Lump is painful to touch.   Roxie :[id number])   Patient is a pleasant 53-year-old female past medical history consistent for asthma, DJD, PUD, fibroids, and vitamin D deficiency.  Patient presents to office today for pain in breast.  Patient states via Roxie [phone] I have a little lump on my left breast and it hurts. She just noticed lump yesterday, but has had pain for a couple days. No fevers. Not breastfeeding. No discharge.         Past Medical History:   Diagnosis Date    Asthma     Back problem     Degenerative joint disease (DJD) of lumbar spine     Deviated septum 01/10/2022    History of blood transfusion     many years ago, no reactions    Hypertrophy of both inferior nasal turbinates 01/10/2022    Peptic ulcer disease     Uterine fibroid     Vitamin D deficiency       Past Surgical History:   Procedure Laterality Date    Cholecystectomy      Cyst aspiration left      Exc skin benig 2.1-3cm face,facial Right 10/14/2019    Excision turbinate,submucous  01/10/2022    Hc puncture aspiration breast cyst Left 10/12/2018    Hysterectomy      Laminec/facetect/foramin,lumbar  08/21/2018    and discectomy    Laparoscopic cholecystectomy  2003    Repair of nasal septum  01/10/2022    Repr cmpl wnd head,fac,hand 2.6-7.5 Right 10/14/2019    Spine surgery procedure unlisted      Total abdom hysterectomy  2000    Total abdominal hysterectomy  2004    unilateral salpingoophorectomy, postop bleeding req surgical exploration        History/Other:    Chief Complaint Reviewed and Verified  Nursing Notes Reviewed and   Verified  Tobacco Reviewed  Allergies Reviewed  Medications Reviewed    Problem List Reviewed  Medical History Reviewed  Surgical History   Reviewed  Family History Reviewed         Tobacco:  She has never smoked tobacco.    Current Outpatient  Medications   Medication Sig Dispense Refill    ADVAIR DISKUS 500-50 MCG/ACT Inhalation Aerosol Powder, Breath Activated Inhale 1 puff into the lungs 2 (two) times daily.      amoxicillin 875 MG Oral Tab Take 1 tablet (875 mg total) by mouth 2 (two) times daily for 5 days. 10 tablet 0    levocetirizine 5 MG Oral Tab Take 1 tablet (5 mg total) by mouth every evening. 90 tablet 3    albuterol 108 (90 Base) MCG/ACT Inhalation Aero Soln Inhale 2 puffs into the lungs every 6 (six) hours as needed for Wheezing. 18 g 3    montelukast 10 MG Oral Tab Take 1 tablet (10 mg total) by mouth nightly. 90 tablet 4    omeprazole 20 MG Oral Capsule Delayed Release Take 1 capsule (20 mg total) by mouth every morning before breakfast. 30 capsule 5    azelastine 0.1 % Nasal Solution 2 sprays by Nasal route daily. 1 each 4    traMADol 50 MG Oral Tab Take 1 tablet (50 mg total) by mouth daily as needed.      Cholecalciferol (VITAMIN D) 2000 units Oral Cap Take 1,000 capsules (2,000,000 Units total) by mouth daily.      naproxen 500 MG Oral Tab Take 1 tablet (500 mg total) by mouth 2 (two) times daily with meals. (Patient not taking: Reported on 10/26/2023) 60 tablet 0         Review of Systems:  Review of Systems   Constitutional: Negative.  Negative for activity change and appetite change.        Left breast lump and pain   HENT: Negative.  Negative for congestion, postnasal drip, rhinorrhea, sore throat, tinnitus and voice change.    Eyes: Negative.    Respiratory: Negative.  Negative for apnea, cough, chest tightness and shortness of breath.    Cardiovascular:  Negative for chest pain and leg swelling.   Gastrointestinal: Negative.  Negative for abdominal pain, anal bleeding and blood in stool.   Genitourinary: Negative.  Negative for difficulty urinating, flank pain and menstrual problem.   Musculoskeletal: Negative.  Negative for joint swelling.   Skin: Negative.    Neurological: Negative.  Negative for dizziness and headaches.    Psychiatric/Behavioral: Negative.  Negative for agitation.          Objective:   /78   Pulse 58   Ht 5' 7\" (1.702 m)   Wt 196 lb (88.9 kg)   BMI 30.70 kg/m²  Estimated body mass index is 30.7 kg/m² as calculated from the following:    Height as of this encounter: 5' 7\" (1.702 m).    Weight as of this encounter: 196 lb (88.9 kg).  Physical Exam  Vitals and nursing note reviewed. Exam conducted with a chaperone present.   Constitutional:       Appearance: Normal appearance. She is normal weight.   HENT:      Head: Normocephalic.      Right Ear: External ear normal.      Left Ear: External ear normal.      Nose: Nose normal.      Mouth/Throat:      Mouth: Mucous membranes are moist.      Pharynx: Oropharynx is clear.   Cardiovascular:      Rate and Rhythm: Normal rate and regular rhythm.      Pulses: Normal pulses.      Heart sounds: Normal heart sounds.   Chest:      Chest wall: Mass and tenderness present.   Breasts:     Roman Score is 5.      Breasts are asymmetrical.      Right: Normal.      Left: Swelling, mass and tenderness present. No bleeding, inverted nipple, nipple discharge or skin change.      Comments: Large mole to left breast at 4-5 Oclock.  Large tender mass sub areolar whole nipple.    Lymphadenopathy:      Upper Body:      Right upper body: No axillary adenopathy.      Left upper body: No axillary adenopathy.   Neurological:      Mental Status: She is alert.           Assessment & Plan:   1. Mastitis (Primary)  Assessment & Plan:  Tenderness subareolar. No redness or erythema. Negative axilla lymphadenopathy.  Will cross cover with amoxicillin 875 BID x5d.  Diagnostic mammogram  Tylenol as needed for pain.   Orders:  -     Amoxicillin; Take 1 tablet (875 mg total) by mouth 2 (two) times daily for 5 days.  Dispense: 10 tablet; Refill: 0  2. Subareolar mass of left breast  Assessment & Plan:  Diagnostic mammogram bilat stat.  Tylenol as needed for pain.  Await results.     Orders:  -      [facility] (CPT=77066); Future; Expected date: 01/25/2024        Return if symptoms worsen or fail to improve, after mammogram.    Bogdan Collins, ESTHER, 1/25/2024, 11:04 AM

## 2024-02-06 ENCOUNTER — HOSPITAL ENCOUNTER (OUTPATIENT)
Dept: ULTRASOUND IMAGING | Facility: HOSPITAL | Age: 54
Discharge: HOME | End: 2024-02-06
Payer: MEDICARE

## 2024-02-06 ENCOUNTER — HOSPITAL ENCOUNTER (OUTPATIENT)
Dept: MAMMOGRAPHY | Facility: HOSPITAL | Age: 54
Discharge: HOME | End: 2024-02-06
Payer: MEDICARE

## 2024-02-06 ENCOUNTER — TELEPHONE (OUTPATIENT)
Dept: FAMILY MEDICINE CLINIC | Facility: CLINIC | Age: 54
End: 2024-02-06

## 2024-02-06 DIAGNOSIS — N63.42 SUBAREOLAR MASS OF LEFT BREAST: ICD-10-CM

## 2024-02-06 DIAGNOSIS — N63.20 MASS OF LEFT BREAST, UNSPECIFIED QUADRANT: Primary | ICD-10-CM

## 2024-02-06 PROCEDURE — 77062 BREAST TOMOSYNTHESIS BI: CPT

## 2024-02-06 PROCEDURE — 76642 ULTRASOUND BREAST LIMITED: CPT

## 2024-02-06 PROCEDURE — 77066 DX MAMMO INCL CAD BI: CPT

## 2024-02-06 NOTE — IMAGING NOTE
1026 am This nurse introduced utilizing language line Alicia Rhoades  Client Id # [id number] self and role of breast coordinator.  Discussed recommended breast biopsy with patient. Pt was recommended by Dr. Melendrez to have a left  breast ultrasound guided biopsy. While Dr Melendrez was explaining why Bx was needed the call dropped.  1036 am I was able to re  connect with language line again Johnny client id# [id number]. Johnny assisted us for an additional 29 minutes. Orders requested.  She is  spouses name is Sha has 1 son age 31 1 dtr 32.    Pt history discussed as below:  Pt history of biopsy:no        Family history of cancer:no  Pt history of breast cancer:no  Hx BCP use:       no             HRT use:   hys age 31    ivf no     RECOMMENDATIONS:   ULTRASOUND-GUIDED CORE BIOPSY: LEFT BREAST       SHORT TERM FOLLOW-UP DIAGNOSTIC MAMMOGRAM RIGHT BREAST IN 6 MONTHS.       SHORT TERM FOLLOW-UP ULTRASOUND RIGHT BREAST IN 6 MONTHS.       Recommedations :                      see epic for dictated radiology report   Reviewed pertinent patient history, family history of cancer, and patient medications.     Discussed with patient Radiology’s protocol regarding medications, as well as over-the-counter vitamin or herbal supplements, as follows:   -All herbal supplements, Vitamin E, Fish Oil  green tea ,all nsaids like   ibuprofen, Motrin, Advil, Aleve, or other anti-inflammatory medication)   and aspirin 81 mg to be held for 5 days prior to biopsy-Aspirin (325mg) or 81 mg  aspirin  being taken related to a cardiac condition should be held at the  direction of your physician.  Radiology's preference is to hold this medication for 7  days prior to biopsy.  Informed patient to call cardiologist to go off aspirin.  Denies usage      -Blood thinners/antiplatelet medications (Coumadin, Plavix  xeralto , effient brilinta   eliquis etc) should be held at the  direction of your physician.  Radiology's preference is to hold  these medications for   5 days prior to biopsy.   Denies usage     Reviewed US guided biopsy procedure, as below.  You will be lying on your back, potentially slightly toward one side, for this procedure.  The US technician will use the ultrasound machine to locate the area in question that was seen on your previous breast imaging.  The Radiologist will then inject a local numbing medication into the area. This may burn and sting for several seconds .  Then use a needle to collect cells or tissue from the site.  A marker, or clip, will then be placed in the biopsied area.  This marker is placed so this biopsy site is able to be accurately located upon future breast imaging.  After the clip is placed, steri strips will be applied to  the biopsy site and should be kept on for 5 days.  Additional mammography films will then be taken to assure correct placement of the placed marker.    Educated the patient they will be awake during this procedure and are able to drive themselves home if they wish.  Educated patient that they should eat breakfast and park in green lot and check in with [facility] .  Educated patient that some soreness  may occur after biopsy.  Discussed use of a supportive bra and ice packs after procedure, to decrease soreness.  Tylenol only for discomfort unless they have an allergy to tylenol .  Discussed with patient no swimming, bathing,  hot tubs or submerging underwater  for 5 days post procedure until the incision is closed and healed.   Educated patient on lifting restrictions - nothing heavier than a gallon of milk for 24-48 hours after the procedure.      Discussed with patient that some soreness and bruising is normal after biopsy but that prolonged or increased pain and bruising should be reported to the ordering physician.   Educated patient that they should bring a sports bra or form fitting bra on day of procedure. Educated patient that this helps with comfort after the biopsy and  decrease swelling.  Reviewed results process with patient and shared that pathology results will be available within 2-3 business days of their biopsy.  Discussed results will be communicated by their ordering physician unless otherwise indicated.  Educated patient that once we receive an order from her physician  our radiology secretaries would be calling   to schedule the biopsy.

## 2024-02-07 DIAGNOSIS — R92.8 ABNORMAL MAMMOGRAM: Primary | ICD-10-CM

## 2024-02-09 NOTE — DISCHARGE INSTRUCTIONS
The Doctor (Radiologist) who performed your procedure was: DR MALIK    Place an ice pack over the biopsy site on top of your bra or on top of the ACE wrap (never apply ice directly over skin) for 10-15 minutes of every hour until bedtime for your comfort and to decrease bleeding.  Keep your sports bra or the ACE wrap (stereotactic and MRI biopsy) in place for 24 hours after your biopsy. This compression decreases bleeding and breast movement for your comfort. Wear a supportive bra for the next couple of days for comfort (sports bra for sleep).   Continue to wear, preferably, a sports bra or good supportive bra for 1 week and take off only to shower.  No baths or showers during the first 24 hours after biopsy. After this time you may take a shower. It's okay if the strips get wet but do not soak them. NO saunas, hot tubs or swimming until steri-strips fall off (approx. 5 days). This prevents infection and allows time for them to completely close and heal.  DO NOT remove the steri-strips. They will fall off in 5 days. If any type of irritation (redness, itching or blisters) develops in the area around the steri-strips, remove them gently. If the steri-strips do not fall off after 5 days, gently remove them. Keep the area clean and dry.  It is normal to have mild discomfort and bruising at the biopsy site.  You may take Tylenol as needed for discomfort, as long as you have no allergies to Tylenol. Do not take aspirin, motrin, ibuprofen or any medication containing NSAID (non-steroidal anti-inflammatory drug) product for 48 hours.  DO NOT participate in strenuous activity (aerobics, heavy lifting, housework, gardening, etc.) 48 hours after your biopsy to prevent bleeding.  You will receive results in 2-3 business days.  If you are having an MRI breast biopsy or an Ultrasound guided breast biopsy, you will be billed for the biopsy and unilateral mammogram separately.  If you have any questions about the procedure or  your results, please contact the Breast Care Coordinator Nurse at [phone].  Notify your ordering physician or primary physician for increased bleeding, pain or fever over 100. Or contact a Radiology Nurse at [phone] between 8am-4pm (after 4pm, your call will be directed to the [facility]).

## 2024-02-12 ENCOUNTER — TELEPHONE (OUTPATIENT)
Dept: FAMILY MEDICINE CLINIC | Facility: CLINIC | Age: 54
End: 2024-02-12

## 2024-02-12 NOTE — TELEPHONE ENCOUNTER
Current Outpatient Medications:     ADVAIR DISKUS 500-50 MCG/ACT Inhalation Aerosol Powder, Breath Activated, Inhale 1 puff into the lungs 2 (two) times daily., Disp: , Rfl:

## 2024-02-13 RX ORDER — FLUTICASONE PROPIONATE AND SALMETEROL 500; 50 UG/1; UG/1
1 POWDER RESPIRATORY (INHALATION) 2 TIMES DAILY
Qty: 60 EACH | Refills: 11 | Status: SHIPPED | OUTPATIENT
Start: 2024-02-13 | End: 2024-03-14

## 2024-02-15 ENCOUNTER — HOSPITAL ENCOUNTER (OUTPATIENT)
Dept: MAMMOGRAPHY | Facility: HOSPITAL | Age: 54
Discharge: HOME | End: 2024-02-15
Attending: FAMILY MEDICINE
Payer: MEDICARE

## 2024-02-15 ENCOUNTER — HOSPITAL ENCOUNTER (OUTPATIENT)
Dept: ULTRASOUND IMAGING | Facility: HOSPITAL | Age: 54
Discharge: HOME | End: 2024-02-15
Attending: FAMILY MEDICINE
Payer: MEDICARE

## 2024-02-15 DIAGNOSIS — N63.20 MASS OF LEFT BREAST, UNSPECIFIED QUADRANT: ICD-10-CM

## 2024-02-15 PROCEDURE — 19083 BX BREAST 1ST LESION US IMAG: CPT | Performed by: FAMILY MEDICINE

## 2024-02-15 PROCEDURE — 77065 DX MAMMO INCL CAD UNI: CPT | Performed by: FAMILY MEDICINE

## 2024-02-15 PROCEDURE — 88305 TISSUE EXAM BY PATHOLOGIST: CPT | Performed by: FAMILY MEDICINE

## 2024-02-15 NOTE — IMAGING NOTE
0705 Pt arrived to room #4 .   scans taken by [facility] ultrasound technologist    0706  Hx taken and is as follows: CONCLUSION:     1. Incidental left breast sonographic mass at 09:00 o'clock subareolar region is low suspicion for malignancy.  Ultrasound-guided biopsy is recommended.      0711  Consent verified and obtained     0726  Imaging Completed by Dr Melendrez     0726 Time out taken     0727  Skin prep with chloro prep sterile towels to site. Site marked left breast 9 o'clock subareolar     0731 Lidocaine  1% 10 milligrams per ml given from kit  total amount  3ml given.    0731 Lidocaine 1% with epinephrine  1:100,000 units 200 milligrams per  20 ml given total amount 5 ml given.    14  Gauge Bard biopsy device   to be used for core samples     Core # 1 at 0732 all cores to be placed in sterile cup with 10 ml ns     Total amount cores taken 3 placed in formalin at 0734    0734    HYDROMARK COIL Clip placed      0735  Procedure completed. Pressure to site . No active bleeding noted.  Area cleaned steri strips to site. Ice pack to site .     0705 Post instructions given verbal et written. Avs summary sheet provided to patient. Patient verbalizes understanding and agreement .    0740  Specimen taken to pathology by [facility] US TECH     0739 To mammography department  for post clip images . Report to VIRIDIANA  Mammography technologist. Mammography department to discharge patient after images completed.

## 2024-02-15 NOTE — PROCEDURES
[facility]  Procedure Note    Lauren Keating Patient Status:  Outpatient    1970 MRN [id number]   Location [facility] Attending Piedad Whitley MD   Hosp Day # 0 PCP Piedad Whitley MD     Procedure: Left breast ultrasound guided biopsy    Pre-Procedure Diagnosis:  Left breast mass    Post-Procedure Diagnosis: Left breast mass    Anesthesia:  Local    Findings:  Left breast mass    Specimens: multiple cores    Blood Loss:  minimal    Tourniquet Time: none  Complications:  None  Drains:  none        Jayshree Melendrez MD  2/15/2024

## 2024-02-16 DIAGNOSIS — R92.8 ABNORMAL MAMMOGRAM: Primary | ICD-10-CM

## 2024-02-19 ENCOUNTER — TELEPHONE (OUTPATIENT)
Dept: MAMMOGRAPHY | Facility: HOSPITAL | Age: 54
End: 2024-02-19

## 2024-02-19 NOTE — TELEPHONE ENCOUNTER
Lauren Keating   is s/p biopsy .  Phoned utilizing language eleonora Lewis client id #[id number] and introduced myself as breast coordinator .  Reinforced to patient  post biopsy care and instructions . NO C/O POST BX     Informed  and shared the pathology results as well as the recommendations from Dr Melendrez for her breast imaging  as follows:      Pathology results shared (see epic for dictated pathology and radiology procedure report)  and recommendations are as follows:       Pathology demonstrates benign findings and is <<concordant>> with imaging.       RECOMMENDATION:  Per the 2/6/2024 report, a six-month follow-up right breast diagnostic mammogram and right breast ultrasound was recommended.                 Lauren Keatingacknowledges the above and denies questions. Lauren Keating was also instructed to perform breast self exams and if any changes  develops any changes to contact ordering  physician immediately  for re evaluation..  Lauren Keatingverbalizes understanding and agreement.

## 2024-02-20 ENCOUNTER — HOSPITAL ENCOUNTER (OUTPATIENT)
Age: 54
Discharge: HOME | End: 2024-02-20
Payer: MEDICARE

## 2024-02-20 ENCOUNTER — APPOINTMENT (OUTPATIENT)
Dept: GENERAL RADIOLOGY | Age: 54
End: 2024-02-20
Attending: PHYSICIAN ASSISTANT
Payer: MEDICARE

## 2024-02-20 VITALS
SYSTOLIC BLOOD PRESSURE: 139 MMHG | DIASTOLIC BLOOD PRESSURE: 65 MMHG | TEMPERATURE: 98 F | RESPIRATION RATE: 18 BRPM | HEART RATE: 63 BPM | OXYGEN SATURATION: 98 %

## 2024-02-20 DIAGNOSIS — J40 BRONCHITIS: Primary | ICD-10-CM

## 2024-02-20 LAB
POCT INFLUENZA A: NEGATIVE
POCT INFLUENZA B: NEGATIVE
SARS-COV-2 RNA RESP QL NAA+PROBE: NOT DETECTED

## 2024-02-20 PROCEDURE — 99213 OFFICE O/P EST LOW 20 MIN: CPT | Performed by: PHYSICIAN ASSISTANT

## 2024-02-20 PROCEDURE — U0002 COVID-19 LAB TEST NON-CDC: HCPCS | Performed by: PHYSICIAN ASSISTANT

## 2024-02-20 PROCEDURE — 87502 INFLUENZA DNA AMP PROBE: CPT | Performed by: PHYSICIAN ASSISTANT

## 2024-02-20 PROCEDURE — 71046 X-RAY EXAM CHEST 2 VIEWS: CPT | Performed by: PHYSICIAN ASSISTANT

## 2024-02-20 RX ORDER — PREDNISONE 20 MG/1
40 TABLET ORAL DAILY
Qty: 10 TABLET | Refills: 0 | Status: SHIPPED | OUTPATIENT
Start: 2024-02-20 | End: 2024-02-25

## 2024-02-20 RX ORDER — BENZONATATE 100 MG/1
100 CAPSULE ORAL 3 TIMES DAILY PRN
Qty: 30 CAPSULE | Refills: 0 | Status: SHIPPED | OUTPATIENT
Start: 2024-02-20 | End: 2024-03-21

## 2024-02-20 NOTE — ED PROVIDER NOTES
Chief Complaint   Patient presents with    Cough       HPI:     Lauren Keating is a 53 year old female who presents for evaluation of ongoing chest congestion over the last week.  Taking baseline Advair with history of asthma without nebulizer or rescue inhaler with mild improvement, last dose this morning.  Notes mild nasal congestion associated without fever or antipyretic use over the last few days.  Denies any decongestion over the last few days as well.  Notes periodic headache, 3 out of 10, not worst of life.  Denies ear pain sore throat neck pain chest pain shortness of breath abdominal pain vomiting diarrhea dysuria or rash.      PFSH    PFSH asessment screens reviewed and agree.  Nurses notes reviewed I agree with documentation.    Family History   Problem Relation Age of Onset    No Known Problems Mother     No Known Problems Father     Diabetes Maternal Grandmother     Hypertension Maternal Grandmother     Hypertension Maternal Grandfather     Breast Cancer Neg     Ovarian Cancer Neg      Family history reviewed with patient/caregiver and is not pertinent to presenting problem.  Social History     Socioeconomic History    Marital status:      Spouse name: Not on file    Number of children: 2    Years of education: Not on file    Highest education level: Not on file   Occupational History    Occupation: shipping     Comment: disabled   Tobacco Use    Smoking status: Never    Smokeless tobacco: Never   Vaping Use    Vaping Use: Never used   Substance and Sexual Activity    Alcohol use: Never    Drug use: Never    Sexual activity: Not on file   Other Topics Concern     Service Not Asked    Blood Transfusions Not Asked    Caffeine Concern Yes     Comment: pop some time    Occupational Exposure Not Asked    Hobby Hazards Not Asked    Sleep Concern Not Asked    Stress Concern Not Asked    Weight Concern Not Asked    Special Diet Not Asked    Back Care Not Asked    Exercise No    Bike Helmet Not Asked     Seat Belt Not Asked    Self-Exams Not Asked    Grew up on a farm Not Asked    History of tanning Not Asked    Outdoor occupation Not Asked    Pt has a pacemaker No    Pt has a defibrillator No    Breast feeding Not Asked    Reaction to local anesthetic No   Social History Narrative    The patient does not use an assistive device..      The patient does not live in a home with stairs.     Social Determinants of Health     Financial Resource Strain: Not on file   Food Insecurity: Not on file   Transportation Needs: Not on file   Physical Activity: Not on file   Stress: Not on file   Social Connections: Not on file   Housing Stability: Not on file         ROS:   Positive for stated complaint: Nasal congestion, chest congestion  All other systems reviewed and negative except as noted above.  Constitutional and Vital Signs Reviewed.      Physical Exam:     Findings:    /65   Pulse 63   Temp 97.8 °F (36.6 °C) (Temporal)   Resp 18   SpO2 98%   GENERAL: well developed, well nourished, well hydrated, no distress  SKIN: good skin turgor, no obvious rashes  NECK: No JVD.  Supple, no adenopathy  EXTREMITIES: no cyanosis or edema. CHOPRA without difficulty  GI: soft, non-tender, normal bowel sounds  HEAD: normocephalic, atraumatic  EYES: sclera non icteric bilateral, conjunctiva clear  EARS: TMs clear bilaterally. Canals clear.  NOSE: Positive rhinorrhea.  MMM.  Nasal turbinates: pink, normal mucosa  THROAT: clear, without exudates, uvula midline, and airway patent  LUNGS: No retractions.  Clear to auscultation bilaterally; no rales, rhonchi, or wheezes  NEURO: No focal deficits  PSYCH: Alert and oriented x3.  Answering questions appropriately.  Mood appropriate.    MDM/Assessment/Plan:   Orders for this encounter:    Orders Placed This Encounter    XR CHEST PA + LAT CHEST (OJT=34940)     Order Specific Question:   What is the Relevant Clinical Indication / Reason for Exam?     Answer:   cough 1 wk r/o infiltrate      Order Specific Question:   Release to patient     Answer:   Immediate    POCT Flu Test     Order Specific Question:   Release to patient     Answer:   Immediate    Rapid SARS-CoV-2 by PCR     Order Specific Question:   Release to patient     Answer:   Immediate    benzonatate 100 MG Oral Cap     Sig: Take 1 capsule (100 mg total) by mouth 3 (three) times daily as needed for cough.     Dispense:  30 capsule     Refill:  0    predniSONE 20 MG Oral Tab     Sig: Take 2 tablets (40 mg total) by mouth daily for 5 days.     Dispense:  10 tablet     Refill:  0       Labs performed this visit:  Recent Results (from the past 10 hour(s))   POCT Flu Test    Collection Time: 02/20/24  2:47 PM    Specimen: Nares; Other   Result Value Ref Range    POCT INFLUENZA A Negative Negative    POCT INFLUENZA B Negative Negative   Rapid SARS-CoV-2 by PCR    Collection Time: 02/20/24  2:47 PM    Specimen: Nares; Other   Result Value Ref Range    Rapid SARS-CoV-2 by PCR Not Detected Not Detected       MDM:  Swabs negative, chest x-ray no infiltrate, exams and vital signs stable.  Instructed via translation iPad on evaluation history most reflective of viral etiology.  Patient agrees with no formal antibiotics yet to readdress of lingering symptoms with continuation of inhaler corticosteroid with oral and cough suppressants for the next few days.  Patient also agrees to use rescue inhaler in between Advair for supportive bronchospasms.  Happy with plan of care.  Discussed indications to go to the ER including breakthrough chest pain or shortness of breath representative exam or history today.    Diagnosis:    ICD-10-CM    1. Bronchitis  J40 XR CHEST PA + LAT CHEST (CPT=71046)     POCT Flu Test     Rapid SARS-CoV-2 by PCR     XR CHEST PA + LAT CHEST (CPT=71046)     POCT Flu Test     Rapid SARS-CoV-2 by PCR          All results reviewed and discussed with patient.  See AVS for detailed discharge instructions for your condition today.    Follow  Up with:  Piedad Whitley MD  [address] [place] [address]  [address]  [facility] [address]  [phone]    Schedule an appointment as soon as possible for a visit in 3 days  As needed, If symptoms worsen

## 2024-05-21 ENCOUNTER — OFFICE VISIT (OUTPATIENT)
Dept: FAMILY MEDICINE CLINIC | Facility: CLINIC | Age: 54
End: 2024-05-21

## 2024-05-21 VITALS
SYSTOLIC BLOOD PRESSURE: 125 MMHG | TEMPERATURE: 98 F | HEART RATE: 54 BPM | DIASTOLIC BLOOD PRESSURE: 74 MMHG | BODY MASS INDEX: 30.76 KG/M2 | HEIGHT: 67 IN | WEIGHT: 196 LBS

## 2024-05-21 DIAGNOSIS — N64.9 BREAST LESION: ICD-10-CM

## 2024-05-21 DIAGNOSIS — M79.671 PAIN OF RIGHT HEEL: Primary | ICD-10-CM

## 2024-05-21 PROBLEM — J44.89 ASTHMA WITH COPD (CHRONIC OBSTRUCTIVE PULMONARY DISEASE) (HCC): Chronic | Status: ACTIVE | Noted: 2024-05-21

## 2024-05-21 PROCEDURE — 99214 OFFICE O/P EST MOD 30 MIN: CPT | Performed by: FAMILY MEDICINE

## 2024-05-21 PROCEDURE — G2211 COMPLEX E/M VISIT ADD ON: HCPCS | Performed by: FAMILY MEDICINE

## 2024-05-21 NOTE — PROGRESS NOTES
Lauren Keating is a 53 year old female.   Chief Complaint   Patient presents with    Heel Pain     Right heel pain x 3 weeks     HPI:   Reports never had this problem. Pain in right heel. Reports usually wearing gym shoes but today sandals because could not handle the pain.   Also reports mole on left breast.   Current Outpatient Medications on File Prior to Visit   Medication Sig Dispense Refill    ADVAIR DISKUS 500-50 MCG/ACT Inhalation Aerosol Powder, Breath Activated Inhale 1 puff into the lungs 2 (two) times daily.      levocetirizine 5 MG Oral Tab Take 1 tablet (5 mg total) by mouth every evening. 90 tablet 3    albuterol 108 (90 Base) MCG/ACT Inhalation Aero Soln Inhale 2 puffs into the lungs every 6 (six) hours as needed for Wheezing. 18 g 3    azelastine 0.1 % Nasal Solution 2 sprays by Nasal route daily. 1 each 4    traMADol 50 MG Oral Tab Take 1 tablet (50 mg total) by mouth daily as needed.      Cholecalciferol (VITAMIN D) 2000 units Oral Cap Take 1,000 capsules (2,000,000 Units total) by mouth daily.      montelukast 10 MG Oral Tab Take 1 tablet (10 mg total) by mouth nightly. (Patient not taking: Reported on 5/21/2024) 90 tablet 4    naproxen 500 MG Oral Tab Take 1 tablet (500 mg total) by mouth 2 (two) times daily with meals. (Patient not taking: Reported on 5/21/2024) 60 tablet 0     No current facility-administered medications on file prior to visit.      Past Medical History:    Asthma (HCC)    Back problem    Degenerative joint disease (DJD) of lumbar spine    Deviated septum    History of blood transfusion    many years ago, no reactions    Hypertrophy of both inferior nasal turbinates    Peptic ulcer disease    Uterine fibroid    Vitamin D deficiency      Social History:  Social History     Socioeconomic History    Marital status:     Number of children: 2   Occupational History    Occupation: shipping     Comment: disabled   Tobacco Use    Smoking status: Never    Smokeless tobacco: Never    Vaping Use    Vaping status: Never Used   Substance and Sexual Activity    Alcohol use: Never    Drug use: Never   Other Topics Concern    Caffeine Concern Yes     Comment: pop some time    Exercise No    Pt has a pacemaker No    Pt has a defibrillator No    Reaction to local anesthetic No   Social History Narrative    The patient does not use an assistive device..      The patient does not live in a home with stairs.        REVIEW OF SYSTEMS:   Review of Systems   See HPI     EXAM:   /78   Pulse 53   Temp 97.5 °F (36.4 °C) (Temporal)   Ht 5' 7\" (1.702 m)   Wt 196 lb (88.9 kg)   BMI 30.70 kg/m²   /74   Pulse 54   Temp 97.5 °F (36.4 °C) (Temporal)   Ht 5' 7\" (1.702 m)   Wt 196 lb (88.9 kg)   BMI 30.70 kg/m²     Physical Exam   Left breast \" stuck on\" appearing lesion but large and flaking   Right heal - tenderness at posterior aspect towards achilles and at plantar fascia insertion point   ASSESSMENT AND PLAN:   1. Pain of right heel  Handout given on stretches and exercises. Discussed importance of wearing shoes all of the time. Voltaren gel QID     2. Breast lesion  C/w seborrhoic keratosis but bothering patient - itching and pieces coming off. Referral to derm for evaluation.   - Derm Referral - In Network      The patient indicates understanding of these issues and agrees to the plan.      Piedad Whitley MD  5/21/2024  2:20 PM

## 2024-07-09 ENCOUNTER — TELEPHONE (OUTPATIENT)
Dept: FAMILY MEDICINE CLINIC | Facility: CLINIC | Age: 54
End: 2024-07-09

## 2024-07-09 DIAGNOSIS — M79.673 HEEL PAIN, UNSPECIFIED LATERALITY: Primary | ICD-10-CM

## 2024-07-09 NOTE — TELEPHONE ENCOUNTER
With  ID # [id number]    Patient states she saw  5/21/24 for heel pain and has done the exercises and voltaren gel and still having heel pain. Patient asking for the next steps, please advise.

## 2024-07-09 NOTE — TELEPHONE ENCOUNTER
Called patient verified full name and . Informed patient of referral. Patient verbalized understanding and did not have any further questions.

## 2024-07-11 RX ORDER — OMEPRAZOLE 20 MG/1
20 CAPSULE, DELAYED RELEASE ORAL
Qty: 90 CAPSULE | Refills: 3 | Status: SHIPPED | OUTPATIENT
Start: 2024-07-11

## 2024-07-11 NOTE — TELEPHONE ENCOUNTER
Refill Per Protocol     Requested Prescriptions   Pending Prescriptions Disp Refills    OMEPRAZOLE 20 MG Oral Capsule Delayed Release [Pharmacy Med Name: OMEPRAZOLE 20MG CAPSULES] 30 capsule 5     Sig: TAKE 1 CAPSULE(20 MG) BY MOUTH EVERY MORNING BEFORE BREAKFAST       Gastrointestional Medication Protocol Passed - 7/8/2024  7:57 PM        Passed - In person appointment or virtual visit in the past 12 mos or appointment in next 3 mos     Recent Outpatient Visits              1 month ago Pain of right heel    [facility] Piedad Alex MD    Office Visit    5 months ago Mastitis    [facility] Bogdan Taveras APRN    Office Visit    [facility]    1 year ago [facility] of [facility]    Office Visit    1 year ago Vaginal itching    [facility], [place] Gilliam DO    Office Visit          Future Appointments         Provider Department Appt Notes    In 1 month Shae Cobb DPM [facility] Heel pain                           Future Appointments         Provider Department Appt Notes    In 1 month Shae Cobb DPM [facility] Heel pain          Recent Outpatient Visits              1 month ago Pain of right heel    [facility], Piedad Alex MD    Office Visit    5 months ago Mastitis    [facility] Bogdan Taveras APRN    Office Visit    8 months ago Abnormal x-ray    [facility] Piedad Alex MD    Office Visit    1 year ago Chronic pain of right knee    [facility]  [facility] [place], [place] Jakub Smith MD    Office Visit    1 year ago Vaginal itching    [facility], [address], [place] Slade Sharp DO    Office Visit

## 2024-07-25 ENCOUNTER — HOSPITAL ENCOUNTER (OUTPATIENT)
Age: 54
Discharge: HOME | End: 2024-07-25
Payer: MEDICARE

## 2024-07-25 ENCOUNTER — APPOINTMENT (OUTPATIENT)
Dept: GENERAL RADIOLOGY | Age: 54
End: 2024-07-25
Attending: PHYSICIAN ASSISTANT
Payer: MEDICARE

## 2024-07-25 ENCOUNTER — NURSE TRIAGE (OUTPATIENT)
Dept: FAMILY MEDICINE CLINIC | Facility: CLINIC | Age: 54
End: 2024-07-25

## 2024-07-25 VITALS
DIASTOLIC BLOOD PRESSURE: 72 MMHG | SYSTOLIC BLOOD PRESSURE: 150 MMHG | OXYGEN SATURATION: 100 % | TEMPERATURE: 97 F | HEART RATE: 58 BPM | RESPIRATION RATE: 20 BRPM

## 2024-07-25 DIAGNOSIS — E87.6 HYPOKALEMIA: Primary | ICD-10-CM

## 2024-07-25 DIAGNOSIS — J45.20 MILD INTERMITTENT ASTHMATIC BRONCHITIS WITHOUT COMPLICATION (HCC): ICD-10-CM

## 2024-07-25 LAB
#MXD IC: 1.1 X10ˆ3/UL (ref 0.1–1)
ATRIAL RATE: 58 BPM
BUN BLD-MCNC: 10 MG/DL (ref 7–18)
CHLORIDE BLD-SCNC: 104 MMOL/L (ref 98–112)
CO2 BLD-SCNC: 27 MMOL/L (ref 21–32)
CREAT BLD-MCNC: 0.6 MG/DL
DDIMER WHOLE BLOOD: <200 NG/ML DDU (ref ?–400)
EGFRCR SERPLBLD CKD-EPI 2021: 108 ML/MIN/1.73M2 (ref 60–?)
GLUCOSE BLD-MCNC: 100 MG/DL (ref 70–99)
HCT VFR BLD AUTO: 42.7 %
HCT VFR BLD CALC: 47 %
HGB BLD-MCNC: 14 G/DL
ISTAT IONIZED CALCIUM FOR CHEM 8: 1.18 MMOL/L (ref 1.12–1.32)
LYMPHOCYTES # BLD AUTO: 1.7 X10ˆ3/UL (ref 1–4)
LYMPHOCYTES NFR BLD AUTO: 24.3 %
MCH RBC QN AUTO: 31 PG (ref 26–34)
MCHC RBC AUTO-ENTMCNC: 32.8 G/DL (ref 31–37)
MCV RBC AUTO: 94.5 FL (ref 80–100)
MIXED CELL %: 15.6 %
NEUTROPHILS # BLD AUTO: 4 X10ˆ3/UL (ref 1.5–7.7)
NEUTROPHILS NFR BLD AUTO: 60.1 %
P AXIS: 34 DEGREES
P-R INTERVAL: 162 MS
PLATELET # BLD AUTO: 267 X10ˆ3/UL (ref 150–450)
POTASSIUM BLD-SCNC: 3.5 MMOL/L (ref 3.6–5.1)
Q-T INTERVAL: 444 MS
QRS DURATION: 88 MS
QTC CALCULATION (BEZET): 435 MS
R AXIS: 49 DEGREES
RBC # BLD AUTO: 4.52 X10ˆ6/UL
SARS-COV-2 RNA RESP QL NAA+PROBE: NOT DETECTED
SODIUM BLD-SCNC: 142 MMOL/L (ref 136–145)
T AXIS: 56 DEGREES
TROPONIN I BLD-MCNC: <0.02 NG/ML
VENTRICULAR RATE: 58 BPM
WBC # BLD AUTO: 6.8 X10ˆ3/UL (ref 4–11)

## 2024-07-25 PROCEDURE — 99214 OFFICE O/P EST MOD 30 MIN: CPT | Performed by: PHYSICIAN ASSISTANT

## 2024-07-25 PROCEDURE — U0002 COVID-19 LAB TEST NON-CDC: HCPCS | Performed by: PHYSICIAN ASSISTANT

## 2024-07-25 PROCEDURE — 85025 COMPLETE CBC W/AUTO DIFF WBC: CPT | Performed by: PHYSICIAN ASSISTANT

## 2024-07-25 PROCEDURE — 80047 BASIC METABLC PNL IONIZED CA: CPT | Performed by: PHYSICIAN ASSISTANT

## 2024-07-25 PROCEDURE — 71046 X-RAY EXAM CHEST 2 VIEWS: CPT | Performed by: PHYSICIAN ASSISTANT

## 2024-07-25 PROCEDURE — 84484 ASSAY OF TROPONIN QUANT: CPT | Performed by: PHYSICIAN ASSISTANT

## 2024-07-25 PROCEDURE — 93000 ELECTROCARDIOGRAM COMPLETE: CPT | Performed by: PHYSICIAN ASSISTANT

## 2024-07-25 RX ORDER — POTASSIUM CHLORIDE 20 MEQ/1
20 TABLET, EXTENDED RELEASE ORAL ONCE
Status: COMPLETED | OUTPATIENT
Start: 2024-07-25 | End: 2024-07-25

## 2024-07-25 RX ORDER — PREDNISONE 20 MG/1
40 TABLET ORAL DAILY
Qty: 10 TABLET | Refills: 0 | Status: SHIPPED | OUTPATIENT
Start: 2024-07-25 | End: 2024-07-30

## 2024-07-25 NOTE — ED PROVIDER NOTES
Chief Complaint   Patient presents with    Shortness Of Breath       HPI:     Lauren Keating is a 53 year old female who presents for evaluation of shortness of breath since yesterday afternoon.  Patient notes periodic cough without associated nasal congestion.  History of asthma with baseline Advair with use overnight with mild improvement.  Notes intermittent chest discomfort with cough overnight, last episode this morning.  Cardiac risk factors: Age obesity.  Denies recent travel history of hypercoagulable state.  Notes mild intermittent dizziness without associated headache vision changes sore throat dysphagia nasal congestion neck pain active chest pain abdominal pain vomiting diarrhea dysuria hematuria hematochezia.       PFSH    PFSH asessment screens reviewed and agree.  Nurses notes reviewed I agree with documentation.    Family History   Problem Relation Age of Onset    No Known Problems Mother     No Known Problems Father     Diabetes Maternal Grandmother     Hypertension Maternal Grandmother     Hypertension Maternal Grandfather     Breast Cancer Neg     Ovarian Cancer Neg      Family history reviewed with patient/caregiver and is not pertinent to presenting problem.  Social History     Socioeconomic History    Marital status:      Spouse name: Not on file    Number of children: 2    Years of education: Not on file    Highest education level: Not on file   Occupational History    Occupation: shipping     Comment: disabled   Tobacco Use    Smoking status: Never    Smokeless tobacco: Never   Vaping Use    Vaping status: Never Used   Substance and Sexual Activity    Alcohol use: Never    Drug use: Never    Sexual activity: Not on file   Other Topics Concern     Service Not Asked    Blood Transfusions Not Asked    Caffeine Concern Yes     Comment: pop some time    Occupational Exposure Not Asked    Hobby Hazards Not Asked    Sleep Concern Not Asked    Stress Concern Not Asked    Weight Concern Not  Asked    Special Diet Not Asked    Back Care Not Asked    Exercise No    Bike Helmet Not Asked    Seat Belt Not Asked    Self-Exams Not Asked    Grew up on a farm Not Asked    History of tanning Not Asked    Outdoor occupation Not Asked    Pt has a pacemaker No    Pt has a defibrillator No    Breast feeding Not Asked    Reaction to local anesthetic No   Social History Narrative    The patient does not use an assistive device..      The patient does not live in a home with stairs.     Social Determinants of Health     Financial Resource Strain: Not on file   Food Insecurity: Not on file   Transportation Needs: Not on file   Physical Activity: Not on file   Stress: Not on file   Social Connections: Not on file   Housing Stability: Not on file         ROS:   Positive for stated complaint: chest pain, sob  All other systems reviewed and negative except as noted above.  Constitutional and Vital Signs Reviewed.      Physical Exam:     Findings:    /72   Pulse 58   Temp 97 °F (36.1 °C) (Temporal)   Resp 20   SpO2 100%   GENERAL: well developed, well nourished, well hydrated, no distress  SKIN: good skin turgor, no obvious rashes  NECK: No JVD.  No emphysema.  Supple, no adenopathy  CARDIO: RRR without murmur  EXTREMITIES: no pitting edema b/l LE.  normal Estelita test.  CHOPRA without difficulty  GI: soft, non-tender, normal bowel sounds  HEAD: normocephalic, atraumatic  EYES: sclera non icteric bilateral, conjunctiva clear  EARS: TMs clear bilaterally. Canals clear.  NOSE: No rhinorrhea.  MMM.  Nasal turbinates: pink, normal mucosa  THROAT: clear, without exudates, uvula midline, and airway patent  LUNGS: No retractions.  Clear to auscultation bilaterally; no rales, rhonchi, or wheezes  NEURO: No focal deficits  PSYCH: Alert and oriented x3.  Answering questions appropriately.  Mood appropriate.    MDM/Assessment/Plan:   Orders for this encounter:    Orders Placed This Encounter    XR CHEST PA + LAT CHEST (CPT=71046)      Order Specific Question:   What is the Relevant Clinical Indication / Reason for Exam?     Answer:   Shortness of Breath     Order Specific Question:   Release to patient     Answer:   Immediate    D-Dimer,Triage     Order Specific Question:   Release to patient     Answer:   Immediate    POCT CBC     Order Specific Question:   Release to patient     Answer:   Immediate    EKG 12 Lead     Order Specific Question:   Release to patient     Answer:   Immediate    POCT ISTAT chem8 cartridge    ISTAT Troponin    Rapid SARS-CoV-2 by PCR     Order Specific Question:   Release to patient     Answer:   Immediate    iStat (Chem 8)    iStat (Troponin)    potassium chloride (Klor-Con M20) tab 20 mEq    predniSONE 20 MG Oral Tab     Sig: Take 2 tablets (40 mg total) by mouth daily for 5 days.     Dispense:  10 tablet     Refill:  0       Labs performed this visit:  Recent Results (from the past 10 hour(s))   D-Dimer,Triage    Collection Time: 07/25/24 10:16 AM   Result Value Ref Range    D-Dimer DDU <200 <400 ng/mL DDU   POCT CBC    Collection Time: 07/25/24 10:16 AM   Result Value Ref Range    WBC IC 6.8 4.0 - 11.0 x10ˆ3/uL    RBC IC 4.52 3.80 - 5.30 X10ˆ6/uL    HGB IC 14.0 12.0 - 16.0 g/dL    HCT IC 42.7 35.0 - 48.0 %    MCV IC 94.5 80.0 - 100.0 fL    MCH IC 31.0 26.0 - 34.0 pg    MCHC IC 32.8 31.0 - 37.0 g/dL    PLT .0 150.0 - 450.0 X10ˆ3/uL    # Neutrophil 4.0 1.5 - 7.7 X10ˆ3/uL    # Lymphocyte 1.7 1.0 - 4.0 X10ˆ3/uL    # Mixed Cells 1.1 (H) 0.1 - 1.0 X10ˆ3/uL    Neutrophil % 60.1 %    Lymphocyte % 24.3 %    Mixed Cell % 15.6 %   Rapid SARS-CoV-2 by PCR    Collection Time: 07/25/24 10:16 AM    Specimen: Nares; Other   Result Value Ref Range    Rapid SARS-CoV-2 by PCR Not Detected Not Detected   EKG 12 Lead    Collection Time: 07/25/24 10:18 AM   Result Value Ref Range    Ventricular rate 58 BPM    Atrial rate 58 BPM    P-R Interval 162 ms    QRS Duration 88 ms    Q-T Interval 444 ms    QTC Calculation (Bezet) 435  ms    P Axis 34 degrees    R Axis 49 degrees    T Axis 56 degrees   POCT ISTAT chem8 cartridge    Collection Time: 07/25/24 10:38 AM   Result Value Ref Range    ISTAT Sodium 142 136 - 145 mmol/L    ISTAT BUN 10 7 - 18 mg/dL    ISTAT Potassium 3.5 (L) 3.6 - 5.1 mmol/L    ISTAT Chloride 104 98 - 112 mmol/L    ISTAT Ionized Calcium 1.18 1.12 - 1.32 mmol/L    ISTAT Hematocrit 47 34 - 50 %    ISTAT Glucose 100 (H) 70 - 99 mg/dL    ISTAT TCO2 27 21 - 32 mmol/L    ISTAT Creatinine 0.60 0.55 - 1.02 mg/dL    eGFR-Cr 108 >=60 mL/min/1.73m2   ISTAT Troponin    Collection Time: 07/25/24 10:48 AM   Result Value Ref Range    ISTAT Troponin <0.02 <0.045 ng/mL ng/mL       MDM:  EKG 58 bpm normal sinus rhythm no ST elevation or depression with a .     Metabolic profile shows a slightly low potassium at 3.5, oral supplemented prior to disposition.  Instructed on limitation of our facility including inability to check a serum magnesium level agrees to readdress as outpatient with primary.  Provided lung specialist for further evaluation for clinical concerns of progressive asthma.  Patient not wheezing or retracting during my encounter here we will put patient on corticosteroids for support of bronchitis inflammatory versus antibiotics.  Coronavirus screen negative, cardiac profile within normal limits, D-dimer negative, heart score: 2.  Educated on home care follow-up as well as indications to go to the ER, alert and NON toxic appearing. Dr Valles notified.     Diagnosis:    ICD-10-CM    1. Hypokalemia  E87.6       2. Mild intermittent asthmatic bronchitis without complication (HCC)  J45.20           All results reviewed and discussed with patient.  See AVS for detailed discharge instructions for your condition today.    Follow Up with:  Piedad Whitley MD  [address] [place] [id number]  [phone]    Schedule an appointment as soon as possible for a visit in 1 week  follow up; GO TO ER FOR  BREAKTHROUGH CHANGES AS INSTRUCTED.    Lance Bain MD  [address] [address]  [phone]    Schedule an appointment as soon as possible for a visit in 2 weeks  LUNG REFERRAL

## 2024-07-25 NOTE — TELEPHONE ENCOUNTER
Action Requested: Summary for Provider     []  Critical Lab, Recommendations Needed  [] Need Additional Advice  []   FYI    []   Need Orders  [] Need Medications Sent to Pharmacy  []  Other     SUMMARY: Patient reports symptoms started yesterday, feeling shortness of breath, has history of asthma and COPD, states used her medication twice but feels fatigued when she is talking or exerting herself.  Denies eugene chest pain, arm or jaw pain.  Denies wheezing.  Reports today coughing up green mucous.  Denies household contacts with illness.  Has not taken a home Covid test.  Plan:  No office visits available, advised her to go to [facility], please don face mask upon entry to Clinic.  Patient stated agreement.    Reason for call: Acute feels shortness of breath  Onset: yesterday    Language [place], [place] [id number], [place]  Spoke with patient,  verified.   No audible wheeze or dyspnea noted while patient speaking.    Reason for Disposition   MILD difficulty breathing (e.g., minimal/no SOB at rest, SOB with walking, pulse < 100) of new-onset or worse than normal    Protocols used: Breathing Difficulty-A-OH

## 2024-07-25 NOTE — DISCHARGE INSTRUCTIONS
Continue ADVAIR as prescribed. READDRESS OUTPATIENT. GO TO ER FOR BREAKTHROUGH CONCERNS/CHANGES BY INSTRUCTION.

## 2024-07-25 NOTE — ED INITIAL ASSESSMENT (HPI)
Pt presents with report of shortness of breath x 24 hours.     \"Feels like my asthma, I did use my inhaler yesterday\", per pt.     Minimal congestion with coughing up phlegm.

## 2024-07-31 ENCOUNTER — TELEPHONE (OUTPATIENT)
Dept: FAMILY MEDICINE CLINIC | Facility: CLINIC | Age: 54
End: 2024-07-31

## 2024-07-31 NOTE — TELEPHONE ENCOUNTER
Patient would like to schedule a follow up appointment after urgent care.  Patient states she had bronchitis. Symptoms improved, but would like to follow up.  Appointment scheduled with Dr. Gould.    Future Appointments   Date Time Provider Department Center   8/1/2024  2:00 PM Kev Gould MD [facility]   8/19/2024 10:40 AM Shae Cobb DPM [facility]

## 2024-08-01 ENCOUNTER — OFFICE VISIT (OUTPATIENT)
Dept: FAMILY MEDICINE CLINIC | Facility: CLINIC | Age: 54
End: 2024-08-01

## 2024-08-01 VITALS
BODY MASS INDEX: 30.86 KG/M2 | HEART RATE: 59 BPM | SYSTOLIC BLOOD PRESSURE: 119 MMHG | HEIGHT: 67 IN | DIASTOLIC BLOOD PRESSURE: 75 MMHG | WEIGHT: 196.63 LBS

## 2024-08-01 DIAGNOSIS — R42 VERTIGO: Primary | ICD-10-CM

## 2024-08-01 PROCEDURE — 99213 OFFICE O/P EST LOW 20 MIN: CPT | Performed by: FAMILY MEDICINE

## 2024-08-01 RX ORDER — ALBUTEROL SULFATE 90 UG/1
2 AEROSOL, METERED RESPIRATORY (INHALATION) EVERY 6 HOURS PRN
Qty: 18 G | Refills: 3 | Status: SHIPPED | OUTPATIENT
Start: 2024-08-01

## 2024-08-01 RX ORDER — FLUTICASONE PROPIONATE AND SALMETEROL 50; 500 UG/1; UG/1
1 POWDER RESPIRATORY (INHALATION) 2 TIMES DAILY
Qty: 60 EACH | Refills: 0 | Status: SHIPPED | OUTPATIENT
Start: 2024-08-01

## 2024-08-01 RX ORDER — MECLIZINE HCL 12.5 MG/1
12.5 TABLET ORAL 3 TIMES DAILY PRN
Qty: 40 TABLET | Refills: 0 | Status: SHIPPED | OUTPATIENT
Start: 2024-08-01

## 2024-08-01 NOTE — PROGRESS NOTES
8/1/2024  2:25 PM    Lauren Keating is a 53 year old female.    Chief complaint(s):   Chief Complaint   Patient presents with    Urgent Care F/u     7/25      HPI:     Lauren Keating primary complaint is regarding dizziness.     Patient is a 54-year-old female who was recently evaluated at the urgent care center for dizziness and shortness of breath.  Workup done was essentially negative found to have a potassium of 3.5 and was given some supplement oral tablets.  She is not taking them.  She reports that her all symptoms improved except for some dizziness.  There has been no nausea or vomiting.  Reports slight headaches.      HISTORY:  Past Medical History:    Asthma (HCC)    Back problem    Degenerative joint disease (DJD) of lumbar spine    Deviated septum    History of blood transfusion    many years ago, no reactions    Hypertrophy of both inferior nasal turbinates    Peptic ulcer disease    Uterine fibroid    Vitamin D deficiency      Past Surgical History:   Procedure Laterality Date    Cholecystectomy      Cyst aspiration left      Exc skin benig 2.1-3cm face,facial Right 10/14/2019    Excision turbinate,submucous  01/10/2022    Hc puncture aspiration breast cyst Left 10/12/2018    Hysterectomy      Laminec/facetect/foramin,lumbar  08/21/2018    and discectomy    Laparoscopic cholecystectomy  2003    Needle biopsy left  02/15/2024    US guided    Repair of nasal septum  01/10/2022    Repr cmpl wnd head,fac,hand 2.6-7.5 Right 10/14/2019    Spine surgery procedure unlisted      Total abdom hysterectomy  2000    Total abdominal hysterectomy  2004    unilateral salpingoophorectomy, postop bleeding req surgical exploration      Family History   Problem Relation Age of Onset    No Known Problems Mother     No Known Problems Father     Diabetes Maternal Grandmother     Hypertension Maternal Grandmother     Hypertension Maternal Grandfather     Breast Cancer Neg     Ovarian Cancer Neg       Social History:   Social History      Socioeconomic History    Marital status:     Number of children: 2   Occupational History    Occupation: shipping     Comment: disabled   Tobacco Use    Smoking status: Never    Smokeless tobacco: Never   Vaping Use    Vaping status: Never Used   Substance and Sexual Activity    Alcohol use: Never    Drug use: Never   Other Topics Concern    Caffeine Concern Yes     Comment: pop some time    Exercise No    Pt has a pacemaker No    Pt has a defibrillator No    Reaction to local anesthetic No   Social History Narrative    The patient does not use an assistive device..      The patient does not live in a home with stairs.        Immunizations:   Immunization History   Administered Date(s) Administered    Covid-19 Vaccine Pfizer 30 mcg/0.3 ml 03/27/2021, 04/17/2021, 01/20/2022    FLULAVAL 6 months & older 0.5 ml Prefilled syringe (23511) 10/17/2017, 10/08/2020    FLUZONE 6 months and older PFS 0.5 ml (25204) 10/18/2014, 09/20/2021, 10/26/2023    Pneumococcal Conjugate PCV20 10/26/2023       Medications (Active prior to today's visit):  Current Outpatient Medications   Medication Sig Dispense Refill    meclizine 12.5 MG Oral Tab Take 1 tablet (12.5 mg total) by mouth 3 (three) times daily as needed. 40 tablet 0    ADVAIR DISKUS 500-50 MCG/ACT Inhalation Aerosol Powder, Breath Activated Inhale 1 puff into the lungs 2 (two) times daily. 60 each 0    albuterol 108 (90 Base) MCG/ACT Inhalation Aero Soln Inhale 2 puffs into the lungs every 6 (six) hours as needed for Wheezing. 18 g 3    omeprazole 20 MG Oral Capsule Delayed Release Take 1 capsule (20 mg total) by mouth before breakfast. 90 capsule 3    diclofenac (VOLTAREN) 1 % External Gel Apply 2 g topically 4 (four) times daily. 100 g 1    levocetirizine 5 MG Oral Tab Take 1 tablet (5 mg total) by mouth every evening. 90 tablet 3    montelukast 10 MG Oral Tab Take 1 tablet (10 mg total) by mouth nightly. (Patient not taking: Reported on 5/21/2024) 90 tablet 4     naproxen 500 MG Oral Tab Take 1 tablet (500 mg total) by mouth 2 (two) times daily with meals. (Patient not taking: Reported on 5/21/2024) 60 tablet 0    azelastine 0.1 % Nasal Solution 2 sprays by Nasal route daily. 1 each 4    traMADol 50 MG Oral Tab Take 1 tablet (50 mg total) by mouth daily as needed.      Cholecalciferol (VITAMIN D) 2000 units Oral Cap Take 1,000 capsules (2,000,000 Units total) by mouth daily.         Allergies:  No Known Allergies      ROS:   Review of Systems   Constitutional:  Negative for appetite change and fever.   Eyes:  Negative for visual disturbance.   Respiratory:  Negative for cough and shortness of breath.    Cardiovascular:  Negative for chest pain.   Gastrointestinal:  Negative for abdominal pain, nausea and vomiting.   Musculoskeletal:  Negative for back pain.   Skin:  Negative for rash.   Neurological:  Positive for dizziness and headaches.       PHYSICAL EXAM:   VS: /75   Pulse 59   Ht 5' 7\" (1.702 m)   Wt 196 lb 9.6 oz (89.2 kg)   BMI 30.79 kg/m²     Physical Exam  Vitals reviewed.   Constitutional:       General: She is not in acute distress.     Appearance: Normal appearance.   HENT:      Head: Normocephalic.      Right Ear: Tympanic membrane and ear canal normal.      Left Ear: Tympanic membrane and ear canal normal.   Eyes:      Conjunctiva/sclera: Conjunctivae normal.   Cardiovascular:      Rate and Rhythm: Normal rate.   Pulmonary:      Effort: Pulmonary effort is normal.   Musculoskeletal:      Cervical back: Neck supple.   Skin:     Findings: No rash.   Neurological:      Cranial Nerves: Cranial nerves 2-12 are intact.      Comments: No nystagmus   Psychiatric:         Mood and Affect: Mood normal.         LABORATORY RESULTS:      Results for orders placed or performed during the hospital encounter of 07/25/24   D-Dimer,Triage   Result Value Ref Range    D-Dimer DDU <200 <400 ng/mL DDU   POCT CBC   Result Value Ref Range    WBC IC 6.8 4.0 - 11.0 x10ˆ3/uL     RBC IC 4.52 3.80 - 5.30 X10ˆ6/uL    HGB IC 14.0 12.0 - 16.0 g/dL    HCT IC 42.7 35.0 - 48.0 %    MCV IC 94.5 80.0 - 100.0 fL    MCH IC 31.0 26.0 - 34.0 pg    MCHC IC 32.8 31.0 - 37.0 g/dL    PLT .0 150.0 - 450.0 X10ˆ3/uL    # Neutrophil 4.0 1.5 - 7.7 X10ˆ3/uL    # Lymphocyte 1.7 1.0 - 4.0 X10ˆ3/uL    # Mixed Cells 1.1 (H) 0.1 - 1.0 X10ˆ3/uL    Neutrophil % 60.1 %    Lymphocyte % 24.3 %    Mixed Cell % 15.6 %   EKG 12 Lead   Result Value Ref Range    Ventricular rate 58 BPM    Atrial rate 58 BPM    P-R Interval 162 ms    QRS Duration 88 ms    Q-T Interval 444 ms    QTC Calculation (Bezet) 435 ms    P Axis 34 degrees    R Axis 49 degrees    T Axis 56 degrees   POCT ISTAT chem8 cartridge   Result Value Ref Range    ISTAT Sodium 142 136 - 145 mmol/L    ISTAT BUN 10 7 - 18 mg/dL    ISTAT Potassium 3.5 (L) 3.6 - 5.1 mmol/L    ISTAT Chloride 104 98 - 112 mmol/L    ISTAT Ionized Calcium 1.18 1.12 - 1.32 mmol/L    ISTAT Hematocrit 47 34 - 50 %    ISTAT Glucose 100 (H) 70 - 99 mg/dL    ISTAT TCO2 27 21 - 32 mmol/L    ISTAT Creatinine 0.60 0.55 - 1.02 mg/dL    eGFR-Cr 108 >=60 mL/min/1.73m2   ISTAT Troponin   Result Value Ref Range    ISTAT Troponin <0.02 <0.045 ng/mL ng/mL   Rapid SARS-CoV-2 by PCR    Specimen: Nares; Other   Result Value Ref Range    Rapid SARS-CoV-2 by PCR Not Detected Not Detected       EKG / Spirometry : -     Radiology: XR CHEST PA + LAT CHEST (CPT=71046)    Result Date: 7/25/2024  PROCEDURE: XR CHEST PA + LAT CHEST (CPT=71046)  COMPARISON: [facility], XR CHEST PA + LAT CHEST (CPT=71046), 2/20/2024, 2:49 PM.  INDICATIONS: Shortness of breath for 1 day. History of asthma.  TECHNIQUE:   Two views.   FINDINGS: CARDIAC/VASC: No cardiac silhouette abnormality or cardiomegaly.  Unremarkable pulmonary vasculature.  MEDIAST/DELMAR: Atherosclerotic aorta with no visible aneurysm.  LUNGS/PLEURA: No significant pulmonary parenchymal abnormalities.  No effusion or pleural thickening.   BONES: Scattered mild degenerative endplate changes in the visualized thoracolumbar spine. OTHER: There are surgical clips in the right upper quadrant of the abdomen.         CONCLUSION: No acute cardiopulmonary abnormality.    Dictated by (CST): Rishabh Carrero MD on 7/25/2024 at 10:31 AM     Finalized by (CST): Rishabh Carrero MD on 7/25/2024 at 10:31 AM             ASSESSMENT/PLAN:   Assessment   Encounter Diagnosis   Name Primary?    Vertigo Yes       MEDICATIONS:   CPM  Refills:   Requested Prescriptions     Signed Prescriptions Disp Refills    meclizine 12.5 MG Oral Tab 40 tablet 0     Sig: Take 1 tablet (12.5 mg total) by mouth 3 (three) times daily as needed.    ADVAIR DISKUS 500-50 MCG/ACT Inhalation Aerosol Powder, Breath Activated 60 each 0     Sig: Inhale 1 puff into the lungs 2 (two) times daily.    albuterol 108 (90 Base) MCG/ACT Inhalation Aero Soln 18 g 3     Sig: Inhale 2 puffs into the lungs every 6 (six) hours as needed for Wheezing.          RECOMMENDATIONS given include: Patient was reassured of  her medical condition and all questions and concerns were answered. Patient was informed to please, call our office with any new or further questions or concerns that may come up in the near future. Notify Dr Gould or the [facility] if there is a deterioration or worsening of the medical condition. Also, inform the doctor with any new symptoms or medications' side effects.  Increase fluid intake, avoid caffeine intake, avoid sudden head movement.     FOLLOW-UP: Schedule a follow-up visit in  prn.            Orders This Visit:  No orders of the defined types were placed in this encounter.      Meds This Visit:  Requested Prescriptions     Signed Prescriptions Disp Refills    meclizine 12.5 MG Oral Tab 40 tablet 0     Sig: Take 1 tablet (12.5 mg total) by mouth 3 (three) times daily as needed.    ADVAIR DISKUS 500-50 MCG/ACT Inhalation Aerosol Powder, Breath Activated 60 each 0     Sig: Inhale 1 puff  into the lungs 2 (two) times daily.    albuterol 108 (90 Base) MCG/ACT Inhalation Aero Soln 18 g 3     Sig: Inhale 2 puffs into the lungs every 6 (six) hours as needed for Wheezing.       Imaging & Referrals:  None         JOSE GILLIAM MD

## 2024-08-19 ENCOUNTER — OFFICE VISIT (OUTPATIENT)
Dept: PODIATRY CLINIC | Facility: CLINIC | Age: 54
End: 2024-08-19
Payer: MEDICARE

## 2024-08-19 VITALS — SYSTOLIC BLOOD PRESSURE: 126 MMHG | HEART RATE: 60 BPM | DIASTOLIC BLOOD PRESSURE: 76 MMHG

## 2024-08-19 DIAGNOSIS — M72.2 PLANTAR FASCIITIS: Primary | ICD-10-CM

## 2024-08-19 RX ORDER — DEXAMETHASONE SODIUM PHOSPHATE 4 MG/ML
4 VIAL (ML) INJECTION ONCE
Status: COMPLETED | OUTPATIENT
Start: 2024-08-19 | End: 2024-08-19

## 2024-08-19 RX ORDER — TRIAMCINOLONE ACETONIDE 40 MG/ML
40 INJECTION, SUSPENSION INTRA-ARTICULAR; INTRAMUSCULAR ONCE
Status: COMPLETED | OUTPATIENT
Start: 2024-08-19 | End: 2024-08-19

## 2024-08-19 RX ADMIN — TRIAMCINOLONE ACETONIDE 40 MG: 40 INJECTION, SUSPENSION INTRA-ARTICULAR; INTRAMUSCULAR at 11:10:00

## 2024-08-19 RX ADMIN — DEXAMETHASONE SODIUM PHOSPHATE 4 MG: 4 MG/ML VIAL (ML) INJECTION at 11:10:00

## 2024-08-19 NOTE — PROGRESS NOTES
With Dr. Lutz 3 months  SOMEONE ELSE [facility] Podiatry  Progress Note      Lauren Keating is a 53 year old female.   Chief Complaint   Patient presents with    Heel Pain     Consult right heel pain 8/10. Onset 4 months ago, no injury.             HPI:     Patient is a 53-year-old female who presents to clinic for evaluation of right plantar heel pain.  Admits that the pain started about 4 months ago.  Denies any injuries or trauma.  Today she rates her pain an 8 out of 10.        Allergies: Patient has no known allergies.    Current Outpatient Medications   Medication Sig Dispense Refill    meclizine 12.5 MG Oral Tab Take 1 tablet (12.5 mg total) by mouth 3 (three) times daily as needed. 40 tablet 0    ADVAIR DISKUS 500-50 MCG/ACT Inhalation Aerosol Powder, Breath Activated Inhale 1 puff into the lungs 2 (two) times daily. 60 each 0    albuterol 108 (90 Base) MCG/ACT Inhalation Aero Soln Inhale 2 puffs into the lungs every 6 (six) hours as needed for Wheezing. 18 g 3    omeprazole 20 MG Oral Capsule Delayed Release Take 1 capsule (20 mg total) by mouth before breakfast. 90 capsule 3    diclofenac (VOLTAREN) 1 % External Gel Apply 2 g topically 4 (four) times daily. 100 g 1    levocetirizine 5 MG Oral Tab Take 1 tablet (5 mg total) by mouth every evening. 90 tablet 3    montelukast 10 MG Oral Tab Take 1 tablet (10 mg total) by mouth nightly. 90 tablet 4    naproxen 500 MG Oral Tab Take 1 tablet (500 mg total) by mouth 2 (two) times daily with meals. 60 tablet 0    azelastine 0.1 % Nasal Solution 2 sprays by Nasal route daily. 1 each 4    traMADol 50 MG Oral Tab Take 1 tablet (50 mg total) by mouth daily as needed.      Cholecalciferol (VITAMIN D) 2000 units Oral Cap Take 1,000 capsules (2,000,000 Units total) by mouth daily.        Past Medical History:    Asthma (HCC)    Back problem    Degenerative joint disease (DJD) of lumbar spine    Deviated septum    History of blood transfusion    many years ago, no  reactions    Hypertrophy of both inferior nasal turbinates    Peptic ulcer disease    Uterine fibroid    Vitamin D deficiency      Past Surgical History:   Procedure Laterality Date    Cholecystectomy      Cyst aspiration left      Exc skin benig 2.1-3cm face,facial Right 10/14/2019    Excision turbinate,submucous  01/10/2022    Hc puncture aspiration breast cyst Left 10/12/2018    Hysterectomy      Laminec/facetect/foramin,lumbar  08/21/2018    and discectomy    Laparoscopic cholecystectomy  2003    Needle biopsy left  02/15/2024    US guided    Repair of nasal septum  01/10/2022    Repr cmpl wnd head,fac,hand 2.6-7.5 Right 10/14/2019    Spine surgery procedure unlisted      Total abdom hysterectomy  2000    Total abdominal hysterectomy  2004    unilateral salpingoophorectomy, postop bleeding req surgical exploration      Family History   Problem Relation Age of Onset    No Known Problems Mother     No Known Problems Father     Diabetes Maternal Grandmother     Hypertension Maternal Grandmother     Hypertension Maternal Grandfather     Breast Cancer Neg     Ovarian Cancer Neg       Social History     Socioeconomic History    Marital status:     Number of children: 2   Occupational History    Occupation: shipping     Comment: disabled   Tobacco Use    Smoking status: Never    Smokeless tobacco: Never   Vaping Use    Vaping status: Never Used   Substance and Sexual Activity    Alcohol use: Never    Drug use: Never   Other Topics Concern    Caffeine Concern Yes     Comment: pop some time    Exercise No    Pt has a pacemaker No    Pt has a defibrillator No    Reaction to local anesthetic No           REVIEW OF SYSTEMS:     Denies nause, fever, chills  No calf pain  Denies chest pain or SOB      EXAM:   There were no vitals taken for this visit.  GENERAL: well developed, well nourished, in no apparent distress  EXTREMITIES:   1. Integument: Normal skin temperature and turgor  2. Vascular: Dorsalis pedis two out  of four bilateral and posterior tibial pulses two out of   four bilateral, capillary refill normal.   3. Musculoskeletal: All muscle groups are graded 5 out of 5 in the foot and ankle.  Pain with palpation to right medial calcaneal tubercle   4. Neurological: Normal sharp dull sensation; reflexes normal.             ASSESSMENT AND PLAN:   Diagnoses and all orders for this visit:    Plantar fasciitis  -     Physical Therapy Referral - [facility]        Plan:     -Patient examined, chart history reviewed.  -Discussed etiology of patient's condition and various treatment options.  -Discussed importance of proper shoe gear and orthotics.  Patient to avoid walking barefoot at all times.  -Discussed importance of stretching exercises.  Patient to aim to stretch 3-5 times daily.  -Discussed steroid injection with patient including benefits and risks.  Patient agrees to injection and written consent was obtained.  -After prepping site with alcohol, administered steroid injection to right plantar heel consisting of 1 cc of 1% lidocaine plain, 1 cc of dexamethasone, and  1 cc of Kenalog 40.  Patient tolerated the injection well and there were no complications.  Site was dressed with Band-Aid.  -Wear supportive shoe gear and inserts at all times with ambulation.  Avoid walking barefoot.  - Perform stretching exercises 3-5 times daily.  Instructions dispensed.  - Monitor response to steroid injection.  - Follow-up in 6  months for reevaluation.      The patient indicates understanding of these issues and agrees to the plan.        Shae Cobb DPM

## 2024-08-19 NOTE — PROGRESS NOTES
Verbal order per Dr Noble, draw up 1ml of 1% Lidiocaine, 1ml of Dexamethasone Sodium Phosphate and 1 ml of Kenalog 40, for a right heel injection.

## 2024-09-19 DIAGNOSIS — J45.20 MILD INTERMITTENT ASTHMA WITHOUT COMPLICATION (HCC): ICD-10-CM

## 2024-09-19 DIAGNOSIS — J30.9 ALLERGIC RHINITIS, UNSPECIFIED SEASONALITY, UNSPECIFIED TRIGGER: ICD-10-CM

## 2024-09-25 RX ORDER — MONTELUKAST SODIUM 10 MG/1
10 TABLET ORAL NIGHTLY
Qty: 90 TABLET | Refills: 3 | Status: SHIPPED | OUTPATIENT
Start: 2024-09-25

## 2024-09-25 NOTE — TELEPHONE ENCOUNTER
Please Review. Protocol Failed; No Protocol     Requested Prescriptions   Pending Prescriptions Disp Refills    MONTELUKAST 10 MG Oral Tab [Pharmacy Med Name: MONTELUKAST 10MG TABLETS] 90 tablet 4     Sig: TAKE 1 TABLET(10 MG) BY MOUTH EVERY NIGHT       Asthma & COPD Medication Protocol Failed - 9/19/2024  2:14 PM        Failed - Asthma Action Score greater than or equal to 20        Failed - AAP/ACT given in last 12 months     No data recorded  No data recorded  No data recorded  No data recorded          Passed - Appointment in past 6 or next 3 months      Recent Outpatient Visits              1 month ago Plantar fasciitis    [facility], Shae Hayden DPM    Office Visit    1 month ago Vertigo    [facility], Kev Dewitt MD    Office Visit    4 months ago Pain of right heel    [facility], Piedad Alex MD    Office Visit    8 months ago Mastitis    [facility], Bogdan Taveras APRN    Office Visit    11 months ago Abnormal x-ray    [facility], Piedad Alex MD    Office Visit                               Recent Outpatient Visits              1 month ago Plantar fasciitis    [facility], Shae Hayden DPM    Office Visit    1 month ago Vertigo    [facility], Kev Dewitt MD    Office Visit    4 months ago Pain of right heel    [facility], Piedad Alex MD    Office Visit    8 months ago Mastitis    [facility], Bogdan Taveras APRN    Office Visit    11 months ago [facility] x[facility]    [facility]

## 2024-10-25 ENCOUNTER — APPOINTMENT (OUTPATIENT)
Dept: PHYSICAL THERAPY | Facility: HOSPITAL | Age: 54
End: 2024-10-25
Attending: STUDENT IN AN ORGANIZED HEALTH CARE EDUCATION/TRAINING PROGRAM
Payer: MEDICARE

## 2024-10-31 ENCOUNTER — TELEPHONE (OUTPATIENT)
Dept: PHYSICAL THERAPY | Facility: HOSPITAL | Age: 54
End: 2024-10-31

## 2024-11-01 ENCOUNTER — OFFICE VISIT (OUTPATIENT)
Dept: PHYSICAL THERAPY | Facility: HOSPITAL | Age: 54
End: 2024-11-01
Attending: STUDENT IN AN ORGANIZED HEALTH CARE EDUCATION/TRAINING PROGRAM
Payer: MEDICARE

## 2024-11-01 DIAGNOSIS — M72.2 PLANTAR FASCIITIS: Primary | ICD-10-CM

## 2024-11-01 PROCEDURE — 97161 PT EVAL LOW COMPLEX 20 MIN: CPT

## 2024-11-01 PROCEDURE — 97110 THERAPEUTIC EXERCISES: CPT

## 2024-11-01 NOTE — PROGRESS NOTES
LOWER EXTREMITY EVALUATION:     Diagnosis:    R  Plantar fasciitis (M72.2)    Referring Provider: Shae Cobb  Date of Evaluation:    11/1/2024    Precautions:  None  Belarusian speaking Next MD visit:   none scheduled  Date of Surgery: n/a     PATIENT SUMMARY   Lauren Keating is a 54 year old female who presents to therapy today with complaints of  R foot pain that started 4 months ago. The pain is in the heel and wraps around. She had an injection on inside of foot 2 months ago and it did not help. She states she always has pain. She denies orthotics.  Pt describes pain level current 4/10 (at rest/seated), at best 4/10, at worst 8-9/10.   Current functional limitations include: walking, stairs  She is not working.     Lauren describes prior level of function : independent with no pain. Pt goals include: get rid of pain, walk better.  Past medical history was reviewed with Lauren. Significant findings include:  has a past medical history of Asthma ([facility]), Back problem, Degenerative joint disease (DJD) of lumbar spine, Deviated septum (01/10/2022), History of blood transfusion, Hypertrophy of both inferior nasal turbinates (01/10/2022), Peptic ulcer disease, Uterine fibroid, and Vitamin D deficiency.    She has no past medical history of Anesthesia complication, Diabetes ([facility]), Difficult intubation, High blood pressure, High cholesterol, motion sickness, Malignant hyperthermia, PONV (postoperative nausea and vomiting), Pseudocholinesterase deficiency, Sleep apnea, or Type 1 diabetes mellitus ([facility]).     ASSESSMENT  Lauren presents to physical therapy evaluation with primary c/o R heel pain that wraps around. The results of the objective tests and measures show decrease AROM R ankle, decreased talar glide and calcaneal mobility, decreased DF strength, decreased calf length.  Functional deficits include but are not limited to walking, stairs, Etc.  Signs and symptoms are consistent with diagnosis of plantar fasciitis.  Pt and PT discussed evaluation findings, pathology, POC and HEP.  Pt voiced understanding and performs HEP correctly without reported pain. Skilled Physical Therapy is medically necessary to address the above impairments and reach functional goals.     OBJECTIVE:   Observation: calcaneal varus  Palpation: painful to palpation distal to medial and lateral malleolus, calcaneal varus noted on R and L  Sensation: painful to palpation inferior to B malleoli    AROM: (* denotes performed with pain)  Foot/Ankle   DF: R 3 deg (prone knee bent); L WFL  PF: R WFL*; L WFL  INV: R 15deg* (inside); L WDL  EV: R 8 deg; L WFL     Accessory motion: limited all calcaneal mob of R foot, painful to palpate navicular and cuboid; hypomobile post glide talus    Flexibility:    Gastroc-soleus: R mod limited; L min limited    Strength/MMT: (* denotes performed with pain)  Hip Foot/Ankle   NT today DF: R 4+/5; L 5/5  PF: R 5/5; L 5/5  INV: R 5/5; L 5/5  EV: R 5/5; L 5/5  Great toe ext: R NT/5; L  NT/5  Great toe flex:5/5 B     Special tests:     4in from wall for B knee flexion to touch wall test.    Gait: pt ambulates on level ground with normal mechanics  Balance: SLS: R 13 sec (painful), L 20 sec    Today’s Treatment and Response:   Pt education was provided on exam findings, treatment diagnosis, treatment plan, expectations, and prognosis. Pt was also provided recommendations for activity modifications, possible soreness after evaluation, pain science education , detrimental fear avoidance behaviors, importance of remaining active, and shoe wear.  Patient was instructed in and issued a HEP for:     Access Code: [email or web address]  URL: [email or web address]  Date: 11/01/2024  Prepared by: Nano Barnes    Exercises  - Gastroc Stretch on Wall  - 1 x daily - 7 x weekly - 3 sets - 30 hold  - Long Sitting Ankle Dorsiflexion with Anchored Resistance  - 1 x daily - 7 x weekly - 2 sets - 10 reps  - Seated Arch Lifts  - 1 x daily - 7  x weekly - 2 sets - 10 reps  - Toe Yoga - Alternating Great Toe and Lesser Toe Extension  - 1 x daily - 7 x weekly - 2 sets - 10 reps    Charges: PT Eval Low Complexity, therex:1      Total Timed Treatment: 10 min     Total Treatment Time: 35 min     PLAN OF CARE:    Goals: (to be met in 8 visits)  Pt will demonstrate improved DF AROM to >= 8 degrees to promote proper foot clearance during gait and greater ease descending stairs without compensation  Pt will have increased ankle strength to 5/5 throughout for improved ankle control with ADLs such as prolonged gait and stair negotiation.  Pt will have improved R SLS to >18s for increased ankle stability with ambulation on uneven surfaces such as gravel and grass   Pt will report <3/10 pain with work and home activities such as walking, stairs.   Pt will be independent and compliant with comprehensive HEP to maintain progress achieved in PT     Frequency / Duration: Patient will be seen for 1-2 x/week or a total of 8 visits over a 30 day period. Treatment will include: Gait training and Manual Therapy therex, neuro julia    Education or treatment limitation: None  Rehab Potential:good    LEFS Score  LEFS Score: (Patient-Rptd) 20 % (11/1/2024  8:26 AM)      Patient/Family/Caregiver was advised of these findings, precautions, and treatment options and has agreed to actively participate in planning and for this course of care.    Thank you for your referral. Please co-sign or sign and return this letter via fax as soon as possible to [phone]. If you have any questions, please contact me at Dept: [phone]    Sincerely,  Electronically signed by therapist: Nano Barnes, PT, DPT  Physician's certification required: Yes  I certify the need for these services furnished under this plan of treatment and while under my care.    X___________________________________________________ Date____________________    Certification From: 11/1/2024  To:1/30/2025

## 2024-11-04 DIAGNOSIS — R42 VERTIGO: ICD-10-CM

## 2024-11-07 ENCOUNTER — TELEPHONE (OUTPATIENT)
Dept: PHYSICAL THERAPY | Facility: HOSPITAL | Age: 54
End: 2024-11-07

## 2024-11-07 RX ORDER — ALBUTEROL SULFATE 90 UG/1
2 INHALANT RESPIRATORY (INHALATION) EVERY 6 HOURS PRN
Qty: 25.5 G | Refills: 1 | Status: SHIPPED | OUTPATIENT
Start: 2024-11-07

## 2024-11-07 NOTE — TELEPHONE ENCOUNTER
Please review; protocol failed/No Protocol    Did not attach ACT for protocol-Please advise.     Requested Prescriptions   Pending Prescriptions Disp Refills    ALBUTEROL 108 (90 Base) MCG/ACT Inhalation Aero Soln [Pharmacy Med Name: ALBUTEROL HFA INH (200 PUFFS) 8.5GM] 8.5 g 0     Sig: INHALE 2 PUFFS INTO THE LUNGS EVERY 6 HOURS AS NEEDED FOR WHEEZING       Asthma & COPD Medication Protocol Passed - 11/7/2024  2:36 PM        Passed - Appointment in past 6 or next 3 months      Recent Outpatient Visits              6 days ago [facility]    [facility]    Office Visit    2 months ago Plantar fasciitis    [facility], Shae Hayden DPM    Office Visit    3 months ago Vertigo    [facility], Kev Dewitt MD    Office Visit    5 months ago Pain of right heel    [facility], [place]    Office Visit    9 months ago Mastitis    [facility], Bogdan Taveras APRN    Office Visit          Future Appointments         Provider Department Appt Notes    Tomorrow RichardRita alexandrearet, [facility] Need insur verify  [employer] mc    In 6 days Brunner, Heather, [facility] [employer] mc    [facility] [employer] [facility]    [facility] [employer]                        Future Appointments         Provider Department Appt Notes    [facility] Need insur verify  [employer] mc    In 6 days Brunner, Heather, [facility] [employer] mc    [facility] 2 [facility] [employer] [facility]    In 3 weeks [facility] [employer] [facility]          Recent  Outpatient Visits              6 days ago Plantar fasciitis    [facility] Nano Barnes, NARESH    Office Visit    2 months ago Plantar fasciitis    [facility], Shae Hayden DPM    Office Visit    3 months ago Vertigo    [facility], Kev Dewitt MD    Office Visit    5 months ago Pain of right heel    [facility], Piedad Alex MD    Office Visit    9 months ago Mastitis    [facility], Bogdan Taveras APRN    Office Visit

## 2024-11-08 ENCOUNTER — OFFICE VISIT (OUTPATIENT)
Dept: PHYSICAL THERAPY | Facility: HOSPITAL | Age: 54
End: 2024-11-08
Attending: STUDENT IN AN ORGANIZED HEALTH CARE EDUCATION/TRAINING PROGRAM
Payer: MEDICARE

## 2024-11-08 PROCEDURE — 97140 MANUAL THERAPY 1/> REGIONS: CPT

## 2024-11-08 PROCEDURE — 97110 THERAPEUTIC EXERCISES: CPT

## 2024-11-13 ENCOUNTER — OFFICE VISIT (OUTPATIENT)
Dept: PHYSICAL THERAPY | Facility: HOSPITAL | Age: 54
End: 2024-11-13
Attending: STUDENT IN AN ORGANIZED HEALTH CARE EDUCATION/TRAINING PROGRAM
Payer: MEDICARE

## 2024-11-13 PROCEDURE — 97140 MANUAL THERAPY 1/> REGIONS: CPT

## 2024-11-13 PROCEDURE — 97110 THERAPEUTIC EXERCISES: CPT

## 2024-11-13 NOTE — PROGRESS NOTES
Diagnosis:       R  Plantar fasciitis (M72.2)    Referring Provider: Shae Cobb  Date of Evaluation:    11/01/2024    Precautions:  None Next MD visit:   none scheduled  Date of Surgery: n/a   Insurance Primary/Secondary: [employer] MCR / MEDICAID     # Auth Visits: 8 per POC [employer]            Subjective: Some pain with HEP. Reports most pain with walking. Has pain at her lateral foot, sometimes pain in lateral lower leg.    Pain: 7/10      Objective:   TTP: peroneal tendon, mid calf, soleus  -------------------  AROM: (* denotes performed with pain)  Foot/Ankle   DF: R 3 deg (prone knee bent); L WFL  PF: R WFL*; L WFL  INV: R 15deg* (inside); L WDL  EV: R 8 deg; L WFL      Accessory motion: limited all calcaneal mob of R foot, painful to palpate navicular and cuboid; hypomobile post glide talus     Flexibility:     Gastroc-soleus: R mod limited; L min limited     Strength/MMT: (* denotes performed with pain)  Hip Foot/Ankle   NT today DF: R 4+/5; L 5/5  PF: R 5/5; L 5/5  INV: R 5/5; L 5/5  EV: R 5/5; L 5/5  Great toe ext: R NT/5; L  NT/5  Great toe flex:5/5 B       Assessment: Possible lumbar involvement with R lateral foot pain; benefit from PPU and peroneal nn glides. Significant tightness in calf; addressed with manual intervention and stretches. Continued work on posterior TC glide for DF.     Goals:   (to be met in 8 visits)  Pt will demonstrate improved DF AROM to >= 8 degrees to promote proper foot clearance during gait and greater ease descending stairs without compensation  Pt will have increased ankle strength to 5/5 throughout for improved ankle control with ADLs such as prolonged gait and stair negotiation.  Pt will have improved R SLS to >18s for increased ankle stability with ambulation on uneven surfaces such as gravel and grass   Pt will report <3/10 pain with work and home activities such as walking, stairs.   Pt will be independent and compliant with comprehensive HEP to maintain progress  achieved in PT     Plan: Progress tolerance to loaded positions.   Date: 11/8/2024  TX#: 2/8 Date:  11/13/24               TX#: 3/8 Date:                 TX#: 4/ Date:                 TX#: 5/ Date:   Tx#: 6/   Therex:  3x20\" slantboard stretch  2x10 eccentric calf raises on the R from step with HHA  10x towel curls with toe flexion, R  2x10 ankle eversion, blue t band  15 toe yoga  10 arch lifts, 5\" Hold  2x10  big toe/toe flexion followed by pF into red t band in long sitting. TE: 23 mins  - PPU - no change  - calf stretch R  - peroneal nn   - s/l SLR abd, 2x10 R - triggers pain in lateral leg and foot  - PPU - relief  - wedge calf stretch 2x1'  - lateral stepping, x2 laps  - review HEP      Manual (20 min):  Pt in supine for GR III post talar mob followed by DF (improvements noted at end)  Prone for knee bent medial calcaneal glide GR II-III and soleus stretch  IASTM to plantar fascia and achilles MT: 15 mins  - STM /trigger pt release peroneal, gastroc, soleus  - CFM peroneal tendon  - TC jt mobs and distraction      -       HEP: Access Code: [email or web address]  URL: [email or web address]  Date: 11/01/2024  Prepared by: Nano Barnes     Exercises  - Gastroc Stretch on Wall  - 1 x daily - 7 x weekly - 3 sets - 30 hold  - Long Sitting Ankle Dorsiflexion with Anchored Resistance  - 1 x daily - 7 x weekly - 2 sets - 10 reps  - Seated Arch Lifts  - 1 x daily - 7 x weekly - 2 sets - 10 reps  - Toe Yoga - Alternating Great Toe and Lesser Toe Extension  - 1 x daily - 7 x weekly - 2 sets - 10 reps  Added in eccentric heel raises  11/13/24: add PPU and reinforced calf stretches  Charges: manual:1, therex: 2       Total Timed Treatment: 38 min  Total Treatment Time: 38 min

## 2024-11-18 RX ORDER — LEVOCETIRIZINE DIHYDROCHLORIDE 5 MG/1
5 TABLET, FILM COATED ORAL EVERY EVENING
Qty: 90 TABLET | Refills: 3 | Status: SHIPPED | OUTPATIENT
Start: 2024-11-18

## 2024-11-19 NOTE — TELEPHONE ENCOUNTER
Refill Passed Per Protocol    Requested Prescriptions   Pending Prescriptions Disp Refills    LEVOCETIRIZINE 5 MG Oral Tab [Pharmacy Med Name: LEVOCETIRIZINE 5MG TABLETS] 90 tablet 3     Sig: TAKE 1 TABLET(5 MG) BY MOUTH EVERY EVENING       Allergy Medication Protocol Passed - 11/18/2024 10:30 PM        Passed - In person appointment or virtual visit in the past 12 mos or appointment in next 3 mos     Recent Outpatient Visits              5 days ago     [facility] Brunner, Heather, PT    Office Visit    1 week ago     [facility]    Office Visit    2 weeks ago [facility]ar fasciitis    [facility]    Office Visit    3 months ago Plantar fasciitis    [facility], Shae Hayden DPM    Office Visit    3 months ago Vertigo    [facility], Kev Dewitt MD    Office Visit          Future Appointments         Provider Department Appt Notes    [facility] no auth, no limit  [employer] [place]    [facility]'t [facility]), no auth, no limit  [employer] [place]                         Future Appointments         Provider Department Appt Notes    [facility](don't collect), no auth, [place] limit  [employer] [place]    In 1 week [facility]p $40(don't collect), no auth, no limit  [employer] [place]          Recent Outpatient Visits              5 days ago     [facility] Brunner, Heather, PT    Office Visit    1 week ago     [facility]    Office Visit    2 weeks ago [facility]ar fasciitis    [facility] [place]  NARESH [facility]gill    Office Visit    3 months ago Plantar fasciitis    [facility], Shae Hayden DPM    Office Visit    3 months ago Vertigo    [facility], Kev Dewitt MD    Office Visit

## 2024-11-22 ENCOUNTER — OFFICE VISIT (OUTPATIENT)
Dept: PHYSICAL THERAPY | Facility: HOSPITAL | Age: 54
End: 2024-11-22
Attending: STUDENT IN AN ORGANIZED HEALTH CARE EDUCATION/TRAINING PROGRAM
Payer: MEDICARE

## 2024-11-22 PROCEDURE — 97110 THERAPEUTIC EXERCISES: CPT

## 2024-11-22 PROCEDURE — 97140 MANUAL THERAPY 1/> REGIONS: CPT

## 2024-11-22 NOTE — PROGRESS NOTES
Diagnosis:       R  Plantar fasciitis (M72.2)    Referring Provider: Shae Cobb  Date of Evaluation:    11/01/2024    Precautions:  None Next MD visit:   none scheduled  Date of Surgery: n/a   Insurance Primary/Secondary: AETNA MCR / MEDICAID     # Auth Visits: 8 per POC aetna            Subjective: Pt reports that she is a little better today/in general with ambulation. She states the pain is now a 6/10, down from 7.     Pain:6/10      Objective:   TTP: peroneal tendon, mid calf, soleus  -------------------  AROM: (* denotes performed with pain)  Foot/Ankle   DF: R 3 deg (prone knee bent); L WFL  PF: R WFL*; L WFL  INV: R 15deg* (inside); L WDL  EV: R 8 deg; L WFL      Accessory motion: limited all calcaneal mob of R foot, painful to palpate navicular and cuboid; hypomobile post glide talus     Flexibility:     Gastroc-soleus: R mod limited; L min limited     Strength/MMT: (* denotes performed with pain)  Hip Foot/Ankle   NT today DF: R 4+/5; L 5/5  PF: R 5/5; L 5/5  INV: R 5/5; L 5/5  EV: R 5/5; L 5/5  Great toe ext: R NT/5; L  NT/5  Great toe flex:5/5 B       Assessment: Pt continues to respond well to stabilization exercises and demonstrates improved control. She did feel a bit more pain at the end of the session following manual mobilizations.     Goals:   (to be met in 8 visits)  Pt will demonstrate improved DF AROM to >= 8 degrees to promote proper foot clearance during gait and greater ease descending stairs without compensation  Pt will have increased ankle strength to 5/5 throughout for improved ankle control with ADLs such as prolonged gait and stair negotiation.  Pt will have improved R SLS to >18s for increased ankle stability with ambulation on uneven surfaces such as gravel and grass   Pt will report <3/10 pain with work and home activities such as walking, stairs.   Pt will be independent and compliant with comprehensive HEP to maintain progress achieved in PT     Plan: Progress tolerance  to loaded positions.   Date: 11/8/2024  TX#: 2/8 Date:  11/13/24               TX#: 3/8 Date:11/22/2024                 TX#: 4/8 Date:                 TX#: 5/ Date:   Tx#: 6/   Therex:  3x20\" slantboard stretch  2x10 eccentric calf raises on the R from step with HHA  10x towel curls with toe flexion, R  2x10 ankle eversion, blue t band  15 toe yoga  10 arch lifts, 5\" Hold  2x10  big toe/toe flexion followed by pF into red t band in long sitting. TE: 23 mins  - PPU - no change  - calf stretch R  - peroneal nn   - s/l SLR abd, 2x10 R - triggers pain in lateral leg and foot  - PPU - relief  - wedge calf stretch 2x1'  - lateral stepping, x2 laps  - review HEP Therex:  2x30\" slant stretch gatroc  2x30\" slant stretch soleus  2x10 heel raises with ball between heels for peroneals   5x10\" standing plantar fascia stretch/toe flexors stretch   Seated plantar fascial self roll out on green roller x2min     Manual (20 min):  Pt in supine for GR III post talar mob followed by DF (improvements noted at end)  Prone for knee bent medial calcaneal glide GR II-III and soleus stretch  IASTM to plantar fascia and achilles MT: 15 mins  - STM /trigger pt release peroneal, gastroc, soleus  - CFM peroneal tendon  - TC jt mobs and distraction NR-ed:  Standing on airex, with posterior lunge for ankle stability x15 B  Standing peroneal n [id number]  Seated baps board, 20x A/P, 20x Med/lat, R LE     -  MT: 15 mins  - STM and IASTM /trigger pt release peroneal, gastroc, soleus  -   - TC jt mobs and distraction     HEP: Access Code: [email or web address]  URL: [email or web address]  Date: 11/01/2024  Prepared by: Nano Barnes     Exercises  - Gastroc Stretch on Wall  - 1 x daily - 7 x weekly - 3 sets - 30 hold  - Long Sitting Ankle Dorsiflexion with Anchored Resistance  - 1 x daily - 7 x weekly - 2 sets - 10 reps  - Seated Arch Lifts  - 1 x daily - 7 x weekly - 2 sets - 10 reps  - Toe Yoga - Alternating Great Toe and Lesser Toe Extension   - 1 x daily - 7 x weekly - 2 sets - 10 reps  Added in eccentric heel raises  11/13/24: add PPU and reinforced calf stretches  Charges: manual:1, therex: 2       Total Timed Treatment: 45 min  Total Treatment Time: 45 min

## 2024-11-27 NOTE — PROGRESS NOTES
11/26/24 2244   Allen Anxiety Scale   Anxious Mood 4   Tension 3   Fears 3   Insomnia 4   Intellectual 3   Depressed Mood 1   Somatic Complaints: Muscular 0   Somatic Complaints: Sensory 0   Cardiovascular Symptoms 0   Respiratory Symptoms 0   Gastrointestinal Symptoms 0   Genitourinary Symptoms 0   Autonomic Symptoms 3   Behavior at Interview 4   Allen Anxiety Score 25     Prn atarax 100mg provided.   Diagnosis:       R  Plantar fasciitis (M72.2)    Referring Provider: Shae Cobb  Date of Evaluation:    11/01/2024    Precautions:  None Next MD visit:   none scheduled  Date of Surgery: n/a   Insurance Primary/Secondary: AETNA MCR / MEDICAID     # Auth Visits: 8 per POC aet[employer]            Subjective: Pt reports the home exercises are ok and she has about the same amount of pain.    Pain: 7/10      Objective:     AROM: (* denotes performed with pain)  Foot/Ankle   DF: R 3 deg (prone knee bent); L WFL  PF: R WFL*; L WFL  INV: R 15deg* (inside); L WDL  EV: R 8 deg; L WFL      Accessory motion: limited all calcaneal mob of R foot, painful to palpate navicular and cuboid; hypomobile post glide talus     Flexibility:     Gastroc-soleus: R mod limited; L min limited     Strength/MMT: (* denotes performed with pain)  Hip Foot/Ankle   NT today DF: R 4+/5; L 5/5  PF: R 5/5; L 5/5  INV: R 5/5; L 5/5  EV: R 5/5; L 5/5  Great toe ext: R NT/5; L  NT/5  Great toe flex:5/5 B       Assessment: Lauren was able to tolerate all new exercises without increased complaints of pain, she was instructed on some new ones for home. Focused a bit on manual as her post talar glide is limited as is her calcaneal mobility. Pt did reports a reduction in pain at the end of the session following manual. Advised her to attempt frozen water bottle roll for Plantar fascia at home and to add in eccentric calf raises on the R.    Goals:   (to be met in 8 visits)  Pt will demonstrate improved DF AROM to >= 8 degrees to promote proper foot clearance during gait and greater ease descending stairs without compensation  Pt will have increased ankle strength to 5/5 throughout for improved ankle control with ADLs such as prolonged gait and stair negotiation.  Pt will have improved R SLS to >18s for increased ankle stability with ambulation on uneven surfaces such as gravel and grass   Pt will report <3/10 pain with work and home activities such as  walking, stairs.   Pt will be independent and compliant with comprehensive HEP to maintain progress achieved in PT     Plan: Progress tolerance to loaded positions.   Date: 11/8/2024  TX#: 2/8 Date:                 TX#: 3/ Date:                 TX#: 4/ Date:                 TX#: 5/ Date:   Tx#: 6/   Therex:  3x20\" slantboard stretch  2x10 eccentric calf raises on the R from step with HHA       10x towel curls with toe flexion, R  2x10 ankle eversion, blue t band  15 toe yoga  10 arch lifts, 5\" Hold  2x10  big toe/toe flexion followed by pF into red t band in long sitting.       Manual (20 min):  Pt in supine for GR III post talar mob followed by DF (improvements noted at end)  Prone for knee bent medial calcaneal glide GR II-III and soleus stretch  IASTM to plantar fascia and achilles       -       HEP: Access Code: [email or web address]  URL: [email or web address]  Date: 11/01/2024  Prepared by: Nano Barnes     Exercises  - Gastroc Stretch on Wall  - 1 x daily - 7 x weekly - 3 sets - 30 hold  - Long Sitting Ankle Dorsiflexion with Anchored Resistance  - 1 x daily - 7 x weekly - 2 sets - 10 reps  - Seated Arch Lifts  - 1 x daily - 7 x weekly - 2 sets - 10 reps  - Toe Yoga - Alternating Great Toe and Lesser Toe Extension  - 1 x daily - 7 x weekly - 2 sets - 10 reps  Added in eccentric heel raises    Charges: manual:1, therex: 2       Total Timed Treatment: 45 min  Total Treatment Time: 45 min

## 2024-11-29 ENCOUNTER — OFFICE VISIT (OUTPATIENT)
Dept: PHYSICAL THERAPY | Facility: HOSPITAL | Age: 54
End: 2024-11-29
Attending: STUDENT IN AN ORGANIZED HEALTH CARE EDUCATION/TRAINING PROGRAM
Payer: MEDICARE

## 2024-11-29 PROCEDURE — 97110 THERAPEUTIC EXERCISES: CPT

## 2024-11-29 PROCEDURE — 97140 MANUAL THERAPY 1/> REGIONS: CPT

## 2024-11-29 NOTE — PROGRESS NOTES
Diagnosis:       R  Plantar fasciitis (M72.2)    Referring Provider: Shae Cobb  Date of Evaluation:    11/01/2024    Precautions:  None Next MD visit:   none scheduled  Date of Surgery: n/a   Insurance Primary/Secondary: AETNA MCR / MEDICAID     # Auth Visits: 8 per POC a[employer]            Subjective: Pt reports same pain levels as last week.  Pain:7/10      Objective:   TTP: peroneal tendon, mid calf, soleus  -------------------  AROM: (* denotes performed with pain)  Foot/Ankle   DF: R 3 deg (prone knee bent); L WFL  PF: R WFL*; L WFL  INV: R 15deg* (inside); L WDL  EV: R 8 deg; L WFL      Accessory motion: limited all calcaneal mob of R foot, painful to palpate navicular and cuboid; hypomobile post glide talus     Flexibility:     Gastroc-soleus: R mod limited; L min limited     Strength/MMT: (* denotes performed with pain)  Hip Foot/Ankle   NT today DF: R 4+/5; L 5/5  PF: R 5/5; L 5/5  INV: R 5/5; L 5/5  EV: R 5/5; L 5/5  Great toe ext: R NT/5; L  NT/5  Great toe flex:5/5 B       Assessment:  Pt able to tolerate all exercise progressions well. Added inver/kennedy to HEP, which she tolerated well. She demonstrates improved improved stability and control with SLS and overhead ball lift. Added KT to peroneal tendons, will assess how it feels.    Goals:   (to be met in 8 visits)  Pt will demonstrate improved DF AROM to >= 8 degrees to promote proper foot clearance during gait and greater ease descending stairs without compensation. progressing  Pt will have increased ankle strength to 5/5 throughout for improved ankle control with ADLs such as prolonged gait and stair negotiation. Progressing  Pt will have improved R SLS to >18s for increased ankle stability with ambulation on uneven surfaces such as gravel and grass   Pt will report <3/10 pain with work and home activities such as walking, stairs.   Pt will be independent and compliant with comprehensive HEP to maintain progress achieved in PT.  Progressing.    Plan: Progress tolerance to loaded positions.   Date: 11/8/2024  TX#: 2/8 Date:  11/13/24               TX#: 3/8 Date:11/22/2024                 TX#: 4/8 Date: 11/29/2024                TX#: 5/8 Date:   Tx#: 6/   Therex:  3x20\" slantboard stretch  2x10 eccentric calf raises on the R from step with HHA  10x towel curls with toe flexion, R  2x10 ankle eversion, blue t band  15 toe yoga  10 arch lifts, 5\" Hold  2x10  big toe/toe flexion followed by pF into red t band in long sitting. TE: 23 mins  - PPU - no change  - calf stretch R  - peroneal nn   - s/l SLR abd, 2x10 R - triggers pain in lateral leg and foot  - PPU - relief  - wedge calf stretch 2x1'  - lateral stepping, x2 laps  - review HEP Therex:  2x30\" slant stretch gatroc  2x30\" slant stretch soleus  2x10 heel raises with ball between heels for peroneals   5x10\" standing plantar fascia stretch/toe flexors stretch   Seated plantar fascial self roll out on green roller x2min Therex:  2x30\" slant stretch gastroc  2x30\" slant soleus  3x lateral walk c t band at met heads, red  10x standing hip ext  2x10 seated ankle inversion and eversion strengthening blue t band  KT taping peroneal tendons.    Manual (20 min):  Pt in supine for GR III post talar mob followed by DF (improvements noted at end)  Prone for knee bent medial calcaneal glide GR II-III and soleus stretch  IASTM to plantar fascia and achilles MT: 15 mins  - STM /trigger pt release peroneal, gastroc, soleus  - CFM peroneal tendon  - TC jt mobs and distraction NR-ed:  Standing on airex, with posterior lunge for ankle stability x15 B  Standing peroneal n [id number]  Seated baps board, 20x A/P, 20x Med/lat, R LE NR-ed:  Standing baps board R foot 20x A/P, 20x Med lat  10x SLS on airex with overhead ball lift, R LE (increased pain)    -  MT: 15 mins  - STM and IASTM /trigger pt release peroneal, gastroc, soleus  -   - TC jt mobs and distraction MT: 15 mins  - STM and IASTM /trigger pt release  peroneal, gastroc, soleus  -   - TC jt mobs and distraction, medial calcaneal mob    HEP: Access Code: [email or web address]  URL: [email or web address]  Date: 11/01/2024  Prepared by: Nano Barnes     Exercises  - Gastroc Stretch on Wall  - 1 x daily - 7 x weekly - 3 sets - 30 hold  - Long Sitting Ankle Dorsiflexion with Anchored Resistance  - 1 x daily - 7 x weekly - 2 sets - 10 reps  - Seated Arch Lifts  - 1 x daily - 7 x weekly - 2 sets - 10 reps  - Toe Yoga - Alternating Great Toe and Lesser Toe Extension  - 1 x daily - 7 x weekly - 2 sets - 10 reps  Added in eccentric heel raises  11/13/24: add PPU and reinforced calf stretches  Access Code: [email or web address]  URL: [email or web address]  Date: 11/29/2024  Prepared by: Nano Barnes    Exercises  - Ankle Eversion with Resistance  - 1 x daily - 7 x weekly - 3 sets - 10 reps  - Ankle Inversion with Resistance  - 1 x daily - 7 x weekly - 3 sets - 10 reps  - Single Leg Stance  - 1 x daily - 7 x weekly - 3 sets - 15 sec hold  - Mini Squat with Counter Support  - 1 x daily - 7 x weekly - 2 sets - 10 reps  Charges: manual:1, therex: 2       Total Timed Treatment: 45 min  Total Treatment Time: 45 min

## 2024-12-06 ENCOUNTER — OFFICE VISIT (OUTPATIENT)
Dept: PHYSICAL THERAPY | Facility: HOSPITAL | Age: 54
End: 2024-12-06
Attending: STUDENT IN AN ORGANIZED HEALTH CARE EDUCATION/TRAINING PROGRAM
Payer: MEDICARE

## 2024-12-06 PROCEDURE — 97140 MANUAL THERAPY 1/> REGIONS: CPT

## 2024-12-06 PROCEDURE — 97110 THERAPEUTIC EXERCISES: CPT

## 2024-12-11 ENCOUNTER — HOSPITAL ENCOUNTER (OUTPATIENT)
Dept: GENERAL RADIOLOGY | Age: 54
Discharge: HOME | End: 2024-12-11
Attending: FAMILY MEDICINE
Payer: MEDICARE

## 2024-12-11 ENCOUNTER — OFFICE VISIT (OUTPATIENT)
Dept: FAMILY MEDICINE CLINIC | Facility: CLINIC | Age: 54
End: 2024-12-11

## 2024-12-11 VITALS
SYSTOLIC BLOOD PRESSURE: 133 MMHG | WEIGHT: 195.81 LBS | DIASTOLIC BLOOD PRESSURE: 74 MMHG | HEIGHT: 67 IN | BODY MASS INDEX: 30.73 KG/M2 | TEMPERATURE: 98 F | HEART RATE: 61 BPM

## 2024-12-11 DIAGNOSIS — J45.20 MILD INTERMITTENT ASTHMA WITHOUT COMPLICATION (HCC): Primary | ICD-10-CM

## 2024-12-11 DIAGNOSIS — M79.671 PAIN OF RIGHT HEEL: ICD-10-CM

## 2024-12-11 DIAGNOSIS — J34.89 SINUS PRESSURE: ICD-10-CM

## 2024-12-11 PROBLEM — J44.89 ASTHMA WITH COPD (CHRONIC OBSTRUCTIVE PULMONARY DISEASE) (HCC): Chronic | Status: RESOLVED | Noted: 2024-05-21 | Resolved: 2024-12-11

## 2024-12-11 PROBLEM — J44.89 ASTHMA WITH COPD (CHRONIC OBSTRUCTIVE PULMONARY DISEASE) (HCC): Chronic | Status: ACTIVE | Noted: 2024-12-11

## 2024-12-11 PROCEDURE — G0008 ADMIN INFLUENZA VIRUS VAC: HCPCS | Performed by: FAMILY MEDICINE

## 2024-12-11 PROCEDURE — 90656 IIV3 VACC NO PRSV 0.5 ML IM: CPT | Performed by: FAMILY MEDICINE

## 2024-12-11 PROCEDURE — 73650 X-RAY EXAM OF HEEL: CPT | Performed by: FAMILY MEDICINE

## 2024-12-11 PROCEDURE — 99214 OFFICE O/P EST MOD 30 MIN: CPT | Performed by: FAMILY MEDICINE

## 2024-12-11 RX ORDER — PREDNISONE 20 MG/1
40 TABLET ORAL DAILY
Qty: 10 TABLET | Refills: 0 | Status: SHIPPED | OUTPATIENT
Start: 2024-12-11 | End: 2024-12-16

## 2024-12-13 ENCOUNTER — APPOINTMENT (OUTPATIENT)
Dept: PHYSICAL THERAPY | Facility: HOSPITAL | Age: 54
End: 2024-12-13
Attending: STUDENT IN AN ORGANIZED HEALTH CARE EDUCATION/TRAINING PROGRAM
Payer: MEDICARE

## 2024-12-16 DIAGNOSIS — M77.30 HEEL SPUR, UNSPECIFIED LATERALITY: Primary | ICD-10-CM

## 2024-12-16 NOTE — PROGRESS NOTES
Patient does have small bone spur at area of pain. New referral placed for Dr. Stephens for another opinion for treatment. - Dr. Whitley

## 2024-12-27 ENCOUNTER — APPOINTMENT (OUTPATIENT)
Dept: PHYSICAL THERAPY | Facility: HOSPITAL | Age: 54
End: 2024-12-27
Attending: STUDENT IN AN ORGANIZED HEALTH CARE EDUCATION/TRAINING PROGRAM
Payer: MEDICARE

## 2025-01-03 ENCOUNTER — APPOINTMENT (OUTPATIENT)
Dept: PHYSICAL THERAPY | Facility: HOSPITAL | Age: 55
End: 2025-01-03
Attending: STUDENT IN AN ORGANIZED HEALTH CARE EDUCATION/TRAINING PROGRAM
Payer: MEDICARE

## 2025-01-08 ENCOUNTER — OFFICE VISIT (OUTPATIENT)
Dept: FAMILY MEDICINE CLINIC | Facility: CLINIC | Age: 55
End: 2025-01-08

## 2025-01-08 ENCOUNTER — LAB ENCOUNTER (OUTPATIENT)
Dept: LAB | Age: 55
End: 2025-01-08
Attending: FAMILY MEDICINE
Payer: MEDICARE

## 2025-01-08 VITALS
HEART RATE: 54 BPM | HEIGHT: 67 IN | WEIGHT: 195.38 LBS | BODY MASS INDEX: 30.66 KG/M2 | DIASTOLIC BLOOD PRESSURE: 80 MMHG | SYSTOLIC BLOOD PRESSURE: 132 MMHG

## 2025-01-08 DIAGNOSIS — Z00.00 ENCOUNTER FOR ANNUAL HEALTH EXAMINATION: ICD-10-CM

## 2025-01-08 DIAGNOSIS — M54.16 LUMBAR RADICULOPATHY: ICD-10-CM

## 2025-01-08 DIAGNOSIS — E55.9 VITAMIN D DEFICIENCY: ICD-10-CM

## 2025-01-08 DIAGNOSIS — R92.8 ABNORMAL MAMMOGRAM: ICD-10-CM

## 2025-01-08 DIAGNOSIS — J45.20 MILD INTERMITTENT ASTHMA WITHOUT COMPLICATION (HCC): Primary | ICD-10-CM

## 2025-01-08 DIAGNOSIS — J30.9 ALLERGIC RHINITIS, UNSPECIFIED SEASONALITY, UNSPECIFIED TRIGGER: ICD-10-CM

## 2025-01-08 DIAGNOSIS — M79.671 PAIN OF RIGHT HEEL: ICD-10-CM

## 2025-01-08 PROBLEM — N63.42 SUBAREOLAR MASS OF LEFT BREAST: Status: RESOLVED | Noted: 2024-01-25 | Resolved: 2025-01-08

## 2025-01-08 PROBLEM — J44.89 ASTHMA WITH COPD (CHRONIC OBSTRUCTIVE PULMONARY DISEASE) (HCC): Chronic | Status: RESOLVED | Noted: 2024-12-11 | Resolved: 2025-01-08

## 2025-01-08 PROBLEM — N61.0 MASTITIS: Status: RESOLVED | Noted: 2024-01-25 | Resolved: 2025-01-08

## 2025-01-08 LAB
ALBUMIN SERPL-MCNC: 4.8 G/DL (ref 3.2–4.8)
ALBUMIN/GLOB SERPL: 1.7 {RATIO} (ref 1–2)
ALP LIVER SERPL-CCNC: 68 U/L
ALT SERPL-CCNC: 22 U/L
ANION GAP SERPL CALC-SCNC: 8 MMOL/L (ref 0–18)
AST SERPL-CCNC: 22 U/L (ref ?–34)
BASOPHILS # BLD AUTO: 0.07 X10(3) UL (ref 0–0.2)
BASOPHILS NFR BLD AUTO: 1 %
BILIRUB SERPL-MCNC: 0.6 MG/DL (ref 0.3–1.2)
BUN BLD-MCNC: 10 MG/DL (ref 9–23)
BUN/CREAT SERPL: 15.9 (ref 10–20)
CALCIUM BLD-MCNC: 10.1 MG/DL (ref 8.7–10.4)
CHLORIDE SERPL-SCNC: 106 MMOL/L (ref 98–112)
CHOLEST SERPL-MCNC: 196 MG/DL (ref ?–200)
CO2 SERPL-SCNC: 28 MMOL/L (ref 21–32)
CREAT BLD-MCNC: 0.63 MG/DL
DEPRECATED RDW RBC AUTO: 47.9 FL (ref 35.1–46.3)
EGFRCR SERPLBLD CKD-EPI 2021: 106 ML/MIN/1.73M2 (ref 60–?)
EOSINOPHIL # BLD AUTO: 0.67 X10(3) UL (ref 0–0.7)
EOSINOPHIL NFR BLD AUTO: 9.5 %
ERYTHROCYTE [DISTWIDTH] IN BLOOD BY AUTOMATED COUNT: 13.5 % (ref 11–15)
FASTING PATIENT LIPID ANSWER: YES
FASTING STATUS PATIENT QL REPORTED: YES
GLOBULIN PLAS-MCNC: 2.8 G/DL (ref 2–3.5)
GLUCOSE BLD-MCNC: 85 MG/DL (ref 70–99)
HCT VFR BLD AUTO: 42.4 %
HDLC SERPL-MCNC: 64 MG/DL (ref 40–59)
HGB BLD-MCNC: 14 G/DL
IMM GRANULOCYTES # BLD AUTO: 0.02 X10(3) UL (ref 0–1)
IMM GRANULOCYTES NFR BLD: 0.3 %
LDLC SERPL CALC-MCNC: 120 MG/DL (ref ?–100)
LYMPHOCYTES # BLD AUTO: 1.5 X10(3) UL (ref 1–4)
LYMPHOCYTES NFR BLD AUTO: 21.2 %
MCH RBC QN AUTO: 31.6 PG (ref 26–34)
MCHC RBC AUTO-ENTMCNC: 33 G/DL (ref 31–37)
MCV RBC AUTO: 95.7 FL
MONOCYTES # BLD AUTO: 0.35 X10(3) UL (ref 0.1–1)
MONOCYTES NFR BLD AUTO: 5 %
NEUTROPHILS # BLD AUTO: 4.45 X10 (3) UL (ref 1.5–7.7)
NEUTROPHILS # BLD AUTO: 4.45 X10(3) UL (ref 1.5–7.7)
NEUTROPHILS NFR BLD AUTO: 63 %
NONHDLC SERPL-MCNC: 132 MG/DL (ref ?–130)
OSMOLALITY SERPL CALC.SUM OF ELEC: 292 MOSM/KG (ref 275–295)
PLATELET # BLD AUTO: 297 10(3)UL (ref 150–450)
POTASSIUM SERPL-SCNC: 4.1 MMOL/L (ref 3.5–5.1)
PROT SERPL-MCNC: 7.6 G/DL (ref 5.7–8.2)
RBC # BLD AUTO: 4.43 X10(6)UL
SODIUM SERPL-SCNC: 142 MMOL/L (ref 136–145)
TRIGL SERPL-MCNC: 68 MG/DL (ref 30–149)
TSI SER-ACNC: 0.7 UIU/ML (ref 0.55–4.78)
VIT B12 SERPL-MCNC: 409 PG/ML (ref 211–911)
VIT D+METAB SERPL-MCNC: 40.2 NG/ML (ref 30–100)
VLDLC SERPL CALC-MCNC: 12 MG/DL (ref 0–30)
WBC # BLD AUTO: 7.1 X10(3) UL (ref 4–11)

## 2025-01-08 PROCEDURE — 82607 VITAMIN B-12: CPT

## 2025-01-08 PROCEDURE — 82306 VITAMIN D 25 HYDROXY: CPT

## 2025-01-08 PROCEDURE — 84443 ASSAY THYROID STIM HORMONE: CPT

## 2025-01-08 PROCEDURE — 36415 COLL VENOUS BLD VENIPUNCTURE: CPT

## 2025-01-08 PROCEDURE — 80061 LIPID PANEL: CPT

## 2025-01-08 PROCEDURE — 80053 COMPREHEN METABOLIC PANEL: CPT

## 2025-01-08 PROCEDURE — 85025 COMPLETE CBC W/AUTO DIFF WBC: CPT

## 2025-01-08 RX ORDER — LEVOCETIRIZINE DIHYDROCHLORIDE 5 MG/1
5 TABLET, FILM COATED ORAL EVERY EVENING
Qty: 90 TABLET | Refills: 3 | Status: SHIPPED | OUTPATIENT
Start: 2025-01-08

## 2025-01-08 RX ORDER — FLUTICASONE PROPIONATE AND SALMETEROL 50; 500 UG/1; UG/1
1 POWDER RESPIRATORY (INHALATION) 2 TIMES DAILY
Qty: 60 EACH | Refills: 5 | Status: SHIPPED | OUTPATIENT
Start: 2025-01-08

## 2025-01-08 RX ORDER — NAPROXEN 500 MG/1
500 TABLET ORAL 2 TIMES DAILY WITH MEALS
Qty: 60 TABLET | Refills: 0 | Status: SHIPPED | OUTPATIENT
Start: 2025-01-08

## 2025-01-08 RX ORDER — ALBUTEROL SULFATE 90 UG/1
2 INHALANT RESPIRATORY (INHALATION) EVERY 6 HOURS PRN
Qty: 25.5 G | Refills: 1 | Status: SHIPPED | OUTPATIENT
Start: 2025-01-08

## 2025-01-08 RX ORDER — MONTELUKAST SODIUM 10 MG/1
10 TABLET ORAL NIGHTLY
Qty: 90 TABLET | Refills: 3 | Status: SHIPPED | OUTPATIENT
Start: 2025-01-08

## 2025-01-08 NOTE — PROGRESS NOTES
Subjective:   Lauren Keating is a 54 year old female who presents for a MA AHA (Medicare Advantage Annual Health Assessment) and Subsequent Annual Wellness visit (Pt already had Initial Annual Wellness) and scheduled follow up of multiple significant but stable problems.   Trying to stay active but still pain in right heel. Has appt with podiatrist next week. Has been doing exercises for it. Taking ibuprofen occasionally.   X-ray showed  CONCLUSION:   1. No acute fracture or dislocation.   2. Small plantar calcaneal enthesophyte.   History/Other:   Fall Risk Assessment:   She has been screened for Falls and is low risk.      Cognitive Assessment:   She had a completely normal cognitive assessment - see flowsheet entries     Functional Ability/Status:   Lauren Keating has some abnormal functions as listed below:  She has Meal Preparation difficulties based on screening of functional status.She has difficulties Shopping for Groceries based on screening of functional status. She has Vision problems based on screening of functional status.       Depression Screening (PHQ):  PHQ-2 SCORE: 0  , done 1/8/2025            Advanced Directives:   She does NOT have a Living Will. [Do you have a living will?: No]  She does NOT have a Power of  for Health Care. [Do you have a healthcare power of ?: No]  Discussed Advance Care Planning with patient (and family/surrogate if present). Standard forms made available to patient in After Visit Summary.      Patient Active Problem List   Diagnosis    Vitamin D deficiency    Lumbar radiculopathy    Mild intermittent asthma without complication (HCC)    Allergic rhinitis     Allergies:  She has No Known Allergies.    Current Medications:  Outpatient Medications Marked as Taking for the 1/8/25 encounter (Office Visit) with Piedad Whitley MD   Medication Sig    levocetirizine 5 MG Oral Tab Take 1 tablet (5 mg total) by mouth every evening.    albuterol 108 (90 Base) MCG/ACT  Inhalation Aero Soln Inhale 2 puffs into the lungs every 6 (six) hours as needed for Wheezing.    montelukast 10 MG Oral Tab Take 1 tablet (10 mg total) by mouth nightly.    ADVAIR DISKUS 500-50 MCG/ACT Inhalation Aerosol Powder, Breath Activated Inhale 1 puff into the lungs 2 (two) times daily.    omeprazole 20 MG Oral Capsule Delayed Release Take 1 capsule (20 mg total) by mouth before breakfast.    azelastine 0.1 % Nasal Solution 2 sprays by Nasal route daily.    traMADol 50 MG Oral Tab Take 1 tablet (50 mg total) by mouth daily as needed.    Cholecalciferol (VITAMIN D) 2000 units Oral Cap Take 1,000 capsules (2,000,000 Units total) by mouth daily.       Medical History:  She  has a past medical history of Asthma (HCC), Back problem, Degenerative joint disease (DJD) of lumbar spine, Deviated septum (01/10/2022), History of blood transfusion, Hypertrophy of both inferior nasal turbinates (01/10/2022), Peptic ulcer disease, Uterine fibroid, and Vitamin D deficiency.  Surgical History:  She  has a past surgical history that includes Total abdominal hysterectomy (2004); laparoscopic cholecystectomy (2003); laminec/facetect/foramin,lumbar (08/21/2018); hc puncture aspiration breast cyst (Left, 10/12/2018); cholecystectomy; hysterectomy; exc skin benig 2.1-3cm face,facial (Right, 10/14/2019); repr cmpl wnd head,fac,hand 2.6-7.5 (Right, 10/14/2019); cyst aspiration left; spine surgery procedure unlisted; repair of nasal septum (01/10/2022); excision turbinate,submucous (01/10/2022); total abdom hysterectomy (2000); and needle biopsy left (02/15/2024).   Family History:  Her family history includes Diabetes in her maternal grandmother; Hypertension in her maternal grandfather and maternal grandmother; No Known Problems in her father and mother.  Social History:  She  reports that she has never smoked. She has never used smokeless tobacco. She reports that she does not drink alcohol and does not use drugs.    Tobacco:        CAGE Alcohol Screen:   Cage screening not needed because reported NO to Alcohol use on social history.      Patient Care Team:  Piedad Whitley MD as PCP - General (Family Practice)  Balaji Grove MD (Physical Medicine)  Nano Barnes PT as Physical Therapist  Brunner, Heather, PT as Physical Therapist (Physical Therapy)    Review of Systems     Negative except heel pain.     Objective:   Physical Exam  Constitutional:       Appearance: She is well-developed.   HENT:      Right Ear: Tympanic membrane normal.      Left Ear: Tympanic membrane normal.   Eyes:      Conjunctiva/sclera: Conjunctivae normal.   Cardiovascular:      Rate and Rhythm: Normal rate and regular rhythm.      Heart sounds: Normal heart sounds.   Pulmonary:      Effort: Pulmonary effort is normal.      Breath sounds: Normal breath sounds.   Abdominal:      General: Bowel sounds are normal.      Palpations: Abdomen is soft.   Musculoskeletal:      Comments: Tender right heel    Skin:     General: Skin is warm and dry.   Neurological:      Mental Status: She is alert and oriented to person, place, and time.      Deep Tendon Reflexes: Reflexes are normal and symmetric.   Psychiatric:         Behavior: Behavior normal.       {    /72   Pulse 54   Ht 5' 7\" (1.702 m)   Wt 195 lb 6.4 oz (88.6 kg)   BMI 30.60 kg/m²  Estimated body mass index is 30.6 kg/m² as calculated from the following:    Height as of this encounter: 5' 7\" (1.702 m).    Weight as of this encounter: 195 lb 6.4 oz (88.6 kg).  /80   Pulse 54   Ht 5' 7\" (1.702 m)   Wt 195 lb 6.4 oz (88.6 kg)   BMI 30.60 kg/m²     Medicare Hearing Assessment:   Hearing Screening    Time taken: 1/8/2025 11:42 AM  Screening Method: Questionnaire  I have a problem hearing over the telephone: No I have trouble following the conversations when two or more people are talking at the same time: No   I have trouble understanding things on the TV: No I have to strain to understand  conversations: No   I have to worry about missing the telephone ring or doorbell: No I have trouble hearing conversations in a noisy background such as a crowded room or restaurant: No   I get confused about where sounds come from: No I misunderstand some words in a sentence and need to ask people to repeat themselves: No   I especially have trouble understanding the speech of women and children: No I have trouble understanding the speaker in a large room such as at a meeting or place of [employer]: No   Many people I talk to seem to mumble (or don't speak clearly): No People get annoyed because I misunderstand what they say: No   I misunderstand what others are saying and make inappropriate responses: No I avoid social activities because I cannot hear well and fear I will reply improperly: No   Family members and friends have told me they think I may have hearing loss: No             Visual Acuity:   Right Eye Visual Acuity: Uncorrected Right Eye Chart Acuity: 20/40   Left Eye Visual Acuity: Uncorrected Left Eye Chart Acuity: 20/40   Both Eyes Visual Acuity: Uncorrected Both Eyes Chart Acuity: 20/40            Assessment & Plan:   Lauren Keating is a 54 year old female who presents for a Medicare Assessment.     1. Mild intermittent asthma without complication (HCC)  Stable on current medications.   - montelukast 10 MG Oral Tab; Take 1 tablet (10 mg total) by mouth nightly.  Dispense: 90 tablet; Refill: 3    2. Abnormal mammogram  Overdue for imaging. Order placed.   - [facility] ANJUM 2D+3D DIAGNOSTIC [facility]  BILAT (CPT=77066/52026); Future    3. Lumbar radiculopathy  Stable     4. Vitamin D deficiency  Due for labs.   - Vitamin D; Future    5. Allergic rhinitis, unspecified seasonality, unspecified trigger  Stable on meds   - montelukast 10 MG Oral Tab; Take 1 tablet (10 mg total) by mouth nightly.  Dispense: 90 tablet; Refill: 3    6. Encounter for annual health examination    - CBC With Differential With Platelet; Future  - Comp  Metabolic Panel (14); Future  - Lipid Panel; Future  - TSH W Reflex To Free T4; Future  - Vitamin B12; Future  - Vitamin D; Future    7. Pain of right heel  Naproxen BID with food. Has upcoming appt with podiatrist.     The patient indicates understanding of these issues and agrees to the plan.  Reinforced healthy diet, lifestyle, and exercise.      No follow-ups on file.     Piedad Whitley MD, 1/8/2025     Supplementary Documentation:   General Health:  In the past six months, have you lost more than 10 pounds without trying?: 2 - No  Has your appetite been poor?: No  Type of Diet: Balanced  How does the patient maintain a good energy level?: Daily Walks  How would you describe your daily physical activity?: Light  How would you describe your current health state?: Poor  How do you maintain positive mental well-being?: Visiting Family  Have you had any immunizations at another office such as Influenza, Hepatitis B, Tetanus, or Pneumococcal?: No    Health Maintenance   Topic Date Due    DTaP,Tdap,and Td Vaccines (1 - Tdap) Never done    Zoster Vaccines (1 of 2) Never done    COVID-19 Vaccine (4 - 2024-25 season) 09/01/2024    Annual Well Visit  01/01/2025    Annual Depression Screening  01/01/2025    Mammogram  02/06/2025    Colorectal Cancer Screening  04/27/2033    Influenza Vaccine  Completed    Pneumococcal Vaccine: 50+ Years  Completed    Meningococcal B Vaccine  Aged Out

## 2025-01-13 NOTE — PROGRESS NOTES
Cholesterol was a bit higher this time. Continue following healthy diet and regular exercise. Rest of the labs were normal. - Dr. Whitley

## 2025-01-17 ENCOUNTER — OFFICE VISIT (OUTPATIENT)
Dept: PODIATRY CLINIC | Facility: CLINIC | Age: 55
End: 2025-01-17

## 2025-01-17 VITALS — DIASTOLIC BLOOD PRESSURE: 81 MMHG | SYSTOLIC BLOOD PRESSURE: 129 MMHG | HEART RATE: 64 BPM

## 2025-01-17 DIAGNOSIS — M72.2 PLANTAR FASCIITIS: Primary | ICD-10-CM

## 2025-01-17 PROCEDURE — 99214 OFFICE O/P EST MOD 30 MIN: CPT | Performed by: STUDENT IN AN ORGANIZED HEALTH CARE EDUCATION/TRAINING PROGRAM

## 2025-01-17 RX ORDER — DEXAMETHASONE SODIUM PHOSPHATE 4 MG/ML
4 VIAL (ML) INJECTION ONCE
Status: COMPLETED | OUTPATIENT
Start: 2025-01-17 | End: 2025-01-17

## 2025-01-17 RX ORDER — TRIAMCINOLONE ACETONIDE 40 MG/ML
40 INJECTION, SUSPENSION INTRA-ARTICULAR; INTRAMUSCULAR ONCE
Status: COMPLETED | OUTPATIENT
Start: 2025-01-17 | End: 2025-01-17

## 2025-01-17 RX ORDER — METHYLPREDNISOLONE 4 MG/1
TABLET ORAL
Qty: 21 TABLET | Refills: 0 | Status: SHIPPED | OUTPATIENT
Start: 2025-01-17

## 2025-01-17 RX ADMIN — TRIAMCINOLONE ACETONIDE 40 MG: 40 INJECTION, SUSPENSION INTRA-ARTICULAR; INTRAMUSCULAR at 10:43:00

## 2025-01-17 RX ADMIN — DEXAMETHASONE SODIUM PHOSPHATE 4 MG: 4 MG/ML VIAL (ML) INJECTION at 10:43:00

## 2025-01-17 NOTE — PROGRESS NOTES
[facility]  Progress Note      Lauren Keating is a 54 year old female.   Chief Complaint   Patient presents with    Heel Pain     Right  f/u - had a cortisone inj on 8/19/24 - states did not helped at all - has pain rated as 7-9/10 on and off - mostly with walking          HPI:     Patient is a pleasant 54-year-old female presents to clinic for follow-up for right plantar fasciitis.  Patient had a cortisone injection in August 2020 for which she states it did not help much.  She admits to completing physical therapy with no improvement.  She relates that she currently stretches once a day.  Admits walking to walking with supportive shoes.      Allergies: Patient has no known allergies.    Current Outpatient Medications   Medication Sig Dispense Refill    ADVAIR DISKUS 500-50 MCG/ACT Inhalation Aerosol Powder, Breath Activated Inhale 1 puff into the lungs 2 (two) times daily. 60 each 5    albuterol 108 (90 Base) MCG/ACT Inhalation Aero Soln Inhale 2 puffs into the lungs every 6 (six) hours as needed for Wheezing. 25.5 g 1    omeprazole 20 MG Oral Capsule Delayed Release Take 1 capsule (20 mg total) by mouth before breakfast. 90 capsule 3    montelukast 10 MG Oral Tab Take 1 tablet (10 mg total) by mouth nightly. 90 tablet 3    levocetirizine 5 MG Oral Tab Take 1 tablet (5 mg total) by mouth every evening. 90 tablet 3    naproxen 500 MG Oral Tab Take 1 tablet (500 mg total) by mouth 2 (two) times daily with meals. 60 tablet 0    meclizine 12.5 MG Oral Tab Take 1 tablet (12.5 mg total) by mouth 3 (three) times daily as needed. (Patient not taking: Reported on 1/8/2025) 40 tablet 0    diclofenac (VOLTAREN) 1 % External Gel Apply 2 g topically 4 (four) times daily. (Patient not taking: Reported on 1/8/2025) 100 g 1    azelastine 0.1 % Nasal Solution 2 sprays by Nasal route daily. 1 each 4    traMADol 50 MG Oral Tab Take 1 tablet (50 mg total) by mouth daily as needed.      Cholecalciferol (VITAMIN D) 2000 units  Oral Cap Take 1,000 capsules (2,000,000 Units total) by mouth daily.        Past Medical History:    Asthma (HCC)    Back problem    Degenerative joint disease (DJD) of lumbar spine    Deviated septum    History of blood transfusion    many years ago, no reactions    Hypertrophy of both inferior nasal turbinates    Peptic ulcer disease    Uterine fibroid    Vitamin D deficiency      Past Surgical History:   Procedure Laterality Date    Cholecystectomy      Cyst aspiration left      Exc skin benig 2.1-3cm face,facial Right 10/14/2019    Excision turbinate,submucous  01/10/2022    Hc puncture aspiration breast cyst Left 10/12/2018    Hysterectomy      Laminec/facetect/foramin,lumbar  08/21/2018    and discectomy    Laparoscopic cholecystectomy  2003    Needle biopsy left  02/15/2024    US guided    Repair of nasal septum  01/10/2022    Repr cmpl wnd head,fac,hand 2.6-7.5 Right 10/14/2019    Spine surgery procedure unlisted      Total abdom hysterectomy  2000    Total abdominal hysterectomy  2004    unilateral salpingoophorectomy, postop bleeding req surgical exploration      Family History   Problem Relation Age of Onset    No Known Problems Mother     No Known Problems Father     Diabetes Maternal Grandmother     Hypertension Maternal Grandmother     Hypertension Maternal Grandfather     Breast Cancer Neg     Ovarian Cancer Neg       Social History     Socioeconomic History    Marital status:     Number of children: 2   Occupational History    Occupation: shipping     Comment: disabled   Tobacco Use    Smoking status: Never    Smokeless tobacco: Never   Vaping Use    Vaping status: Never Used   Substance and Sexual Activity    Alcohol use: Never    Drug use: Never   Other Topics Concern    Caffeine Concern Yes     Comment: pop some time    Exercise No    Pt has a pacemaker No    Pt has a defibrillator No    Reaction to local anesthetic No           REVIEW OF SYSTEMS:     Denies nause, fever, chills  No calf  pain  Denies chest pain or SOB      EXAM:   There were no vitals taken for this visit.  GENERAL: well developed, well nourished, in no apparent distress  EXTREMITIES:   1. Integument: Normal skin temperature and turgor  2. Vascular: Dorsalis pedis two out of four bilateral and posterior tibial pulses two out of   four bilateral, capillary refill normal.   3. Musculoskeletal: All muscle groups are graded 5 out of 5 in the foot and ankle.  Pain with palpation to right medial calcaneal tubercle   4. Neurological: Normal sharp dull sensation; reflexes normal.             ASSESSMENT AND PLAN:   There are no diagnoses linked to this encounter.    Plan:     -Patient examined, chart history reviewed.  -Discussed etiology of patient's condition and various treatment options.  -Discussed importance of proper shoe gear and orthotics.  Patient to avoid walking barefoot at all times.  -Discussed importance of stretching exercises.  Patient to aim to stretch 3-5 times daily.  -Discussed steroid injection with patient including benefits and risks.  Patient agrees to injection and written consent was obtained.  -After prepping site with alcohol, administered steroid injection to right plantar heel consisting of 1 cc of 1% lidocaine plain, 1 cc of dexamethasone, and  1cc of Kenalog 40.  Patient tolerated the injection well and there were no complications.  Site was dressed with Band-Aid.  -Wear supportive shoe gear and inserts at all times with ambulation.  Avoid walking barefoot.  - Perform stretching exercises 3-5 times daily.  Instructions dispensed.  - Monitor response to steroid injection.  - Follow-up in 1 month1 for reevaluation.      The patient indicates understanding of these issues and agrees to the plan.        Shae Cobb DPM

## 2025-01-17 NOTE — PROGRESS NOTES
Per verbal order from Dr Cobb, draw up 1 ml Dexamethasone Sodium, 1 ml Lidocaine 1% and 1 ml Kenalog 40 mg for right heel. Danica Cotter

## 2025-01-23 ENCOUNTER — HOSPITAL ENCOUNTER (OUTPATIENT)
Dept: MAMMOGRAPHY | Facility: HOSPITAL | Age: 55
Discharge: HOME | End: 2025-01-23
Attending: FAMILY MEDICINE
Payer: MEDICARE

## 2025-01-23 ENCOUNTER — HOSPITAL ENCOUNTER (OUTPATIENT)
Dept: ULTRASOUND IMAGING | Facility: HOSPITAL | Age: 55
Discharge: HOME | End: 2025-01-23
Attending: FAMILY MEDICINE
Payer: MEDICARE

## 2025-01-23 DIAGNOSIS — R92.8 ABNORMAL MAMMOGRAM: ICD-10-CM

## 2025-01-23 PROCEDURE — 76642 ULTRASOUND BREAST LIMITED: CPT | Performed by: FAMILY MEDICINE

## 2025-01-23 PROCEDURE — 77062 BREAST TOMOSYNTHESIS BI: CPT | Performed by: FAMILY MEDICINE

## 2025-01-23 PROCEDURE — 77066 DX MAMMO INCL CAD BI: CPT | Performed by: FAMILY MEDICINE

## 2025-01-27 DIAGNOSIS — R92.8 ABNORMAL MAMMOGRAM: Primary | ICD-10-CM

## 2025-02-10 ENCOUNTER — TELEPHONE (OUTPATIENT)
Dept: FAMILY MEDICINE CLINIC | Facility: CLINIC | Age: 55
End: 2025-02-10

## 2025-02-10 NOTE — TELEPHONE ENCOUNTER
Drug change request    .  Current Outpatient Medications   Medication Sig Dispense Refill    ADVAIR DISKUS 500-50 MCG/ACT Inhalation Aerosol Powder, Breath Activated Inhale 1 puff into the lungs 2 (two) times daily. 60 each 5     Drug not covered by patient plan.  The preferred alternative is  WIXELA/NHUBAER, FLUTICSALMEAER, BREOELLIPTAINH

## 2025-02-12 RX ORDER — FLUTICASONE PROPIONATE AND SALMETEROL 500; 50 UG/1; UG/1
1 POWDER RESPIRATORY (INHALATION) 2 TIMES DAILY
Qty: 60 EACH | Refills: 11 | Status: SHIPPED | OUTPATIENT
Start: 2025-02-12 | End: 2025-03-14

## 2025-02-12 RX ORDER — NAPROXEN 500 MG/1
500 TABLET ORAL 2 TIMES DAILY WITH MEALS
Qty: 60 TABLET | Refills: 0 | Status: SHIPPED | OUTPATIENT
Start: 2025-02-12

## 2025-03-20 RX ORDER — NAPROXEN 500 MG/1
500 TABLET ORAL 2 TIMES DAILY WITH MEALS
Qty: 180 TABLET | Refills: 0 | Status: SHIPPED | OUTPATIENT
Start: 2025-03-20

## 2025-04-01 ENCOUNTER — APPOINTMENT (OUTPATIENT)
Dept: GENERAL RADIOLOGY | Age: 55
End: 2025-04-01
Attending: NURSE PRACTITIONER
Payer: MEDICARE

## 2025-04-01 ENCOUNTER — HOSPITAL ENCOUNTER (OUTPATIENT)
Age: 55
Discharge: HOME | End: 2025-04-01
Payer: MEDICARE

## 2025-04-01 VITALS
OXYGEN SATURATION: 98 % | RESPIRATION RATE: 20 BRPM | SYSTOLIC BLOOD PRESSURE: 160 MMHG | TEMPERATURE: 97 F | HEART RATE: 71 BPM | DIASTOLIC BLOOD PRESSURE: 90 MMHG

## 2025-04-01 DIAGNOSIS — J18.9 ATYPICAL PNEUMONIA: ICD-10-CM

## 2025-04-01 DIAGNOSIS — R05.9 COUGH: Primary | ICD-10-CM

## 2025-04-01 PROCEDURE — U0002 COVID-19 LAB TEST NON-CDC: HCPCS | Performed by: NURSE PRACTITIONER

## 2025-04-01 PROCEDURE — 99214 OFFICE O/P EST MOD 30 MIN: CPT | Performed by: NURSE PRACTITIONER

## 2025-04-01 PROCEDURE — 71046 X-RAY EXAM CHEST 2 VIEWS: CPT | Performed by: NURSE PRACTITIONER

## 2025-04-01 PROCEDURE — 87502 INFLUENZA DNA AMP PROBE: CPT | Performed by: NURSE PRACTITIONER

## 2025-04-01 RX ORDER — AMOXICILLIN 500 MG/1
500 TABLET, FILM COATED ORAL 3 TIMES DAILY
Qty: 21 TABLET | Refills: 0 | Status: SHIPPED | OUTPATIENT
Start: 2025-04-01 | End: 2025-04-08

## 2025-04-01 RX ORDER — PREDNISONE 20 MG/1
40 TABLET ORAL DAILY
Qty: 10 TABLET | Refills: 0 | Status: SHIPPED | OUTPATIENT
Start: 2025-04-01 | End: 2025-04-06

## 2025-04-01 RX ORDER — AZITHROMYCIN 250 MG/1
TABLET, FILM COATED ORAL
Qty: 6 TABLET | Refills: 0 | Status: SHIPPED | OUTPATIENT
Start: 2025-04-01 | End: 2025-04-06

## 2025-04-01 NOTE — DISCHARGE INSTRUCTIONS
Tiene neumonía. [place] ambos antibióticos según lo recetado. [place] el esteroide a diario. Continúe usando leonard inhalador según sea necesario para la dificultad para respirar. Pat muchos líquidos. Llame al consultorio del Dr. Whitley para programar dino nueva revisión dentro de los próximos días. Vaya a la karen de emergencias si empeora los síntomas    You have pneumonia.  Take both antibiotics as prescribed.  Take the steroid daily.  Continue to use your inhaler as needed for shortness of breath.  Drink plenty of fluids.  Call Dr. Whitley's office to schedule a recheck within the next few days.  Go to the emergency room if worsening symptoms

## 2025-04-01 NOTE — ED PROVIDER NOTES
Patient Seen in: [facility]      History     Chief Complaint   Patient presents with    Cough/URI     Stated Complaint: Headache, Dizziness  Subjective:   54-year-old female with asthma presents from home.  She is here with her spouse.  Patient is here with complaints of cough, phlegm, headache, fatigue, legs feeling tired, shortness of breath.  Onset of symptoms about a week ago but states they increased in severity yesterday.  No fever.  She has been taking NyQuil at home and using her inhaler.  States she does have an appointment with her doctor today at 4 PM but did not think she could make it.  States she has had pneumonia in the past.  She did receive the pneumonia vaccine about 1 week ago.    The history is provided by the patient and the spouse. A  was used ([place] iPad  used [employer] #[id number]).     Objective:   No pertinent past medical history.        HISTORY:  Past Medical History:    Asthma (HCC)    Back problem    Degenerative joint disease (DJD) of lumbar spine    Deviated septum    History of blood transfusion    many years ago, no reactions    Hypertrophy of both inferior nasal turbinates    Peptic ulcer disease    Uterine fibroid    Vitamin D deficiency      Past Surgical History:   Procedure Laterality Date    Cholecystectomy      Cyst aspiration left      Exc skin benig 2.1-3cm face,facial Right 10/14/2019    Excision turbinate,submucous  01/10/2022    Hc puncture aspiration breast cyst Left 10/12/2018    Hysterectomy      Laminec/facetect/foramin,lumbar  08/21/2018    and discectomy    Laparoscopic cholecystectomy  2003    Needle biopsy left  02/15/2024    US guided    Repair of nasal septum  01/10/2022    Repr cmpl wnd head,fac,hand 2.6-7.5 Right 10/14/2019    Spine surgery procedure unlisted      Total abdom hysterectomy  2000    Total abdominal hysterectomy  2004    unilateral salpingoophorectomy, postop bleeding req surgical exploration      Family  History   Problem Relation Age of Onset    No Known Problems Mother     No Known Problems Father     Diabetes Maternal Grandmother     Hypertension Maternal Grandmother     Hypertension Maternal Grandfather     Breast Cancer Neg     Ovarian Cancer Neg       Social History     Socioeconomic History    Marital status:     Number of children: 2   Occupational History    Occupation: shipping     Comment: disabled   Tobacco Use    Smoking status: Never    Smokeless tobacco: Never   Vaping Use    Vaping status: Never Used   Substance and Sexual Activity    Alcohol use: Never    Drug use: Never   Other Topics Concern    Caffeine Concern Yes     Comment: pop some time    Exercise No    Pt has a pacemaker No    Pt has a defibrillator No    Reaction to local anesthetic No   Social History Narrative    The patient does not use an assistive device..      The patient does not live in a home with stairs.          No pertinent past surgical history.            No pertinent social history.          Review of Systems    Positive for stated complaint: Cough/URI    Other systems are as noted in HPI.  Constitutional and vital signs reviewed.      All other systems reviewed and negative except as noted above.    Physical Exam     ED Triage Vitals [04/01/25 1116]   /90   Pulse 72   Resp 18   Temp 97.4 °F (36.3 °C)   Temp src Oral   SpO2 97 %   O2 Device None (Room air)     Current:/90   Pulse 71   Temp 97.4 °F (36.3 °C) (Oral)   Resp 20   SpO2 98%     Physical Exam  Vitals and nursing note reviewed.   Constitutional:       General: She is not in acute distress.     Appearance: Normal appearance. She is ill-appearing (Appears uncomfortable but nontoxic). She is not toxic-appearing.   HENT:      Head: Normocephalic and atraumatic.      Right Ear: Tympanic membrane, ear canal and external ear normal.      Left Ear: Tympanic membrane, ear canal and external ear normal.      Nose: Nose normal.      Mouth/Throat:       Mouth: Mucous membranes are moist.      Pharynx: Oropharynx is clear.   Eyes:      Pupils: Pupils are equal, round, and reactive to light.   Cardiovascular:      Rate and Rhythm: Normal rate and regular rhythm.      Pulses: Normal pulses.   Pulmonary:      Effort: Pulmonary effort is normal. No respiratory distress.      Breath sounds: Normal breath sounds.      Comments: Dry cough noted. Lungs clear.  No adventitious lung sounds.  No distress.  No hypoxia.  Pulse ox 98% ra. Which is normal    Abdominal:      General: Abdomen is flat.      Palpations: Abdomen is soft.   Musculoskeletal:         General: No signs of injury. Normal range of motion.      Cervical back: Normal range of motion and neck supple.   Lymphadenopathy:      Cervical: No cervical adenopathy.   Skin:     General: Skin is warm and dry.      Capillary Refill: Capillary refill takes less than 2 seconds.   Neurological:      General: No focal deficit present.      Mental Status: She is alert and oriented to person, place, and time.      GCS: GCS eye subscore is 4. GCS verbal subscore is 5. GCS motor subscore is 6.      Comments: Moving all extremities.  Weightbearing and ambulatory.   Psychiatric:         Mood and Affect: Mood normal.         Behavior: Behavior normal.         Thought Content: Thought content normal.         Judgment: Judgment normal.         ED Course   Radiology:  XR CHEST PA + LAT CHEST (CPT=71046)    Result Date: 4/1/2025  CONCLUSION:   Mild streaky left mid lung and left basilar opacities could relate to atelectasis or atypical/viral infection.  No other focal airspace disease.    el-remote  Dictated by (CST): Lakhwinder Umana MD on 4/01/2025 at 11:40 AM     Finalized by (CST): Lakhwinder Umana MD on 4/01/2025 at 11:41 AM         Labs Reviewed   POCT FLU TEST - Normal    Narrative:     This assay is a rapid molecular in vitro test utilizing nucleic acid amplification of influenza A and B viral RNA.   RAPID SARS-COV-2 BY PCR -  Normal     Recent Results (from the past 24 hours)   POCT Flu Test    Collection Time: 04/01/25 11:13 AM    Specimen: Nares; Other   Result Value Ref Range    POCT INFLUENZA A Negative Negative    POCT INFLUENZA B Negative Negative   Rapid SARS-CoV-2 by PCR    Collection Time: 04/01/25 11:13 AM    Specimen: Nares; Other   Result Value Ref Range    Rapid SARS-CoV-2 by PCR Not Detected Not Detected       MDM     Medical Decision Making  Differential diagnoses reflecting the complexity of care include: COVID, flu, pneumonia, viral illness  Patient with cough, fatigue, headache for 1 week  She does appear uncomfortable but nontoxic.  No respiratory distress.  Lungs are clear.  No hypoxia.  Pulse ox 98% which is normal.  She is having persistent cough.  History of pneumonia.  Chest x-ray is showing a left-sided opacity.  Radiology describing as atypical or viral infection.  Considered single versus double coverage with antibiotics  COVID testing is negative  Flu testing is negative    Plan of Care: Rx amoxicillin and Zithromax, prednisone.  Recommend continuing to use the albuterol inhaler at home, declined to refill.  Push fluids.  Needs close follow-up with her primary doctor.  Discussed repeat chest x-ray in 1 month.  Patient was encouraged to go to the emergency room if worsening shortness of breath or other symptoms    Results and plan of care discussed with the patient/family. They are in agreement with discharge. They understand to follow up with their primary doctor or the referral physician for further evaluation, especially if no improvement.  Also discussed the limitations of immediate care, patient is aware that if symptoms are worse they should go to the emergency room. Verbal and written discharge instructions were given.     My independent interpretation of studies of: Left opacity noted on chest x-ray  Diagnostic tests and medications considered but not ordered were: Labs  Shared decision making was done  by: Patient agreeable to chest x-ray today.  Declined albuterol refill  Comorbidities that add complexity to management include: Asthma  External chart review was done and was noted: Reviewed, had pneumonia 7/2023.  Recently seen by primary physician for vertigo.  She was given Augmentin for sinusitis 12/11  History obtained by an independent source was from: Spouse  Discussions and management was done with: N/A  Social determinants of health that affect care: Language barrier              Problems Addressed:  Atypical pneumonia: acute illness or injury  Cough: acute illness or injury    Amount and/or Complexity of Data Reviewed  Independent Historian: spouse  External Data Reviewed: radiology and notes.  Labs: ordered. Decision-making details documented in ED Course.  Radiology: ordered and independent interpretation performed. Decision-making details documented in ED Course.    Risk  OTC drugs.  Prescription drug management.        Disposition and Plan     Clinical Impression:  1. Cough    2. Atypical pneumonia         Disposition:  Discharge  4/1/2025 11:52 am    Follow-up:  Piedad Whitley MD  [address] [place] [id number]  [phone]                Medications Prescribed:  Discharge Medication List as of 4/1/2025 11:53 AM        START taking these medications    Details   amoxicillin 500 MG Oral Tab Take 1 tablet (500 mg total) by mouth 3 (three) times daily for 7 days., Normal, Disp-21 tablet, R-0      azithromycin (ZITHROMAX Z-BESS) 250 MG Oral Tab 500 mg once followed by 250 mg daily x 4 days, Normal, Disp-6 tablet, R-0      predniSONE 20 MG Oral Tab Take 2 tablets (40 mg total) by mouth daily for 5 days., Normal, Disp-10 tablet, R-0

## 2025-04-01 NOTE — ED INITIAL ASSESSMENT (HPI)
Pt with hx of asthma complaining of cough, phlegm, fatigue and shortness of breath starting yesterday.  recently had cold symptoms. Pt taking nyquil and using her inhaler but doesn't feel like its helping.

## 2025-04-08 ENCOUNTER — OFFICE VISIT (OUTPATIENT)
Dept: FAMILY MEDICINE CLINIC | Facility: CLINIC | Age: 55
End: 2025-04-08
Payer: MEDICARE

## 2025-04-08 VITALS
HEART RATE: 60 BPM | WEIGHT: 195 LBS | DIASTOLIC BLOOD PRESSURE: 68 MMHG | SYSTOLIC BLOOD PRESSURE: 138 MMHG | RESPIRATION RATE: 16 BRPM | TEMPERATURE: 98 F | HEIGHT: 67 IN | BODY MASS INDEX: 30.61 KG/M2

## 2025-04-08 DIAGNOSIS — J18.9 PNEUMONIA OF LEFT LUNG DUE TO INFECTIOUS ORGANISM, UNSPECIFIED PART OF LUNG: Primary | ICD-10-CM

## 2025-04-08 PROCEDURE — 99213 OFFICE O/P EST LOW 20 MIN: CPT | Performed by: FAMILY MEDICINE

## 2025-04-08 RX ORDER — FLUTICASONE PROPIONATE AND SALMETEROL 500; 50 UG/1; UG/1
1 POWDER RESPIRATORY (INHALATION) 2 TIMES DAILY
COMMUNITY
Start: 2025-03-20

## 2025-06-04 RX ORDER — ALBUTEROL SULFATE 90 UG/1
2 INHALANT RESPIRATORY (INHALATION) EVERY 6 HOURS PRN
Qty: 25.5 G | Refills: 1 | Status: SHIPPED | OUTPATIENT
Start: 2025-06-04

## 2025-06-10 ENCOUNTER — OFFICE VISIT (OUTPATIENT)
Dept: PODIATRY CLINIC | Facility: CLINIC | Age: 55
End: 2025-06-10
Payer: MEDICARE

## 2025-06-10 VITALS — HEART RATE: 64 BPM | SYSTOLIC BLOOD PRESSURE: 115 MMHG | DIASTOLIC BLOOD PRESSURE: 64 MMHG

## 2025-06-10 DIAGNOSIS — M72.2 PLANTAR FASCIITIS: Primary | ICD-10-CM

## 2025-06-10 DIAGNOSIS — M79.671 RIGHT FOOT PAIN: ICD-10-CM

## 2025-06-10 DIAGNOSIS — M62.461 GASTROCNEMIUS EQUINUS OF RIGHT LOWER EXTREMITY: ICD-10-CM

## 2025-06-10 PROCEDURE — 99214 OFFICE O/P EST MOD 30 MIN: CPT | Performed by: STUDENT IN AN ORGANIZED HEALTH CARE EDUCATION/TRAINING PROGRAM

## 2025-06-10 RX ORDER — DEXAMETHASONE SODIUM PHOSPHATE 4 MG/ML
4 VIAL (ML) INJECTION ONCE
Status: COMPLETED | OUTPATIENT
Start: 2025-06-10 | End: 2025-06-10

## 2025-06-10 RX ORDER — TRIAMCINOLONE ACETONIDE 40 MG/ML
40 INJECTION, SUSPENSION INTRA-ARTICULAR; INTRAMUSCULAR ONCE
Status: COMPLETED | OUTPATIENT
Start: 2025-06-10 | End: 2025-06-10

## 2025-06-10 RX ADMIN — DEXAMETHASONE SODIUM PHOSPHATE 4 MG: 4 MG/ML VIAL (ML) INJECTION at 15:13:00

## 2025-06-10 RX ADMIN — TRIAMCINOLONE ACETONIDE 40 MG: 40 INJECTION, SUSPENSION INTRA-ARTICULAR; INTRAMUSCULAR at 15:14:00

## 2025-06-10 NOTE — PROGRESS NOTES
[facility]  Progress Note      Lauren Keating is a 54 year old female.   Chief Complaint   Patient presents with    Foot Pain     Pt in for f/u Right foot pain, states pain is 8/10 today, takes Naproxen when needed             HPI:     Patient is a pleasant 54-year-old female presents to clinic for follow-up of right foot pain.  Patient relates that the last cortisone injection did provide her with relief for few months.  She states that for the past month her pain has gotten worse rating it 8 out of 10.  She has been taking naproxen.  Patient denies any previous physical therapy.  She admits that when she is in a lot of pain she is unable to perform stretching exercises.  Denies any pedal injuries or trauma.      Allergies: Patient has no known allergies.    Current Medications[1]   Past Medical History[2]   Past Surgical History[3]   Family History[4]   Social Hx on file[5]        REVIEW OF SYSTEMS:     Denies nause, fever, chills  No calf pain  Denies chest pain or SOB      EXAM:   There were no vitals taken for this visit.  GENERAL: well developed, well nourished, in no apparent distress  EXTREMITIES:   1. Integument: Normal skin temperature and turgor  2. Vascular: Dorsalis pedis two out of four bilateral and posterior tibial pulses two out of   four bilateral, capillary refill normal.   3. Musculoskeletal: All muscle groups are graded 5 out of 5 in the foot and ankle.  Pain with palpation to right medial calcaneal tubercle   4. Neurological: Normal sharp dull sensation; reflexes normal.             ASSESSMENT AND PLAN:   Diagnoses and all orders for this visit:    Plantar fasciitis  -     Physical Therapy Referral - [facility]  -     dexamethasone (Decadron) 4 MG/ML injection 4 mg  -     triamcinolone acetonide (Kenalog-40) 40 MG/ML injection 40 mg    Right foot pain    Gastrocnemius equinus of right lower extremity        Plan:     -Patient examined, chart history reviewed.  -Discussed  etiology of patient's condition and various treatment options.  -Discussed importance of proper shoe gear and orthotics.  Patient to avoid walking barefoot at all times.  -Discussed importance of stretching exercises.  Patient to aim to stretch 3-5 times daily.  -Discussed steroid injection with patient including benefits and risks.  Patient agrees to injection and written consent was obtained.  -After prepping site with alcohol, administered steroid injection to right plantar heel consisting of 1 cc of 1% lidocaine plain, 1 cc of dexamethasone, and  1 cc of Kenalog 40.  Patient tolerated the injection well and there were no complications.  Site was dressed with Band-Aid.  -Wear supportive shoe gear and inserts at all times with ambulation.  Avoid walking barefoot.  - Perform stretching exercises 3-5 times daily.  Instructions dispensed.  - Monitor response to steroid injection.  - Follow-up in 2 months for reevaluation.      The patient indicates understanding of these issues and agrees to the plan.        Shae Cobb DPM        [1]   Current Outpatient Medications   Medication Sig Dispense Refill    albuterol 108 (90 Base) MCG/ACT Inhalation Aero Soln Inhale 2 puffs into the lungs every 6 (six) hours as needed for Wheezing. 25.5 g 1    fluticasone-salmeterol 500-50 MCG/ACT Inhalation Aerosol Powder, Breath Activated Inhale 1 puff into the lungs 2 (two) times daily.      naproxen 500 MG Oral Tab Take 1 tablet (500 mg total) by mouth 2 (two) times daily with meals. 180 tablet 0    omeprazole 20 MG Oral Capsule Delayed Release Take 1 capsule (20 mg total) by mouth before breakfast. 90 capsule 3    montelukast 10 MG Oral Tab Take 1 tablet (10 mg total) by mouth nightly. 90 tablet 3    diclofenac (VOLTAREN) 1 % External Gel Apply 2 g topically 4 (four) times daily. 100 g 1    azelastine 0.1 % Nasal Solution 2 sprays by Nasal route daily. 1 each 4    Cholecalciferol (VITAMIN D) 2000 units Oral Cap Take 1,000  capsules (2,000,000 Units total) by mouth daily.      levocetirizine 5 MG Oral Tab Take 1 tablet (5 mg total) by mouth every evening. 90 tablet 3    meclizine 12.5 MG Oral Tab Take 1 tablet (12.5 mg total) by mouth 3 (three) times daily as needed. (Patient not taking: Reported on 12/11/2024) 40 tablet 0   [2]   Past Medical History:   Asthma (HCC)    Back problem    Degenerative joint disease (DJD) of lumbar spine    Deviated septum    History of blood transfusion    many years ago, no reactions    Hypertrophy of both inferior nasal turbinates    Peptic ulcer disease    Uterine fibroid    Vitamin D deficiency   [3]   Past Surgical History:  Procedure Laterality Date    Cholecystectomy      Cyst aspiration left      Exc skin benig 2.1-3cm face,facial Right 10/14/2019    Excision turbinate,submucous  01/10/2022    Hc puncture aspiration breast cyst Left 10/12/2018    Hysterectomy      Laminec/facetect/foramin,lumbar  08/21/2018    and discectomy    Laparoscopic cholecystectomy  2003    Needle biopsy left  02/15/2024    US guided    Repair of nasal septum  01/10/2022    Repr cmpl wnd head,fac,hand 2.6-7.5 Right 10/14/2019    Spine surgery procedure unlisted      Total abdom hysterectomy  2000    Total abdominal hysterectomy  2004    unilateral salpingoophorectomy, postop bleeding req surgical exploration   [4]   Family History  Problem Relation Age of Onset    No Known Problems Mother     No Known Problems Father     Diabetes Maternal Grandmother     Hypertension Maternal Grandmother     Hypertension Maternal Grandfather     Breast Cancer Neg     Ovarian Cancer Neg    [5]   Social History  Socioeconomic History    Marital status:     Number of children: 2   Occupational History    Occupation: shipping     Comment: disabled   Tobacco Use    Smoking status: Never    Smokeless tobacco: Never   Vaping Use    Vaping status: Never Used   Substance and Sexual Activity    Alcohol use: Never    Drug use: Never   Other  Topics Concern    Caffeine Concern Yes     Comment: pop some time    Exercise No    Pt has a pacemaker No    Pt has a defibrillator No    Reaction to local anesthetic No

## 2025-06-17 ENCOUNTER — TELEPHONE (OUTPATIENT)
Facility: CLINIC | Age: 55
End: 2025-06-17

## 2025-06-17 DIAGNOSIS — M72.2 PLANTAR FASCIITIS: Primary | ICD-10-CM

## 2025-06-17 RX ORDER — MELOXICAM 15 MG/1
15 TABLET ORAL
Qty: 30 TABLET | Refills: 1 | Status: SHIPPED | OUTPATIENT
Start: 2025-06-17

## 2025-06-17 NOTE — TELEPHONE ENCOUNTER
Called patient with language line and she states right foot cortisone injection on 6/10 did not help with the pain. She denies that the pain is worse than prior to the injection. Pain 8/10. She denies any swelling.   She is taking OTC tylenol for pain with some relief, but pain returns after medication wears off. She denies any icing or stretching, but reports wearing supportive shoes.   I advised cortisone injection can take 1-2 weeks to see symptom improvement. Recommended doing daily stretches and wearing supportive shoes as instructed. Advised will discuss with provider if any further recommendations.

## 2025-06-17 NOTE — TELEPHONE ENCOUNTER
Called patient with language line and informed her per Dr Cobb' orders. Advised to not take with any other anti-inflammatories(NSAIDS) such as ibuprofen/advil, aleve/naproxen, diclofenac/Voltaren, etodolac. Stop medication if stomach upset occurs.   She verbalized understanding and will call back if symptoms do not improve or worsen. She had no further concerns.

## 2025-06-17 NOTE — TELEPHONE ENCOUNTER
Recommendations patient has to wait at least 2 months for another injection.  Recommend taking the oral meloxicam that I sent over to the pharmacy as well as continue icing and daily calf stretching exercises at least 3-5 times a day.

## 2025-06-17 NOTE — TELEPHONE ENCOUNTER
Patient received an injection on 6/10 for the right foot. Patient said it did not help and she is in a lot more pain. Patient would like to know if she can come in to be seen sooner.      Please advise.

## 2025-06-26 RX ORDER — NAPROXEN 500 MG/1
500 TABLET ORAL 2 TIMES DAILY WITH MEALS
Qty: 180 TABLET | Refills: 0 | Status: SHIPPED | OUTPATIENT
Start: 2025-06-26

## 2025-08-12 ENCOUNTER — OFFICE VISIT (OUTPATIENT)
Dept: PODIATRY CLINIC | Facility: CLINIC | Age: 55
End: 2025-08-12

## 2025-08-12 VITALS — DIASTOLIC BLOOD PRESSURE: 67 MMHG | SYSTOLIC BLOOD PRESSURE: 113 MMHG | HEART RATE: 63 BPM

## 2025-08-12 DIAGNOSIS — M79.671 RIGHT FOOT PAIN: ICD-10-CM

## 2025-08-12 DIAGNOSIS — M72.2 PLANTAR FASCIITIS: Primary | ICD-10-CM

## 2025-08-12 PROCEDURE — 99214 OFFICE O/P EST MOD 30 MIN: CPT | Performed by: STUDENT IN AN ORGANIZED HEALTH CARE EDUCATION/TRAINING PROGRAM

## 2025-08-12 RX ORDER — DEXAMETHASONE SODIUM PHOSPHATE 4 MG/ML
4 VIAL (ML) INJECTION ONCE
Status: COMPLETED | OUTPATIENT
Start: 2025-08-12 | End: 2025-08-12

## 2025-08-12 RX ORDER — TRIAMCINOLONE ACETONIDE 40 MG/ML
40 INJECTION, SUSPENSION INTRA-ARTICULAR; INTRAMUSCULAR ONCE
Status: COMPLETED | OUTPATIENT
Start: 2025-08-12 | End: 2025-08-12

## 2025-08-12 RX ADMIN — TRIAMCINOLONE ACETONIDE 40 MG: 40 INJECTION, SUSPENSION INTRA-ARTICULAR; INTRAMUSCULAR at 11:58:00

## (undated) DIAGNOSIS — J45.20 MILD INTERMITTENT ASTHMA WITHOUT COMPLICATION: ICD-10-CM

## (undated) DIAGNOSIS — J30.9 ALLERGIC RHINITIS, UNSPECIFIED SEASONALITY, UNSPECIFIED TRIGGER: ICD-10-CM

## (undated) DEVICE — LAMINECTOMY: Brand: MEDLINE INDUSTRIES, INC.

## (undated) DEVICE — NEEDLE HPO 18GA 1.5IN ECLPS

## (undated) DEVICE — SUTURE PLAIN GUT 4-0 SC-1

## (undated) DEVICE — SUTURE PLAIN GUT 5-0 PC-1

## (undated) DEVICE — SOL  .9 1000ML BTL

## (undated) DEVICE — ENCORE® LATEX ACCLAIM SIZE 8, STERILE LATEX POWDER-FREE SURGICAL GLOVE: Brand: ENCORE

## (undated) DEVICE — COVER PSTN BW FRM SKN CR KT

## (undated) DEVICE — STERILE LATEX POWDER-FREE SURGICAL GLOVESWITH NITRILE COATING: Brand: PROTEXIS

## (undated) DEVICE — DV DRAPE CAMERA ARM

## (undated) DEVICE — INSULATED BLADE ELECTRODE 6.5

## (undated) DEVICE — CAUTERY BLADE 2IN INS E1455

## (undated) DEVICE — SUCTION CANISTER, 3000CC,SAFELINER: Brand: DEROYAL

## (undated) DEVICE — TRAY SKIN PREP PVP-1

## (undated) DEVICE — APPLICATOR COTTON TIP 3 10/PK

## (undated) DEVICE — DRAPE C-ARM UNIVERSAL

## (undated) DEVICE — NASAL ACCESSORY: Brand: MEDLINE INDUSTRIES, INC.

## (undated) DEVICE — SUCTION TIP FRAZIER 10FR #3

## (undated) DEVICE — HEAD & NECK: Brand: MEDLINE INDUSTRIES, INC.

## (undated) DEVICE — REFLEX ULTRA 45 WITH INTEGRATED CABLE: Brand: COBLATION

## (undated) DEVICE — PACKING 8CM LNG SPT NSL STNG

## (undated) DEVICE — DRAPE SRG 150X54IN LEICA

## (undated) DEVICE — Device

## (undated) DEVICE — UNDYED BRAIDED (POLYGLACTIN 910), SYNTHETIC ABSORBABLE SUTURE: Brand: COATED VICRYL

## (undated) DEVICE — UNDYED MONOFILAMENT (POLYDIOXANONE), ABSORBABLE SYNTHETIC SURGICAL SUTURE: Brand: PDS

## (undated) DEVICE — SUTURE VICRYL 0 CT-1

## (undated) DEVICE — SUTURE VICRYL 2-0 CT-2

## (undated) DEVICE — FLOSEAL SEALENT STERILE 10ML

## (undated) DEVICE — DERMABOND LIQUID ADHESIVE

## (undated) NOTE — LETTER
March 25, 2019     Prudence Ace  [address]      Dear Yamil Puller:    Below are the results from your recent visit: Normal mammogram. Plan for repeat in 1 year.      Resulted Orders   [facility] ANJUM 2D+3D DIAGNOSTIC [facility]  CLAUDY (CPT=77066/51490) masses in both breasts. A square skin marker was placed over the patient's area of pain right upper outer. There is an underlying approximately 1.5 cm oval circumscribed mass. Targeted ultrasound was performed for further evaluation.      No new suspi Patient received a discharge summary from the technologist after completion of exam.     Breast marker legend used on images    Triangle = Palpable lump  Britton = Skin tag or mole  BB = Nipple  Linear lorna = Scar   Square = Pain            Dictated by (CST

## (undated) NOTE — LETTER
Date & Time: 4/1/2025, 11:53 AM  Patient: Lauren Keating  Encounter Provider(s):    Urvashi Vasquez APRN       To Whom It May Concern:    Lauren Keating was seen and treated in our department on 4/1/2025. She should not return to work until 4/5/25 .    If you have any questions or concerns, please do not hesitate to call.        _____________________________  Physician/APC Signature

## (undated) NOTE — LETTER
February 22, 2019     Tiana ROMO/ Jose Jimenez      Dear Edgardo Tafoya:    Below are the results from your recent visit: Normal prolactin levels.      Resulted Orders   PROLACTIN   Result Value Ref Range    Prolactin 18.3 ng/mL         If yo

## (undated) NOTE — ED AVS SNAPSHOT
[facility] Emergency Department  [address]ela [address]   [address] [place] [address]  Phone:  [phone]  Fax:  [phone]          Rufino Chauhan   MRN: [id number]    Department:  [facility] Emergency Department   Date of Visit:  6/24/2017 visiting [email or web address].    IF THERE IS ANY CHANGE OR WORSENING OF YOUR CONDITION, CALL YOUR PRIMARY CARE PHYSICIAN AT ONCE OR RETURN IMMEDIATELY TO THE EMERGENCY DEPARTMENT.     If you have been prescribed any medication(s), please fill your prescription

## (undated) NOTE — LETTER
AUTHORIZATION FOR SURGICAL OPERATION OR OTHER PROCEDURE    1. I hereby authorize Shae Bruner, and [facility] staff assigned to my case to perform the following operation and/or procedure at the [facility] site:    _______________________________________________________________________________________________    right heel cortisone injection  _______________________________________________________________________________________________    2.  My physician has explained the nature and purpose of the operation or other procedure, possible alternative methods of treatment, the risks involved, and the possibility of complication to me.  I acknowledge that no guarantee has been made as to the result that may be obtained.  3.  I recognize that, during the course of this operation, or other procedure, unforseen conditions may necessitate additional or different procedure than those listed above.  I, therefore, further authorize and request that the above named physician, his/her physician assistants or designees perform such procedures as are, in his/her professional opinion, necessary and desirable.  4.  Any tissue or organs removed in the operation or other procedure may be disposed of by and at the discretion of the [facility] and [facility].  5.  I understand that in the event of a medical emergency, I will be transported by local paramedics to [facility] or other hospital emergency department.  6.  I certify that I have read and fully understand the above consent to operation and/or other procedure.    7.  I acknowledge that my physician has explained sedation/analgesia administration to me including the risks and benefits.  I consent to the administration of sedation/analgesia as may be necessary or desirable in the judgement of my physician.    Witness signature: ___________________________________________________ Date:   ______/______/_____                    Time:  ________ A.M.  P.M.       Patient Name:  ______________________________________________________  (please print)      Patient signature:  ___________________________________________________             Relationship to Patient:           []  Parent    Responsible person                          []  Spouse  In case of minor or                    [] Other  _____________   Incompetent name:  __________________________________________________                               (please print)      _____________      Responsible person  In case of minor or  Incompetent signature:  _______________________________________________    Statement of Physician  My signature below affirms that prior to the time of the procedure, I have explained to the patient and/or his/her guardian, the risks and benefits involved in the proposed treatment and any reasonable alternative to the proposed treatment.  I have also explained the risks and benefits involved in the refusal of the proposed treatment and have answered the patient's questions.                        Date:  ______/______/_______  Provider                      Signature:  __________________________________________________________       Time:  ___________ A.M    P.M.

## (undated) NOTE — LETTER
[address] [place] [address]  Authorization for Invasive Procedures  1.  I hereby authorize Dr. Gisselle Sheikh , my physician and whomever may be designated as the doctor's assistant, to perform the following operation and/or procedure:  Ultrasound G performed for the purposes of advancing medicine, science, and/or education, provided my identity is not revealed. If the procedure has been videotaped, the physician/surgeon will obtain the original videotape.  The hospital will not be responsible for stor My signature below affirms that prior to the time of the procedure, I have explained to the patient and/or her legal representative, the risks and benefits involved in the proposed treatment and any reasonable alternative to the proposed treatment.  I have

## (undated) NOTE — ED AVS SNAPSHOT
Shahana Shoulders   MRN: [id number]    Department:  [facility] Emergency Department   Date of Visit:  6/5/2018           Disclosure     Insurance plans vary and the physician(s) referred by the ER may not be covered by your plan.  Please contact your i CARE PHYSICIAN AT ONCE OR RETURN IMMEDIATELY TO THE EMERGENCY DEPARTMENT. If you have been prescribed any medication(s), please fill your prescription right away and begin taking the medication(s) as directed.   If you believe that any of the medications

## (undated) NOTE — LETTER
8/10/2017              Alexandra Vega        [address] [facility] [address]         Dear Marina Rodriguez,      It was a pleasure to see you at our [place] , [address], [address] [facility] office. Your mammogram is normal plan for repeat in 1 year.   There is

## (undated) NOTE — LETTER
6/8/2018              Yo Roman        [address]         Dear Trish Gonzalez,    It was a pleasure to see you. Your Normal mammogram. She has stable breast cysts. Plan for repeat in 1 year.  There is no need for further testing at th

## (undated) NOTE — LETTER
No referring provider defined for this encounter. 10/12/18        Patient: Shahana Shoulders   YOB: 1970   Date of Visit: 10/12/2018       Dear  Dr. Ryan Bermudez MD,      Thank you for referring Shahana Shoulders to my practice.   Please find my assess

## (undated) NOTE — LETTER
Patient Name: Lauren Keating : 1970  Medical Record #: [id number] Printed: 2024  Page 1 of 2                                          [place] [address] [address] , [place], [place]  Autorización para operación y procedimiento quirúrgico                                                                                                      Por la presente, autorizo a ULTRASOUND GUIDED LEFT BREAST BIOPSY WITH CLIP PLACEMENT , mi médico y al asistente a realizar la operación/procedimiento quirúrgico a continuación, así eric a administrar la anestesia que determine necesaria mi médico Nombre (s)DR MALIK  de la operación/procedimiento:  en Lauren Keating     Reconozco que rocio la operación/procedimiento quirúrgico, las condiciones imprevistas pueden requerir de procedimientos adicionales o diferentes a aquellos mencionados anteriormente.  Por lo tanto, autorizo y solicito además que el cirujano antes mencionado, los asistentes o las personas designadas realicen los procedimientos que, a leonard juicio, alfredo necesarios y convenientes.    Mi cirujano/médico lomax discutido antes de mi cirugía los posibles beneficios, riesgos y efectos secundarios de frankie procedimiento, la probabilidad de alcanzar las metas y los posibles problemas que puedan ocurrir rocio la recuperación.  Ellos también cam discutido las alternativas razonables al procedimiento, incluso los riesgos, beneficios y efectos secundarios relacionados con las alternativas y los riesgos relacionados con no realizar frankie procedimiento.  Cam respondido a todas mis preguntas y confirmo que no se ha dado ninguna garantía en cuanto a los resultados que pueda obtener.    En julieta de que surja la necesidad rocio mi operación o rocio el periodo postoperatorio, también autorizo se aplique alex y/o productos sanguíneos.  Asimismo, entiendo que a pesar de las cuidadosas pruebas y análisis de alex o de los productos sanguíneos que realizan las  entidades recolectoras, todavía puedo estar sujeto a efectos adversos eric resultado de recibir dino transfusión de alex y/o productos sanguíneos.  A continuación se mencionan algunos, aunque no todos, los riesgos potenciales que pueden ocurrir: fiebre y reacciones alérgicas, reacciones hemolíticas, trasmisión de enfermedades eric hepatitis, SIDA y citomegalovirus (CMV), así eric sobrecarga de líquidos.   En julieta de que desee tener dino transfusión autóloga de mi propia alex o dino transfusión de un donante dirigido.  Lo discutiré con mi médico.  Check only if Refusing Blood or Blood Products  I understand refusal of blood or blood products as deemed necessary by my physician may have serious consequences to my condition to include possible death. I hereby assume responsibility for my refusal and release the hospital, its personnel, and my physicians from any responsibility for the consequences of my refusal.           o  Refuse       Autorizo el uso de cualquier muestra, órgano, tejido, parte del cuerpo u objeto extraño que pueda ser extraído de mi cuerpo rocio la operación/procedimiento para fines de diagnóstico, investigación o de enseñanza y leonard desecho posterior por las autoridades del hospital.  También, autorizo la revelación de los resultados de las pruebas de muestras y/o los informes escritos al médico tratante eric personal médico del hospital u otros médicos de referencia o consulta involucrados en mi atención, a discreción del patólogo o de mi médico tratante.    Doy consentimiento para que se fotografíen o graben vídeos de las operaciones o procedimientos a realizarse, incluidas las partes de mi cuerpo que alfredo adecuadas para propósitos médicos, científicos o educativos, en el entendido de que, mi identidad no sea revelada por las fotografías o por textos descriptivos que las acompañen.  Si el procedimiento es fotografiado o grabado en vídeo, el cirujano obtendrá la imagen, cinta de vídeo o CD  original.  El hospital no se hará responsable por el almacenamiento, la revelación o el mantenimiento de la imagen, cinta o CD.    Autorizo la presencia de un especialista de producto u observadores en el quirófano, según lo considere necesario mi médico o las personas que éste designe.     Reconozco que en julieta de que mi procedimiento resulte en un tiempo prolongado de radiografía/fluoroscopia, puedo desarrollar dino reacción en la piel.    Si tengo dino orden de No intentar la reanimación (DANNIE), janessa estado se suspenderá mientras esté en el quirófano, la karen de procedimientos y rocio el periodo de recuperación a menos que yo indique lo contrario explícitamente (o dino persona autorizada a beth el consentimiento en mi nombre). El cirujano o mi médico tratante determinarán cuándo termina el periodo de recuperación aplicable a los efectos de restablecer la orden de DANNIE.  Pacientes que se realizan un procedimiento de esterilización: Entiendo que si el procedimiento tiene éxito, los resultados serán permanentes y que, por lo tanto, me será imposible inseminar, concebir o tener hijos.  Asimismo, entiendo que el procedimiento tiene eric propósito la esterilidad, aunque el resultado no está garantizado.   Admito que mi médico me ha explicado la aplicación de sedación/analgesia, incluidos los riesgos y beneficios y, consiento a la administración de sedación/analgesia conforme sea necesario o conveniente a juicio de mi médico.      CERTIFICO QUE HE LEÍDO Y COMPRENDIDO EL CONSENTIMIENTO ANTERIOR PARA LA OPERACIÓN y/o PROCEDIMIENTO.             ______________________________________  _______________________________  Firma del paciente      Firma de la persona responsible  Signature of patient      Signature of responsible person                        ___________________________________                                   Nombre en imprenta de la persona responsible         Name of responsible  person          ___________________________________                 Relación con el paciente         Relationship to patient    _________________________________________  ______________ ________________  Firma del testigo          Fecha / Date Hora / Time  Signature of witness    DECLARACÓN DEL MÉDICO Mediante mi firma al calce, afirmo que antes de la hora del procedimiento, he explicadoal paciente y/o a leonard representante legal, los riesgos y beneficios involucrados en el tratamiento propuesto, así comocualquier alternativa razonable al tratamiento propuesto. También le(s) he explicado los riesgos y beneficios involucradosen el rechazo del tratarniento propuesto y alternativas al tratamiento propuesto, y he respondido a las preguntas del(la) paciente(My signature below affirms that prior to the time of the procedure, I have explained to the patient and/or his/her guardian, therisks and benefits involved in the proposed treatment and any reasonable alternative to the proposed treatment. I have alsoexplained the risks and benefits involved in refusal of the proposed treatment and have answered the patient's questions.)    ________________________________________   _________________________   _____________   (Firma del médico/Signature of Physician)                                    (Fecha/Date)                                             (Hora/Time)      Patient Name: Lauren Keating     : 1970                 Printed: 2024      Medical Record #: [id number]                                              Page 2

## (undated) NOTE — LETTER
AUTHORIZATION FOR SURGICAL OPERATION OR OTHER PROCEDURE    1. I hereby authorize Dr. Cobb, and [facility] staff assigned to my case to perform the following operation and/or procedure at the [facility] site:    ___________________________________Right foot cortisone injection  ____________________________________________________________      _______________________________________________________________________________________________    2.  My physician has explained the nature and purpose of the operation or other procedure, possible alternative methods of treatment, the risks involved, and the possibility of complication to me.  I acknowledge that no guarantee has been made as to the result that may be obtained.  3.  I recognize that, during the course of this operation, or other procedure, unforseen conditions may necessitate additional or different procedure than those listed above.  I, therefore, further authorize and request that the above named physician, his/her physician assistants or designees perform such procedures as are, in his/her professional opinion, necessary and desirable.  4.  Any tissue or organs removed in the operation or other procedure may be disposed of by and at the discretion of the [facility] and [facility].  5.  I understand that in the event of a medical emergency, I will be transported by local paramedics to [facility] or other hospital emergency department.  6.  I certify that I have read and fully understand the above consent to operation and/or other procedure.    7.  I acknowledge that my physician has explained sedation/analgesia administration to me including the risks and benefits.  I consent to the administration of sedation/analgesia as may be necessary or desirable in the judgement of my physician.    Witness signature: ___________________________________________________ Date:   ______/______/_____                    Time:  ________ A.M.  P.M.       Patient Name:  ______________________________________________________  (please print)      Patient signature:  ___________________________________________________             Relationship to Patient:           []  Parent    Responsible person                          []  Spouse  In case of minor or                    [] Other  _____________   Incompetent name:  __________________________________________________                               (please print)      _____________      Responsible person  In case of minor or  Incompetent signature:  _______________________________________________    Statement of Physician  My signature below affirms that prior to the time of the procedure, I have explained to the patient and/or his/her guardian, the risks and benefits involved in the proposed treatment and any reasonable alternative to the proposed treatment.  I have also explained the risks and benefits involved in the refusal of the proposed treatment and have answered the patient's questions.                        Date:  ______/______/_______  Provider                      Signature:  __________________________________________________________       Time:  ___________ A.M    P.M.

## (undated) NOTE — LETTER
Micah Mc, [address]  [address]   [address]  [address] [facility], [address] [place] [address] Ave       10/01/19        Patient: Calros Peoples   YOB: 1970   Date of Visit: 10/1/2019       Dear  Dr. Bryan Gonzales MD,      Thank you for referring Carlos Peoples to my practice